# Patient Record
Sex: FEMALE | Race: WHITE | NOT HISPANIC OR LATINO
[De-identification: names, ages, dates, MRNs, and addresses within clinical notes are randomized per-mention and may not be internally consistent; named-entity substitution may affect disease eponyms.]

---

## 2017-11-10 ENCOUNTER — APPOINTMENT (OUTPATIENT)
Dept: DERMATOLOGY | Facility: CLINIC | Age: 82
End: 2017-11-10
Payer: MEDICARE

## 2017-11-10 PROCEDURE — 99214 OFFICE O/P EST MOD 30 MIN: CPT | Mod: 25

## 2017-11-10 PROCEDURE — 17000 DESTRUCT PREMALG LESION: CPT

## 2017-12-06 ENCOUNTER — APPOINTMENT (OUTPATIENT)
Dept: DERMATOLOGY | Facility: CLINIC | Age: 82
End: 2017-12-06
Payer: MEDICARE

## 2017-12-06 PROCEDURE — 99213 OFFICE O/P EST LOW 20 MIN: CPT

## 2018-01-24 ENCOUNTER — APPOINTMENT (OUTPATIENT)
Dept: DERMATOLOGY | Facility: CLINIC | Age: 83
End: 2018-01-24
Payer: MEDICARE

## 2018-01-24 PROCEDURE — 99213 OFFICE O/P EST LOW 20 MIN: CPT | Mod: 25

## 2018-01-24 PROCEDURE — 17000 DESTRUCT PREMALG LESION: CPT

## 2018-04-05 ENCOUNTER — APPOINTMENT (OUTPATIENT)
Dept: DERMATOLOGY | Facility: CLINIC | Age: 83
End: 2018-04-05

## 2019-02-12 ENCOUNTER — APPOINTMENT (OUTPATIENT)
Dept: PULMONOLOGY | Facility: CLINIC | Age: 84
End: 2019-02-12
Payer: MEDICARE

## 2019-02-12 VITALS
HEART RATE: 67 BPM | BODY MASS INDEX: 23.78 KG/M2 | DIASTOLIC BLOOD PRESSURE: 70 MMHG | SYSTOLIC BLOOD PRESSURE: 100 MMHG | OXYGEN SATURATION: 98 % | HEIGHT: 66 IN | WEIGHT: 148 LBS

## 2019-02-12 DIAGNOSIS — Z87.39 PERSONAL HISTORY OF OTHER DISEASES OF THE MUSCULOSKELETAL SYSTEM AND CONNECTIVE TISSUE: ICD-10-CM

## 2019-02-12 DIAGNOSIS — Z00.00 ENCOUNTER FOR GENERAL ADULT MEDICAL EXAMINATION W/OUT ABNORMAL FINDINGS: ICD-10-CM

## 2019-02-12 DIAGNOSIS — Z86.79 PERSONAL HISTORY OF OTHER DISEASES OF THE CIRCULATORY SYSTEM: ICD-10-CM

## 2019-02-12 PROCEDURE — 99204 OFFICE O/P NEW MOD 45 MIN: CPT

## 2019-02-12 RX ORDER — ATENOLOL 50 MG/1
50 TABLET ORAL
Refills: 0 | Status: ACTIVE | COMMUNITY
Start: 2019-02-12

## 2019-02-12 RX ORDER — SPIRONOLACTONE 25 MG/1
25 TABLET ORAL
Refills: 0 | Status: ACTIVE | COMMUNITY

## 2019-02-12 RX ORDER — CLONAZEPAM 0.5 MG/1
0.5 TABLET ORAL
Refills: 0 | Status: DISCONTINUED | COMMUNITY
End: 2019-02-12

## 2019-02-12 RX ORDER — FUROSEMIDE 40 MG/1
40 TABLET ORAL
Refills: 0 | Status: ACTIVE | COMMUNITY

## 2019-02-12 RX ORDER — FUROSEMIDE 20 MG/1
20 TABLET ORAL
Refills: 0 | Status: DISCONTINUED | COMMUNITY
End: 2019-02-12

## 2019-02-12 RX ORDER — ATENOLOL 25 MG/1
25 TABLET ORAL
Refills: 0 | Status: DISCONTINUED | COMMUNITY
End: 2019-02-12

## 2019-02-12 NOTE — PROCEDURE
[FreeTextEntry1] : Cardiology records reviewed\par CT scan 12/18 reviewed\par small R pl eff\par minimal atelectasis scarring bases\par no infiltrates, adenopathy or nodules

## 2019-02-12 NOTE — REASON FOR VISIT
[Initial Evaluation] : an initial evaluation [Abnormal CT Scan] : abnormal CT Scan [FreeTextEntry2] : pleural effusion, ILD, gill

## 2019-02-12 NOTE — PHYSICAL EXAM
[General Appearance - Well Developed] : well developed [Normal Appearance] : normal appearance [General Appearance - Well Nourished] : well nourished [Normal Oropharynx] : normal oropharynx [II] : II [Neck Appearance] : the appearance of the neck was normal [Heart Rate And Rhythm] : heart rate and rhythm were normal [Heart Sounds] : normal S1 and S2 [Murmurs] : no murmurs present [Edema] : no peripheral edema present [Respiration, Rhythm And Depth] : normal respiratory rhythm and effort [Exaggerated Use Of Accessory Muscles For Inspiration] : no accessory muscle use [Auscultation Breath Sounds / Voice Sounds] : lungs were clear to auscultation bilaterally [FreeTextEntry1] : walks with cane [Nail Clubbing] : no clubbing of the fingernails [Cyanosis, Localized] : no localized cyanosis [] : no rash [No Focal Deficits] : no focal deficits [Oriented To Time, Place, And Person] : oriented to person, place, and time [Impaired Insight] : insight and judgment were intact [Affect] : the affect was normal [Memory Recent] : recent memory was not impaired

## 2019-02-12 NOTE — HISTORY OF PRESENT ILLNESS
[FreeTextEntry1] : 83-year-old female with history of hypertension\par Psoriatic and rheumatoid arthritis previously on methotrexate\par Recent echocardiogram and CAT scan with evidence of pulmonary hypertension and small pleural effusion\par She has never smoked\par No prior history of COPD or asthma\par Has chronic exertional dyspnea with a significant component being her inability to walk with any stability\par No productive cough or sputum\par No fever chills or chest\par \par \par \par \par \par \par \par \par \par \par

## 2019-02-12 NOTE — DISCUSSION/SUMMARY
[FreeTextEntry1] : No significant ILD on CT scan, not classic LASHAE body habitus\par small effusion and mild pulmonary hypertension on last echocardiogram 2017\par repeat echocardiogram pending \par pt could not stay for PFTS secondary to inclement weather\par will repeat CXR for effusion and obtain duplex lower extremity, v/q scan\par would need R heart cath if any progression of pulmonary hypertension or worsening symptoms\par to distinguish from WHO diagnostic class II which would be most common\par will re evaluate in 6 weeks or sooner if needed with full PFTS

## 2019-02-12 NOTE — CONSULT LETTER
[Dear  ___] : Dear  [unfilled], [Consult Letter:] : I had the pleasure of evaluating your patient, [unfilled]. [Please see my note below.] : Please see my note below. [Sincerely,] : Sincerely, [FreeTextEntry3] : [facility] DO [facility]\par Pulmonary Critical Care\par Director Pulmonary Division\par Medical Director Respiratory Therapy\par [facility]\par \par

## 2019-03-13 ENCOUNTER — MESSAGE (OUTPATIENT)
Age: 84
End: 2019-03-13

## 2019-04-01 ENCOUNTER — APPOINTMENT (OUTPATIENT)
Dept: PULMONOLOGY | Facility: CLINIC | Age: 84
End: 2019-04-01
Payer: MEDICARE

## 2019-04-01 VITALS
BODY MASS INDEX: 23.73 KG/M2 | DIASTOLIC BLOOD PRESSURE: 82 MMHG | SYSTOLIC BLOOD PRESSURE: 132 MMHG | WEIGHT: 147 LBS | HEART RATE: 70 BPM | OXYGEN SATURATION: 98 %

## 2019-04-01 DIAGNOSIS — Z86.69 PERSONAL HISTORY OF OTHER DISEASES OF THE NERVOUS SYSTEM AND SENSE ORGANS: ICD-10-CM

## 2019-04-01 PROCEDURE — 94010 BREATHING CAPACITY TEST: CPT

## 2019-04-01 PROCEDURE — 99215 OFFICE O/P EST HI 40 MIN: CPT | Mod: 25

## 2019-04-01 RX ORDER — INSULIN DETEMIR 100 [IU]/ML
100 INJECTION, SOLUTION SUBCUTANEOUS
Refills: 0 | Status: ACTIVE | COMMUNITY

## 2019-04-01 NOTE — PHYSICAL EXAM
[General Appearance - Well Developed] : well developed [Normal Appearance] : normal appearance [General Appearance - Well Nourished] : well nourished [Normal Oropharynx] : normal oropharynx [II] : II [Neck Appearance] : the appearance of the neck was normal [Heart Rate And Rhythm] : heart rate and rhythm were normal [Heart Sounds] : normal S1 and S2 [Murmurs] : no murmurs present [Edema] : no peripheral edema present [Respiration, Rhythm And Depth] : normal respiratory rhythm and effort [Exaggerated Use Of Accessory Muscles For Inspiration] : no accessory muscle use [Nail Clubbing] : no clubbing of the fingernails [Cyanosis, Localized] : no localized cyanosis [No Focal Deficits] : no focal deficits [Oriented To Time, Place, And Person] : oriented to person, place, and time [Impaired Insight] : insight and judgment were intact [Affect] : the affect was normal [Memory Recent] : recent memory was not impaired [] : no rash [FreeTextEntry1] : walks with cane

## 2019-04-01 NOTE — HISTORY OF PRESENT ILLNESS
[FreeTextEntry1] : 83-year-old female with history of hypertension\par Psoriatic and rheumatoid arthritis previously on methotrexate\par Recent echocardiogram and CAT scan with evidence of pulmonary hypertension and small pleural effusion\par She has never smoked\par No prior history of COPD or asthma\par Has chronic exertional dyspnea with a significant component being her inability to walk with any stability\par No productive cough or sputum\par No fever chills or chest pain\par no sig change\par \par \par \par \par \par \par \par \par \par \par

## 2019-04-01 NOTE — DISCUSSION/SUMMARY
[FreeTextEntry1] : No significant ILD on CT scan, not classic LASHAE body habitus, CXR small R eff, duplex negative\par mild pulmonary hypertension on last echocardiogram 2017\par repeat echocardiogram 2/19 with biatrial enlargement, normal LV, no sig pulmonary Htn\par spirometry is normal\par will repeat CXR for effusion and obtain  v/q scan, on diuretics\par would need R heart cath if any progression of pulmonary hypertension or worsening symptoms\par discussed home physical therapy for gait abnormalities\par 3-4 months or sooner if needed

## 2019-04-01 NOTE — CONSULT LETTER
[Dear  ___] : Dear  [unfilled], [Consult Letter:] : I had the pleasure of evaluating your patient, [unfilled]. [Please see my note below.] : Please see my note below. [Sincerely,] : Sincerely, [FreeTextEntry3] : [facility] DO [facility]\par Pulmonary Critical Care\par Director Pulmonary Division\par Medical Director Respiratory Therapy\par [facility]\par \par

## 2019-04-01 NOTE — PROCEDURE
[FreeTextEntry1] : Cardiology records reviewed\par CT scan 12/18 reviewed\par small R pl eff\par minimal atelectasis scarring bases\par no infiltrates, adenopathy or nodules\par \par CXR 3/19: neg duplex, small r eff\par spirometry is normal

## 2019-06-06 ENCOUNTER — APPOINTMENT (OUTPATIENT)
Dept: DERMATOLOGY | Facility: CLINIC | Age: 84
End: 2019-06-06
Payer: MEDICARE

## 2019-06-06 PROCEDURE — 99213 OFFICE O/P EST LOW 20 MIN: CPT

## 2019-06-07 ENCOUNTER — APPOINTMENT (OUTPATIENT)
Dept: DERMATOLOGY | Facility: CLINIC | Age: 84
End: 2019-06-07

## 2019-07-01 ENCOUNTER — APPOINTMENT (OUTPATIENT)
Dept: PULMONOLOGY | Facility: CLINIC | Age: 84
End: 2019-07-01

## 2019-08-06 ENCOUNTER — APPOINTMENT (OUTPATIENT)
Dept: DERMATOLOGY | Facility: CLINIC | Age: 84
End: 2019-08-06

## 2019-10-02 ENCOUNTER — OTHER (OUTPATIENT)
Age: 84
End: 2019-10-02

## 2020-01-10 ENCOUNTER — TRANSCRIPTION ENCOUNTER (OUTPATIENT)
Age: 85
End: 2020-01-10

## 2020-03-27 ENCOUNTER — APPOINTMENT (OUTPATIENT)
Dept: DERMATOLOGY | Facility: CLINIC | Age: 85
End: 2020-03-27

## 2020-03-30 ENCOUNTER — APPOINTMENT (OUTPATIENT)
Dept: DERMATOLOGY | Facility: CLINIC | Age: 85
End: 2020-03-30

## 2020-05-06 ENCOUNTER — APPOINTMENT (OUTPATIENT)
Dept: THORACIC SURGERY | Facility: CLINIC | Age: 85
End: 2020-05-06
Payer: MEDICARE

## 2020-05-06 DIAGNOSIS — Z82.49 FAMILY HISTORY OF ISCHEMIC HEART DISEASE AND OTHER DISEASES OF THE CIRCULATORY SYSTEM: ICD-10-CM

## 2020-05-06 DIAGNOSIS — Z80.1 FAMILY HISTORY OF MALIGNANT NEOPLASM OF TRACHEA, BRONCHUS AND LUNG: ICD-10-CM

## 2020-05-06 PROCEDURE — 99204 OFFICE O/P NEW MOD 45 MIN: CPT | Mod: 95

## 2020-05-06 RX ORDER — ALBUTEROL SULFATE 2.5 MG/3ML
(2.5 MG/3ML) SOLUTION RESPIRATORY (INHALATION)
Refills: 0 | Status: ACTIVE | COMMUNITY

## 2020-05-06 RX ORDER — GLIPIZIDE 10 MG/1
10 TABLET ORAL
Refills: 0 | Status: COMPLETED | COMMUNITY
End: 2020-05-06

## 2020-05-06 RX ORDER — CLONAZEPAM 0.25 MG
0.25 TABLET,DISINTEGRATING ORAL
Refills: 0 | Status: COMPLETED | COMMUNITY
End: 2020-05-06

## 2020-05-06 RX ORDER — CLONIDINE HYDROCHLORIDE 0.2 MG/1
0.2 TABLET ORAL
Refills: 0 | Status: ACTIVE | COMMUNITY

## 2020-05-06 RX ORDER — METFORMIN HYDROCHLORIDE 500 MG/1
500 TABLET, COATED ORAL
Refills: 0 | Status: COMPLETED | COMMUNITY
End: 2020-05-06

## 2020-05-06 RX ORDER — AMLODIPINE BESYLATE 10 MG/1
10 TABLET ORAL
Refills: 0 | Status: ACTIVE | COMMUNITY

## 2020-05-06 NOTE — HISTORY OF PRESENT ILLNESS
[Home] : at home, [unfilled] , at the time of the visit. [Medical Office: ([facility])___] : at the medical office located in  [Family Member] : family member [Patient] : the patient [Self] : self [FreeTextEntry4] : Joseph Alford, NP [FreeTextEntry1] : \par I spoke with Lucille by phone today for a telehealth visit. She has a history of rheumatoid arthritis and cirrhosis with a recent finding of a right pleural effusion and ascites. She did have progressive shortness of breath but has now improved as her diuretics have been adjusted. A recent CT scan demonstrated a moderate to large right pleural effusion with ascites and a cirrhotic-appearing liver. She does function well on a daily basis at this point. She denies fever, chills, night sweats, weight loss or weight gain.

## 2020-05-06 NOTE — ASSESSMENT
[FreeTextEntry1] : Lucille is an 84-year-old female with a right pleural effusion and ascites which are likely related to cirrhosis. She does state she is feeling better at this point with increased diuretics and would like to hold on thoracentesis for now. I have asked her to contact my office if her breathing worsens so an x-ray and drainage can be arranged.\par \par Thank you for allowing me to participate in the care of your patient.\par \par Agus Best MD\[facility] Department of Cardiovascular and Thoracic Surgery\par \paz Menon and Tiffanie Benson\[facility] [employer] [facility]\par

## 2020-08-10 ENCOUNTER — APPOINTMENT (OUTPATIENT)
Dept: PULMONOLOGY | Facility: CLINIC | Age: 85
End: 2020-08-10
Payer: MEDICARE

## 2020-08-10 VITALS
HEIGHT: 65 IN | OXYGEN SATURATION: 95 % | HEART RATE: 65 BPM | WEIGHT: 140 LBS | DIASTOLIC BLOOD PRESSURE: 78 MMHG | BODY MASS INDEX: 23.32 KG/M2 | SYSTOLIC BLOOD PRESSURE: 126 MMHG

## 2020-08-10 DIAGNOSIS — J90 PLEURAL EFFUSION, NOT ELSEWHERE CLASSIFIED: ICD-10-CM

## 2020-08-10 DIAGNOSIS — I27.20 PULMONARY HYPERTENSION, UNSPECIFIED: ICD-10-CM

## 2020-08-10 DIAGNOSIS — R06.00 DYSPNEA, UNSPECIFIED: ICD-10-CM

## 2020-08-10 PROCEDURE — 99215 OFFICE O/P EST HI 40 MIN: CPT

## 2020-08-10 RX ORDER — ALBUTEROL SULFATE 0.63 MG/3ML
0.63 SOLUTION RESPIRATORY (INHALATION)
Qty: 75 | Refills: 0 | Status: DISCONTINUED | COMMUNITY
Start: 2020-05-20

## 2020-08-10 RX ORDER — BLOOD SUGAR DIAGNOSTIC
STRIP MISCELLANEOUS
Qty: 300 | Refills: 0 | Status: DISCONTINUED | COMMUNITY
Start: 2020-03-18

## 2020-08-10 RX ORDER — BENZONATATE 100 MG/1
100 CAPSULE ORAL
Qty: 90 | Refills: 0 | Status: ACTIVE | COMMUNITY
Start: 2020-07-07

## 2020-08-10 NOTE — REASON FOR VISIT
[Abnormal CT Scan] : abnormal CT Scan [Initial Evaluation] : an initial evaluation [FreeTextEntry2] : pleural effusion, ILD, gill

## 2020-08-10 NOTE — PROCEDURE
[FreeTextEntry1] : Cardiology records reviewed\par CT scan 12/18 reviewed\par small R pl eff\par minimal atelectasis scarring bases\par no infiltrates, adenopathy or nodules\par \par CXR 8/20: large R eff \par spirometry is normal

## 2020-08-10 NOTE — HISTORY OF PRESENT ILLNESS
[FreeTextEntry1] : 83-year-old female with history of hypertension\par Psoriatic and rheumatoid arthritis previously on methotrexate\par  never smoked\par No prior history of COPD or asthma\par Has chronic exertional dyspnea , feels better\par No productive cough or sputum\par No fever chills or chest pain\par no sig change\par \par \par \par \par \par \par \par \par \par \par

## 2020-08-10 NOTE — CONSULT LETTER
[Consult Letter:] : I had the pleasure of evaluating your patient, [unfilled]. [Please see my note below.] : Please see my note below. [Sincerely,] : Sincerely, [Dear  ___] : Dear  [unfilled], [FreeTextEntry3] : Peter Cotto DO [facility]\par Pulmonary Critical Care\par Director Pulmonary Division\par Medical Director Respiratory Therapy\par [facility]\par \par

## 2020-08-10 NOTE — PHYSICAL EXAM
[Normal Appearance] : normal appearance [General Appearance - Well Developed] : well developed [Normal Oropharynx] : normal oropharynx [General Appearance - Well Nourished] : well nourished [II] : II [Heart Rate And Rhythm] : heart rate and rhythm were normal [Neck Appearance] : the appearance of the neck was normal [Murmurs] : no murmurs present [Edema] : no peripheral edema present [Heart Sounds] : normal S1 and S2 [Exaggerated Use Of Accessory Muscles For Inspiration] : no accessory muscle use [Respiration, Rhythm And Depth] : normal respiratory rhythm and effort [Nail Clubbing] : no clubbing of the fingernails [Cyanosis, Localized] : no localized cyanosis [No Focal Deficits] : no focal deficits [Oriented To Time, Place, And Person] : oriented to person, place, and time [Impaired Insight] : insight and judgment were intact [Affect] : the affect was normal [Memory Recent] : recent memory was not impaired [] : no rash [FreeTextEntry1] : walks with cane

## 2020-08-10 NOTE — DISCUSSION/SUMMARY
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

## 2020-08-24 ENCOUNTER — APPOINTMENT (OUTPATIENT)
Dept: GASTROENTEROLOGY | Facility: CLINIC | Age: 85
End: 2020-08-24
Payer: MEDICARE

## 2020-08-24 VITALS
BODY MASS INDEX: 21.97 KG/M2 | SYSTOLIC BLOOD PRESSURE: 160 MMHG | WEIGHT: 140 LBS | OXYGEN SATURATION: 96 % | HEART RATE: 65 BPM | HEIGHT: 67 IN | DIASTOLIC BLOOD PRESSURE: 90 MMHG

## 2020-08-24 DIAGNOSIS — R19.00 INTRA-ABDOMINAL AND PELVIC SWELLING, MASS AND LUMP, UNSPECIFIED SITE: ICD-10-CM

## 2020-08-24 DIAGNOSIS — K74.69 OTHER CIRRHOSIS OF LIVER: ICD-10-CM

## 2020-08-24 PROCEDURE — 99203 OFFICE O/P NEW LOW 30 MIN: CPT

## 2020-08-24 RX ORDER — PREDNISONE 2.5 MG/1
2.5 TABLET ORAL
Qty: 90 | Refills: 0 | Status: ACTIVE | COMMUNITY
Start: 2020-04-17

## 2020-08-24 RX ORDER — DOXYCYCLINE HYCLATE 100 MG/1
100 TABLET ORAL
Qty: 14 | Refills: 0 | Status: ACTIVE | COMMUNITY
Start: 2020-04-25

## 2020-08-24 RX ORDER — AZITHROMYCIN 250 MG/1
250 TABLET, FILM COATED ORAL
Qty: 6 | Refills: 0 | Status: ACTIVE | COMMUNITY
Start: 2020-04-22

## 2020-08-24 RX ORDER — PEN NEEDLE, DIABETIC 29 G X1/2"
32G X 4 MM NEEDLE, DISPOSABLE MISCELLANEOUS
Qty: 400 | Refills: 0 | Status: ACTIVE | COMMUNITY
Start: 2020-04-15

## 2020-08-24 RX ORDER — INSULIN DETEMIR 100 [IU]/ML
100 INJECTION, SOLUTION SUBCUTANEOUS
Qty: 15 | Refills: 0 | Status: ACTIVE | COMMUNITY
Start: 2020-04-15

## 2020-08-24 NOTE — CONSULT LETTER
[Dear  ___] : Dear  [unfilled], [Please see my note below.] : Please see my note below. [Consult Letter:] : I had the pleasure of evaluating your patient, [unfilled]. [Consult Closing:] : Thank you very much for allowing me to participate in the care of this patient.  If you have any questions, please do not hesitate to contact me. [DrSantos  ___] : Dr. KERR [FreeTextEntry3] : Very truly yours,\par \par KIRILL Roa MD\par [facility]\par Gastroenterology at [place]\par [address], [address]\[place] [address]\par Tel [phone]\par Fax [phone]

## 2020-08-24 NOTE — PHYSICAL EXAM
[General Appearance - Alert] : alert [General Appearance - In No Acute Distress] : in no acute distress [PERRL With Normal Accommodation] : pupils were equal in size, round, and reactive to light [Sclera] : the sclera and conjunctiva were normal [Extraocular Movements] : extraocular movements were intact [Outer Ear] : the ears and nose were normal in appearance [Neck Appearance] : the appearance of the neck was normal [Hearing Threshold Finger Rub Not Villalba] : hearing was normal [Neck Cervical Mass (___cm)] : no neck mass was observed [Jugular Venous Distention Increased] : there was no jugular-venous distention [Thyroid Diffuse Enlargement] : the thyroid was not enlarged [] : no respiratory distress [Thyroid Nodule] : there were no palpable thyroid nodules [Heart Rate And Rhythm] : heart rate was normal and rhythm regular [Auscultation Breath Sounds / Voice Sounds] : lungs were clear to auscultation bilaterally [Murmurs] : no murmurs [Heart Sounds] : normal S1 and S2 [Heart Sounds Gallop] : no gallops [Heart Sounds Pericardial Friction Rub] : no pericardial rub [___+] : [unfilled]+ pretibial pitting edema on the left [Normal] : normal [Soft, Nontender] : the abdomen was soft and nontender [Tympanitic] : tympanitic to percussion [Epigastric] : in the epigastric area [No Mass] : no masses were palpated [Liver Enlarged] : enlarged [___cm] : with a span of [unfilled] cm [Cervical Lymph Nodes Enlarged Posterior Bilaterally] : posterior cervical [Cervical Lymph Nodes Enlarged Anterior Bilaterally] : anterior cervical [Nail Clubbing] : no clubbing  or cyanosis of the fingernails [Skin Color & Pigmentation] : normal skin color and pigmentation [Skin Turgor] : normal skin turgor [No Focal Deficits] : no focal deficits [Oriented To Time, Place, And Person] : oriented to person, place, and time [Impaired Insight] : insight and judgment were intact [Affect] : the affect was normal [Dilated Collat. Veins] : no collateral vein dilation [Liver Tender To Palpation] : not tender [Spleen Tender] : not tender [Spleen Enlarged] : not enlarged [FreeTextEntry1] : No cirrhosis stimaga

## 2020-08-24 NOTE — ASSESSMENT
[FreeTextEntry1] : Will send liver serologies and ultrasound with [facility].  Will also touch base with Dr. VALERI Krishnamurthy.  Patient is not OLT candidate due to age.  Liver nodular and enlarged on exam.  Suspect either cardiac cirrhosis or GAMING cirrhosis or a combination thereof.

## 2020-08-24 NOTE — HISTORY OF PRESENT ILLNESS
[de-identified] : This is a lesly 85-year-old woman with a past medical history of CHF, pulmonary hypertension, DM, ILD and ?cirrhosis who was referred for the latter.  Patient does nit drink alcohol and has no history of illicit drug use.  Per chart review, it does not appears she has had any abdominal imaging.  She reports some periodic abdominal swelling.  It was recommended to her that she undergo a paracentesis bur feared it would lead to a viscous cycle of needing repeated taps.  She is currently on furosemide and spironolactone.

## 2020-08-24 NOTE — REVIEW OF SYSTEMS
[As Noted in HPI] : as noted in HPI [Negative] : Heme/Lymph [Abdominal Pain] : no abdominal pain [Vomiting] : no vomiting [Constipation] : no constipation [Diarrhea] : no diarrhea

## 2020-09-02 ENCOUNTER — RX CHANGE (OUTPATIENT)
Age: 85
End: 2020-09-02

## 2020-09-12 ENCOUNTER — OUTPATIENT (OUTPATIENT)
Dept: OUTPATIENT SERVICES | Facility: HOSPITAL | Age: 85
LOS: 1 days | End: 2020-09-12
Payer: MEDICARE

## 2020-09-12 ENCOUNTER — APPOINTMENT (OUTPATIENT)
Dept: ULTRASOUND IMAGING | Facility: CLINIC | Age: 85
End: 2020-09-12
Payer: MEDICARE

## 2020-09-12 DIAGNOSIS — K74.69 OTHER CIRRHOSIS OF LIVER: ICD-10-CM

## 2020-09-12 DIAGNOSIS — Z00.00 ENCOUNTER FOR GENERAL ADULT MEDICAL EXAMINATION WITHOUT ABNORMAL FINDINGS: ICD-10-CM

## 2020-09-12 PROCEDURE — 93975 VASCULAR STUDY: CPT

## 2020-09-12 PROCEDURE — 93975 VASCULAR STUDY: CPT | Mod: 26

## 2020-09-13 ENCOUNTER — TRANSCRIPTION ENCOUNTER (OUTPATIENT)
Age: 85
End: 2020-09-13

## 2020-10-09 ENCOUNTER — RESULT REVIEW (OUTPATIENT)
Age: 85
End: 2020-10-09

## 2020-10-09 ENCOUNTER — INPATIENT (INPATIENT)
Facility: HOSPITAL | Age: 85
LOS: 10 days | Discharge: HOME | DRG: 432 | End: 2020-10-20
Attending: HOSPITALIST | Admitting: HOSPITALIST
Payer: MEDICARE

## 2020-10-09 VITALS
OXYGEN SATURATION: 95 % | SYSTOLIC BLOOD PRESSURE: 188 MMHG | DIASTOLIC BLOOD PRESSURE: 108 MMHG | HEART RATE: 74 BPM | TEMPERATURE: 98 F | RESPIRATION RATE: 22 BRPM

## 2020-10-09 DIAGNOSIS — J90 PLEURAL EFFUSION, NOT ELSEWHERE CLASSIFIED: ICD-10-CM

## 2020-10-09 DIAGNOSIS — E11.9 TYPE 2 DIABETES MELLITUS WITHOUT COMPLICATIONS: ICD-10-CM

## 2020-10-09 DIAGNOSIS — I50.9 HEART FAILURE, UNSPECIFIED: ICD-10-CM

## 2020-10-09 DIAGNOSIS — Z98.890 OTHER SPECIFIED POSTPROCEDURAL STATES: Chronic | ICD-10-CM

## 2020-10-09 DIAGNOSIS — I10 ESSENTIAL (PRIMARY) HYPERTENSION: ICD-10-CM

## 2020-10-09 DIAGNOSIS — I48.91 UNSPECIFIED ATRIAL FIBRILLATION: ICD-10-CM

## 2020-10-09 LAB
ALBUMIN FLD-MCNC: 0.6 G/DL — SIGNIFICANT CHANGE UP
ALBUMIN SERPL ELPH-MCNC: 2.8 G/DL — LOW (ref 3.3–5.2)
ALP SERPL-CCNC: 163 U/L — HIGH (ref 40–120)
ALT FLD-CCNC: 19 U/L — SIGNIFICANT CHANGE UP
ANION GAP SERPL CALC-SCNC: 11 MMOL/L — SIGNIFICANT CHANGE UP (ref 5–17)
ANION GAP SERPL CALC-SCNC: 13 MMOL/L — SIGNIFICANT CHANGE UP (ref 5–17)
APPEARANCE UR: CLEAR — SIGNIFICANT CHANGE UP
APTT BLD: 29.7 SEC — SIGNIFICANT CHANGE UP (ref 27.5–35.5)
AST SERPL-CCNC: 35 U/L — HIGH
B PERT IGG+IGM PNL SER: CLEAR — SIGNIFICANT CHANGE UP
BACTERIA # UR AUTO: ABNORMAL
BASE EXCESS BLDV CALC-SCNC: -0.2 MMOL/L — SIGNIFICANT CHANGE UP (ref -2–2)
BASOPHILS # BLD AUTO: 0.06 K/UL — SIGNIFICANT CHANGE UP (ref 0–0.2)
BASOPHILS NFR BLD AUTO: 0.7 % — SIGNIFICANT CHANGE UP (ref 0–2)
BILIRUB SERPL-MCNC: 0.6 MG/DL — SIGNIFICANT CHANGE UP (ref 0.4–2)
BILIRUB UR-MCNC: NEGATIVE — SIGNIFICANT CHANGE UP
BUN SERPL-MCNC: 57 MG/DL — HIGH (ref 8–20)
BUN SERPL-MCNC: 59 MG/DL — HIGH (ref 8–20)
CA-I SERPL-SCNC: 1.17 MMOL/L — SIGNIFICANT CHANGE UP (ref 1.15–1.33)
CALCIUM SERPL-MCNC: 8.7 MG/DL — SIGNIFICANT CHANGE UP (ref 8.6–10.2)
CALCIUM SERPL-MCNC: 8.8 MG/DL — SIGNIFICANT CHANGE UP (ref 8.6–10.2)
CHLORIDE BLDV-SCNC: 104 MMOL/L — SIGNIFICANT CHANGE UP (ref 98–107)
CHLORIDE SERPL-SCNC: 100 MMOL/L — SIGNIFICANT CHANGE UP (ref 98–107)
CHLORIDE SERPL-SCNC: 101 MMOL/L — SIGNIFICANT CHANGE UP (ref 98–107)
CO2 SERPL-SCNC: 23 MMOL/L — SIGNIFICANT CHANGE UP (ref 22–29)
CO2 SERPL-SCNC: 24 MMOL/L — SIGNIFICANT CHANGE UP (ref 22–29)
COLOR FLD: YELLOW
COLOR SPEC: YELLOW — SIGNIFICANT CHANGE UP
CREAT SERPL-MCNC: 1.81 MG/DL — HIGH (ref 0.5–1.3)
CREAT SERPL-MCNC: 1.83 MG/DL — HIGH (ref 0.5–1.3)
DIFF PNL FLD: ABNORMAL
EOSINOPHIL # BLD AUTO: 0.18 K/UL — SIGNIFICANT CHANGE UP (ref 0–0.5)
EOSINOPHIL NFR BLD AUTO: 2.2 % — SIGNIFICANT CHANGE UP (ref 0–6)
EPI CELLS # UR: SIGNIFICANT CHANGE UP
FLUID INTAKE SUBSTANCE CLASS: SIGNIFICANT CHANGE UP
FLUID SEGMENTED GRANULOCYTES: 2 % — SIGNIFICANT CHANGE UP
GAS PNL BLDV: 139 MMOL/L — SIGNIFICANT CHANGE UP (ref 135–145)
GAS PNL BLDV: SIGNIFICANT CHANGE UP
GAS PNL BLDV: SIGNIFICANT CHANGE UP
GLUCOSE BLDC GLUCOMTR-MCNC: 203 MG/DL — HIGH (ref 70–99)
GLUCOSE BLDC GLUCOMTR-MCNC: 221 MG/DL — HIGH (ref 70–99)
GLUCOSE BLDC GLUCOMTR-MCNC: 282 MG/DL — HIGH (ref 70–99)
GLUCOSE BLDV-MCNC: 311 MG/DL — HIGH (ref 70–99)
GLUCOSE FLD-MCNC: 347 MG/DL — SIGNIFICANT CHANGE UP
GLUCOSE SERPL-MCNC: 293 MG/DL — HIGH (ref 70–99)
GLUCOSE SERPL-MCNC: 332 MG/DL — HIGH (ref 70–99)
GLUCOSE UR QL: 100 MG/DL
GRAM STN FLD: SIGNIFICANT CHANGE UP
HCO3 BLDV-SCNC: 24 MMOL/L — SIGNIFICANT CHANGE UP (ref 20–26)
HCT VFR BLD CALC: 42 % — SIGNIFICANT CHANGE UP (ref 34.5–45)
HCT VFR BLDA CALC: 42 — SIGNIFICANT CHANGE UP (ref 39–50)
HGB BLD CALC-MCNC: 13.8 G/DL — SIGNIFICANT CHANGE UP (ref 11.5–15.5)
HGB BLD-MCNC: 13.6 G/DL — SIGNIFICANT CHANGE UP (ref 11.5–15.5)
IMM GRANULOCYTES NFR BLD AUTO: 0.2 % — SIGNIFICANT CHANGE UP (ref 0–1.5)
INR BLD: 1.03 RATIO — SIGNIFICANT CHANGE UP (ref 0.88–1.16)
KETONES UR-MCNC: NEGATIVE — SIGNIFICANT CHANGE UP
LACTATE BLDV-MCNC: 2.2 MMOL/L — HIGH (ref 0.5–2)
LDH SERPL L TO P-CCNC: 235 U/L — HIGH (ref 98–192)
LDH SERPL L TO P-CCNC: 54 U/L — SIGNIFICANT CHANGE UP
LEUKOCYTE ESTERASE UR-ACNC: NEGATIVE — SIGNIFICANT CHANGE UP
LIDOCAIN IGE QN: 44 U/L — SIGNIFICANT CHANGE UP (ref 22–51)
LYMPHOCYTES # BLD AUTO: 1.05 K/UL — SIGNIFICANT CHANGE UP (ref 1–3.3)
LYMPHOCYTES # BLD AUTO: 13.1 % — SIGNIFICANT CHANGE UP (ref 13–44)
LYMPHOCYTES # FLD: 31 % — SIGNIFICANT CHANGE UP
MAGNESIUM SERPL-MCNC: 2 MG/DL — SIGNIFICANT CHANGE UP (ref 1.6–2.6)
MCHC RBC-ENTMCNC: 31.2 PG — SIGNIFICANT CHANGE UP (ref 27–34)
MCHC RBC-ENTMCNC: 32.4 GM/DL — SIGNIFICANT CHANGE UP (ref 32–36)
MCV RBC AUTO: 96.3 FL — SIGNIFICANT CHANGE UP (ref 80–100)
MESOTHL CELL # FLD: 17 % — SIGNIFICANT CHANGE UP
MONOCYTES # BLD AUTO: 0.98 K/UL — HIGH (ref 0–0.9)
MONOCYTES NFR BLD AUTO: 12.2 % — SIGNIFICANT CHANGE UP (ref 2–14)
MONOS+MACROS # FLD: 50 % — SIGNIFICANT CHANGE UP
NEUTROPHILS # BLD AUTO: 5.75 K/UL — SIGNIFICANT CHANGE UP (ref 1.8–7.4)
NEUTROPHILS NFR BLD AUTO: 71.6 % — SIGNIFICANT CHANGE UP (ref 43–77)
NITRITE UR-MCNC: NEGATIVE — SIGNIFICANT CHANGE UP
NT-PROBNP SERPL-SCNC: 4301 PG/ML — HIGH (ref 0–300)
OTHER CELLS CSF MANUAL: 12 ML/DL — LOW (ref 18–22)
PCO2 BLDV: 50 MMHG — SIGNIFICANT CHANGE UP (ref 35–50)
PH BLDV: 7.33 — SIGNIFICANT CHANGE UP (ref 7.32–7.43)
PH FLD: 8 — SIGNIFICANT CHANGE UP
PH UR: 6 — SIGNIFICANT CHANGE UP (ref 5–8)
PLATELET # BLD AUTO: 190 K/UL — SIGNIFICANT CHANGE UP (ref 150–400)
PO2 BLDV: 36 MMHG — SIGNIFICANT CHANGE UP (ref 25–45)
POTASSIUM BLDV-SCNC: 4.4 MMOL/L — SIGNIFICANT CHANGE UP (ref 3.4–4.5)
POTASSIUM SERPL-MCNC: 4.5 MMOL/L — SIGNIFICANT CHANGE UP (ref 3.5–5.3)
POTASSIUM SERPL-MCNC: 4.7 MMOL/L — SIGNIFICANT CHANGE UP (ref 3.5–5.3)
POTASSIUM SERPL-SCNC: 4.5 MMOL/L — SIGNIFICANT CHANGE UP (ref 3.5–5.3)
POTASSIUM SERPL-SCNC: 4.7 MMOL/L — SIGNIFICANT CHANGE UP (ref 3.5–5.3)
PROT FLD-MCNC: 1.5 G/DL — SIGNIFICANT CHANGE UP
PROT SERPL-MCNC: 6.8 G/DL — SIGNIFICANT CHANGE UP (ref 6.6–8.7)
PROT UR-MCNC: 100 MG/DL
PROTHROM AB SERPL-ACNC: 11.9 SEC — SIGNIFICANT CHANGE UP (ref 10.6–13.6)
RBC # BLD: 4.36 M/UL — SIGNIFICANT CHANGE UP (ref 3.8–5.2)
RBC # FLD: 14 % — SIGNIFICANT CHANGE UP (ref 10.3–14.5)
RBC CASTS # UR COMP ASSIST: ABNORMAL /HPF (ref 0–4)
RCV VOL RI: 117 /UL — HIGH (ref 0–0)
SAO2 % BLDV: 66 % — SIGNIFICANT CHANGE UP
SARS-COV-2 IGG SERPL QL IA: NEGATIVE — SIGNIFICANT CHANGE UP
SARS-COV-2 IGM SERPL IA-ACNC: <3.8 AU/ML — SIGNIFICANT CHANGE UP
SARS-COV-2 RNA SPEC QL NAA+PROBE: SIGNIFICANT CHANGE UP
SODIUM SERPL-SCNC: 136 MMOL/L — SIGNIFICANT CHANGE UP (ref 135–145)
SODIUM SERPL-SCNC: 136 MMOL/L — SIGNIFICANT CHANGE UP (ref 135–145)
SP GR SPEC: 1.01 — SIGNIFICANT CHANGE UP (ref 1.01–1.02)
SPECIMEN SOURCE: SIGNIFICANT CHANGE UP
TOTAL NUCLEATED CELL COUNT, BODY FLUID: 80 /UL — SIGNIFICANT CHANGE UP
TROPONIN T SERPL-MCNC: 0.03 NG/ML — SIGNIFICANT CHANGE UP (ref 0–0.06)
TUBE TYPE: SIGNIFICANT CHANGE UP
UROBILINOGEN FLD QL: NEGATIVE MG/DL — SIGNIFICANT CHANGE UP
WBC # BLD: 8.04 K/UL — SIGNIFICANT CHANGE UP (ref 3.8–10.5)
WBC # FLD AUTO: 8.04 K/UL — SIGNIFICANT CHANGE UP (ref 3.8–10.5)
WBC UR QL: SIGNIFICANT CHANGE UP

## 2020-10-09 PROCEDURE — 32557 INSERT CATH PLEURA W/ IMAGE: CPT

## 2020-10-09 PROCEDURE — 88305 TISSUE EXAM BY PATHOLOGIST: CPT | Mod: 26

## 2020-10-09 PROCEDURE — 12345: CPT | Mod: NC

## 2020-10-09 PROCEDURE — 88112 CYTOPATH CELL ENHANCE TECH: CPT | Mod: 26

## 2020-10-09 PROCEDURE — 71045 X-RAY EXAM CHEST 1 VIEW: CPT | Mod: 26,77

## 2020-10-09 PROCEDURE — 71045 X-RAY EXAM CHEST 1 VIEW: CPT | Mod: 26

## 2020-10-09 PROCEDURE — 93010 ELECTROCARDIOGRAM REPORT: CPT

## 2020-10-09 PROCEDURE — 99223 1ST HOSP IP/OBS HIGH 75: CPT

## 2020-10-09 PROCEDURE — 99285 EMERGENCY DEPT VISIT HI MDM: CPT

## 2020-10-09 PROCEDURE — 71250 CT THORAX DX C-: CPT | Mod: 26

## 2020-10-09 RX ORDER — LIDOCAINE 4 G/100G
1 CREAM TOPICAL DAILY
Refills: 0 | Status: DISCONTINUED | OUTPATIENT
Start: 2020-10-09 | End: 2020-10-20

## 2020-10-09 RX ORDER — HYDRALAZINE HCL 50 MG
10 TABLET ORAL ONCE
Refills: 0 | Status: COMPLETED | OUTPATIENT
Start: 2020-10-09 | End: 2020-10-09

## 2020-10-09 RX ORDER — DEXTROSE 50 % IN WATER 50 %
25 SYRINGE (ML) INTRAVENOUS ONCE
Refills: 0 | Status: DISCONTINUED | OUTPATIENT
Start: 2020-10-09 | End: 2020-10-20

## 2020-10-09 RX ORDER — INSULIN LISPRO 100/ML
VIAL (ML) SUBCUTANEOUS
Refills: 0 | Status: DISCONTINUED | OUTPATIENT
Start: 2020-10-09 | End: 2020-10-20

## 2020-10-09 RX ORDER — OXYCODONE AND ACETAMINOPHEN 5; 325 MG/1; MG/1
1 TABLET ORAL EVERY 4 HOURS
Refills: 0 | Status: DISCONTINUED | OUTPATIENT
Start: 2020-10-09 | End: 2020-10-10

## 2020-10-09 RX ORDER — FUROSEMIDE 40 MG
80 TABLET ORAL ONCE
Refills: 0 | Status: COMPLETED | OUTPATIENT
Start: 2020-10-09 | End: 2020-10-09

## 2020-10-09 RX ORDER — ATENOLOL 25 MG/1
50 TABLET ORAL
Refills: 0 | Status: DISCONTINUED | OUTPATIENT
Start: 2020-10-09 | End: 2020-10-18

## 2020-10-09 RX ORDER — METOPROLOL TARTRATE 50 MG
5 TABLET ORAL ONCE
Refills: 0 | Status: COMPLETED | OUTPATIENT
Start: 2020-10-09 | End: 2020-10-09

## 2020-10-09 RX ORDER — DEXTROSE 50 % IN WATER 50 %
12.5 SYRINGE (ML) INTRAVENOUS ONCE
Refills: 0 | Status: DISCONTINUED | OUTPATIENT
Start: 2020-10-09 | End: 2020-10-20

## 2020-10-09 RX ORDER — SPIRONOLACTONE 25 MG/1
25 TABLET, FILM COATED ORAL DAILY
Refills: 0 | Status: DISCONTINUED | OUTPATIENT
Start: 2020-10-09 | End: 2020-10-20

## 2020-10-09 RX ORDER — DEXTROSE 50 % IN WATER 50 %
15 SYRINGE (ML) INTRAVENOUS ONCE
Refills: 0 | Status: DISCONTINUED | OUTPATIENT
Start: 2020-10-09 | End: 2020-10-20

## 2020-10-09 RX ORDER — FUROSEMIDE 40 MG
40 TABLET ORAL
Refills: 0 | Status: DISCONTINUED | OUTPATIENT
Start: 2020-10-09 | End: 2020-10-13

## 2020-10-09 RX ORDER — ACETAMINOPHEN 500 MG
650 TABLET ORAL EVERY 6 HOURS
Refills: 0 | Status: DISCONTINUED | OUTPATIENT
Start: 2020-10-09 | End: 2020-10-20

## 2020-10-09 RX ORDER — GLUCAGON INJECTION, SOLUTION 0.5 MG/.1ML
1 INJECTION, SOLUTION SUBCUTANEOUS ONCE
Refills: 0 | Status: DISCONTINUED | OUTPATIENT
Start: 2020-10-09 | End: 2020-10-20

## 2020-10-09 RX ORDER — SODIUM CHLORIDE 9 MG/ML
1000 INJECTION, SOLUTION INTRAVENOUS
Refills: 0 | Status: DISCONTINUED | OUTPATIENT
Start: 2020-10-09 | End: 2020-10-20

## 2020-10-09 RX ADMIN — ATENOLOL 50 MILLIGRAM(S): 25 TABLET ORAL at 17:34

## 2020-10-09 RX ADMIN — Medication 0.2 MILLIGRAM(S): at 17:34

## 2020-10-09 RX ADMIN — LIDOCAINE 1 PATCH: 4 CREAM TOPICAL at 11:19

## 2020-10-09 RX ADMIN — SPIRONOLACTONE 25 MILLIGRAM(S): 25 TABLET, FILM COATED ORAL at 05:43

## 2020-10-09 RX ADMIN — Medication 40 MILLIGRAM(S): at 05:44

## 2020-10-09 RX ADMIN — Medication 2: at 16:18

## 2020-10-09 RX ADMIN — Medication 80 MILLIGRAM(S): at 02:57

## 2020-10-09 RX ADMIN — Medication 2.5 MILLIGRAM(S): at 05:43

## 2020-10-09 RX ADMIN — Medication 3: at 07:53

## 2020-10-09 RX ADMIN — ATENOLOL 50 MILLIGRAM(S): 25 TABLET ORAL at 05:44

## 2020-10-09 RX ADMIN — OXYCODONE AND ACETAMINOPHEN 1 TABLET(S): 5; 325 TABLET ORAL at 10:44

## 2020-10-09 RX ADMIN — Medication 2: at 11:30

## 2020-10-09 RX ADMIN — Medication 10 MILLIGRAM(S): at 04:20

## 2020-10-09 RX ADMIN — Medication 40 MILLIGRAM(S): at 17:33

## 2020-10-09 RX ADMIN — Medication 5 MILLIGRAM(S): at 03:36

## 2020-10-09 RX ADMIN — Medication 0.2 MILLIGRAM(S): at 05:43

## 2020-10-09 NOTE — H&P ADULT - ASSESSMENT
85 year old F with PMH of HTN, CHF, cirrhosis with ascites, HLD, DM, AFib (no A/C) presented to ED s/p fall, reported as mechanical fall, tripped and fell, denies loss of consciousness or head injury. Patient stated she had worsening shortness of breath for the past 3 days.    Acute hypoxic respiratory failure due to right pleural effusion  -CXR showed large right side pleural effusion likely multifactorial in the setting of cirrhosis with ascites and chf   -pt with mild to moderate acute respiratory distress on presentation requiring 4L NC   -s/p Lasix 80mg IV push in the ED  -Evaluated by CT surg for possible pigtail cath awaiting CT chest w/o contrast   -cont with Lasix 40mg IV bid for now   -admit to telemeter, closely monitor respiratory status   -pulm consulted     Decompensated HF  -acute fluid overload on exam, elevated BMP, pleural effusion    -cont with Lasix 40mg IV bid   -cont spironolactone, oxygen supplement as needed    -Card [place] consulted     Cirrhosis with ascites  -US abdomin with moderated ascites with cirrhosis   -cont with Lasix 40mg iv bid   -GI consulted     DEVYN  -likely on ckd, unknown baseline cr   -cr 1.8, BUN 57  -monitor renal function closely while on diuretics   -avoid nephrotoxic meds     Hypertensive urgency   -/115 likely due to missed medications   -s/p hydrazine 10mg IV push BP improved to 151/77  -cont with Lasix 40mg IV push, spironolactone 25mg and clonidine        IDDM  -cont with lantus 12units at bedtime  -ISS, hypoglycemic protocol, FSG     Arthritis   -cont with prednisone     DVT ppx   -scd for now, hold Ac for planned pigtail

## 2020-10-09 NOTE — ED PROVIDER NOTE - PHYSICAL EXAMINATION
Const: Awake, alert and oriented. In no acute distress. Well appearing.  HEENT: NC/AT. Moist mucous membranes.  Eyes: No scleral icterus. EOMI.  Neck:. Soft and supple. Full ROM without pain.  Cardiac: Irregular rate and irregular rhythm. +S1/S2. Peripheral pulses 2+ and symmetric.    Resp: Speaking in full sentences. decreased air entry at the right +rales  Abd: Soft, non-tender, non-distended. Normal bowel sounds in all 4 quadrants. No guarding or rebound.  Back: Spine midline and non-tender. No CVAT.  Skin: No rashes, abrasions or lacerations.  Lymph: No cervical lymphadenopathy.  Neuro: Awake, alert & oriented x 3. Moves all extremities symmetrically. follows commands, motor simon upper and lower ext 5/5, sensory symm and intact CN 2-12 grossly intact, no ataxia, no nystagmus, no dysmetria, no ddk, symm simon, no pronator drift

## 2020-10-09 NOTE — H&P ADULT - HISTORY OF PRESENT ILLNESS
85 year old F with PMH of HTN, CHF, cirrhosis with ascites, HLD, DM, AFib (no A/C) presented to ED s/p fall, reported as mechanical fall, tripped and fell, denies loss of consciousness or head injury. Patient stated she had worsening shortness of breath for the past 3 days. Patient was evaluated by her cardiologist Dr. Camp and pulmonologist Dr. Cotto on 08/2020, CXR outpatient which showed right sided pleural effusion and was recommended to get drained and was scheduled for follow up visit today 10/09. In the Ed pt was hypoxic, tachypneic and mild to moderate respiratory distress requiring 4L NC, CXR showed large pleural effusion, given Lasix 80mg IV push, evaluated by ct surgery for possible chest tube placement, pending Ct chest w/o contrast. Patient denies fevers/chills, dizziness, headache, chest pain, palpitation, cough, abd pain/n/v/d, urinary symptoms, recent travel or sick contact.

## 2020-10-09 NOTE — ED ADULT TRIAGE NOTE - CHIEF COMPLAINT QUOTE
pt was found of floor by EMS pt states she slide out of chair. pt states she has been short of breath for days is needing drain put into lung.

## 2020-10-09 NOTE — ED ADULT NURSE NOTE - NSIMPLEMENTINTERV_GEN_ALL_ED
Implemented All Fall with Harm Risk Interventions:  [place] to call system. Call bell, personal items and telephone within reach. Instruct patient to call for assistance. Room bathroom lighting operational. Non-slip footwear when patient is off stretcher. Physically safe environment: no spills, clutter or unnecessary equipment. Stretcher in lowest position, wheels locked, appropriate side rails in place. Provide visual cue, wrist band, yellow gown, etc. Monitor gait and stability. Monitor for mental status changes and reorient to person, place, and time. Review medications for side effects contributing to fall risk. Reinforce activity limits and safety measures with patient and family. Provide visual clues: red socks.

## 2020-10-09 NOTE — CONSULT NOTE ADULT - ASSESSMENT
-Large R Pleural effusion with hx cirrhosis and ascites; likely from liver dz; contributed to by CHF and renal insufficiency,  -Hx cirrhosis  -Renal insufficiency  -SOB  -Hypoxia; see above    RECC:  Continue chest tube to suction. F/U labs inc chem, culture, cyto. O2 to keep sat >89%. DVT proph. Cardio f/u. GI f/u.     Will follow.

## 2020-10-09 NOTE — CONSULT NOTE ADULT - SUBJECTIVE AND OBJECTIVE BOX
Formerly [facility], [facility]                [address] [place] [address]                                     PHONE: [phone]                                       FAX: [phone]  [email or web address]      Reason for Consult: shortness of breath     HPI:  Ms Jean-Baptiste is an obese 86 y/o woman with hyperlipidemia, type 2 diabetes mellitus, psoriatic arthritis and rheumatoid arthritis, pulmonary hypertension, cirrhosis of liver, chronic large right pleural effusion presumed to be related to her cirrhosis who presents with fall and concerns related to progressively worsening shortness of breath 3 days duration.     PAST MEDICAL & SURGICAL HISTORY:  Ascites  Cirrhosis  DM (diabetes mellitus)  CHF (congestive heart failure)  HTN (hypertension)  S/P hernia repair    PREVIOUS DIAGNOSTIC TESTING:      EKG: sinus rhythm with frequent PACs, anteroseptal infarct age indeterminate      ECHO FINDINGS: 2020 LVEF normal 60% normal RV size and contractility mild MAC mild MR aortic sclerosis without stenosis moderate TR estimated RVSP 45 mmHg, minimal pericardial effusion anterior to the right atrium, large right pleural effusion    MEDICATIONS  (STANDING):  ATENolol  Tablet 50 milliGRAM(s) Oral two times a day  cloNIDine 0.2 milliGRAM(s) Oral two times a day  dextrose 5%. 1000 milliLiter(s) (50 mL/Hr) IV Continuous <Continuous>  dextrose 50% Injectable 12.5 Gram(s) IV Push once  dextrose 50% Injectable 25 Gram(s) IV Push once  dextrose 50% Injectable 25 Gram(s) IV Push once  furosemide   Injectable 40 milliGRAM(s) IV Push two times a day  insulin lispro (HumaLOG) corrective regimen sliding scale   SubCutaneous three times a day before meals  predniSONE   Tablet 2.5 milliGRAM(s) Oral daily  spironolactone 25 milliGRAM(s) Oral daily    MEDICATIONS  (PRN):  dextrose 40% Gel 15 Gram(s) Oral once PRN Blood Glucose LESS THAN 70 milliGRAM(s)/deciliter  glucagon  Injectable 1 milliGRAM(s) IntraMuscular once PRN Glucose LESS THAN 70 milligrams/deciliter      FAMILY HISTORY:  No family history of diabetes mellitus    SOCIAL HISTORY:  CIGARETTES: denies   ALCOHOL: denies     ROS: Negative other than as mentioned in HPI.    Vital Signs Last 24 Hrs  T(C): 36.6 (09 Oct 2020 07:35), Max: 36.8 (09 Oct 2020 00:58)  T(F): 97.9 (09 Oct 2020 07:35), Max: 98.3 (09 Oct 2020 00:58)  HR: 69 (09 Oct 2020 07:35) (69 - 84)  BP: 138/75 (09 Oct 2020 07:35) (138/75 - 215/115)  BP(mean): --  RR: 22 (09 Oct 2020 07:35) (22 - 22)  SpO2: 99% (09 Oct 2020 07:35) (86% - 99%)    PHYSICAL EXAM:  General: Appears well developed, well nourished alert and cooperative.  HEENT: Head; normocephalic, atraumatic. sclera anicteric, MMM, no JVD, neck is supple   CARDIOVASCULAR; 1/6 MISTI, no rub, gallop or lift. Normal S1 and S2.  LUNGS; No rales, decreased breath sounds R>L   ABDOMEN ; Soft, nontender without mass or organomegaly. bowel sounds normoactive.  EXTREMITIES; No clubbing, cyanosis or edema. Distal pulses wnl. ROM normal.  SKIN; warm and dry with normal turgor.  NEURO; Alert/oriented x 3/normal motor exam.     PSYCH; normal affect.        I&O's Detail      LABS:                        13.6   8.04  )-----------( 190      ( 09 Oct 2020 01:49 )             42.0     10-    136  |  101  |  59.0<H>  ----------------------------<  293<H>  4.7   |  24.0  |  1.81<H>    Ca    8.7      09 Oct 2020 07:34  Mg     2.0     10-09    TPro  6.8  /  Alb  2.8<L>  /  TBili  0.6  /  DBili  x   /  AST  35<H>  /  ALT  19  /  AlkPhos  163<H>  10-09    CARDIAC MARKERS ( 09 Oct 2020 01:49 )  x     / 0.03 ng/mL / x     / x     / x          PT/INR - ( 09 Oct 2020 01:49 )   PT: 11.9 sec;   INR: 1.03 ratio         PTT - ( 09 Oct 2020 01:49 )  PTT:29.7 sec  Urinalysis Basic - ( 09 Oct 2020 06:26 )    Color: Yellow / Appearance: Clear / S.010 / pH: x  Gluc: x / Ketone: Negative  / Bili: Negative / Urobili: Negative mg/dL   Blood: x / Protein: 100 mg/dL / Nitrite: Negative   Leuk Esterase: Negative / RBC: 11-25 /HPF / WBC 3-5   Sq Epi: x / Non Sq Epi: Occasional / Bacteria: Few      I&O's Summary      RADIOLOGY & ADDITIONAL STUDIES: [facility], [facility]                [address] [place] [address]                                     PHONE: [phone]                                       FAX: [phone]  [email or web address]      Reason for Consult: shortness of breath     HPI:  Ms Jean-Baptiste is an 84 y/o woman with hyperlipidemia, type 2 diabetes mellitus, psoriatic arthritis and rheumatoid arthritis, pulmonary hypertension, cirrhosis of liver, chronic large right pleural effusion presumed to be related to her cirrhosis who presents with fall and concerns related to progressively worsening shortness of breath 3 days duration.     PAST MEDICAL & SURGICAL HISTORY:  Ascites  Cirrhosis  DM (diabetes mellitus)  CHF (congestive heart failure)  HTN (hypertension)  S/P hernia repair    PREVIOUS DIAGNOSTIC TESTING:      EKG: sinus rhythm with frequent PACs, anteroseptal infarct age indeterminate      ECHO FINDINGS: 2020 LVEF normal 60% normal RV size and contractility mild MAC mild MR aortic sclerosis without stenosis moderate TR estimated RVSP 45 mmHg, minimal pericardial effusion anterior to the right atrium, large right pleural effusion    MEDICATIONS  (STANDING):  ATENolol  Tablet 50 milliGRAM(s) Oral two times a day  cloNIDine 0.2 milliGRAM(s) Oral two times a day  dextrose 5%. 1000 milliLiter(s) (50 mL/Hr) IV Continuous <Continuous>  dextrose 50% Injectable 12.5 Gram(s) IV Push once  dextrose 50% Injectable 25 Gram(s) IV Push once  dextrose 50% Injectable 25 Gram(s) IV Push once  furosemide   Injectable 40 milliGRAM(s) IV Push two times a day  insulin lispro (HumaLOG) corrective regimen sliding scale   SubCutaneous three times a day before meals  predniSONE   Tablet 2.5 milliGRAM(s) Oral daily  spironolactone 25 milliGRAM(s) Oral daily    MEDICATIONS  (PRN):  dextrose 40% Gel 15 Gram(s) Oral once PRN Blood Glucose LESS THAN 70 milliGRAM(s)/deciliter  glucagon  Injectable 1 milliGRAM(s) IntraMuscular once PRN Glucose LESS THAN 70 milligrams/deciliter      FAMILY HISTORY:  No family history of diabetes mellitus    SOCIAL HISTORY:  CIGARETTES: denies   ALCOHOL: denies     ROS: Negative other than as mentioned in HPI.    Vital Signs Last 24 Hrs  T(C): 36.6 (09 Oct 2020 07:35), Max: 36.8 (09 Oct 2020 00:58)  T(F): 97.9 (09 Oct 2020 07:35), Max: 98.3 (09 Oct 2020 00:58)  HR: 69 (09 Oct 2020 07:35) (69 - 84)  BP: 138/75 (09 Oct 2020 07:35) (138/75 - 215/115)  BP(mean): --  RR: 22 (09 Oct 2020 07:35) (22 - 22)  SpO2: 99% (09 Oct 2020 07:35) (86% - 99%)    PHYSICAL EXAM:  General: Appears well developed, well nourished alert and cooperative.  HEENT: Head; normocephalic, atraumatic. sclera anicteric, MMM, no JVD, neck is supple   CARDIOVASCULAR; 1/6 MISTI, no rub, gallop or lift. Normal S1 and S2.  LUNGS; No rales, decreased breath sounds R>L   ABDOMEN ; Soft, nontender without mass or organomegaly. bowel sounds normoactive.  EXTREMITIES; No clubbing, cyanosis, (+) edema   SKIN; warm and dry with normal turgor.  NEURO; Alert/oriented x 3/normal motor exam.     PSYCH; normal affect.        I&O's Detail      LABS:                        13.6   8.04  )-----------( 190      ( 09 Oct 2020 01:49 )             42.0     10    136  |  101  |  59.0<H>  ----------------------------<  293<H>  4.7   |  24.0  |  1.81<H>    Ca    8.7      09 Oct 2020 07:34  Mg     2.0     10-    TPro  6.8  /  Alb  2.8<L>  /  TBili  0.6  /  DBili  x   /  AST  35<H>  /  ALT  19  /  AlkPhos  163<H>  10-09    CARDIAC MARKERS ( 09 Oct 2020 01:49 )  x     / 0.03 ng/mL / x     / x     / x          PT/INR - ( 09 Oct 2020 01:49 )   PT: 11.9 sec;   INR: 1.03 ratio         PTT - ( 09 Oct 2020 01:49 )  PTT:29.7 sec  Urinalysis Basic - ( 09 Oct 2020 06:26 )    Color: Yellow / Appearance: Clear / S.010 / pH: x  Gluc: x / Ketone: Negative  / Bili: Negative / Urobili: Negative mg/dL   Blood: x / Protein: 100 mg/dL / Nitrite: Negative   Leuk Esterase: Negative / RBC: 11-25 /HPF / WBC 3-5   Sq Epi: x / Non Sq Epi: Occasional / Bacteria: Few      I&O's Summary      RADIOLOGY & ADDITIONAL STUDIES:  ches< from: CT Chest No Cont (10.09.20 @ 06:32) >  Very large right pleural effusion with shift of the mediastinum to the left. This is consistent with a tension pleural effusion. Complete collapse of the right lung.    < end of copied text >   [facility], [facility]                [address] [place] [address]                                     PHONE: [phone]                                       FAX: [phone]  [email or web address]      Reason for Consult: shortness of breath     HPI:  Ms Jean-Baptiste is an 86 y/o woman with hyperlipidemia, type 2 diabetes mellitus, psoriatic arthritis and rheumatoid arthritis, pulmonary hypertension, cirrhosis of liver, chronic large right pleural effusion presumed to be related to her cirrhosis who presents with fall and concerns related to progressively worsening shortness of breath 3 days duration.     PAST MEDICAL & SURGICAL HISTORY:  Ascites  Cirrhosis  DM (diabetes mellitus)  CHF (congestive heart failure)  HTN (hypertension)  S/P hernia repair    PREVIOUS DIAGNOSTIC TESTING:      EKG: sinus rhythm with frequent PACs, anteroseptal infarct age indeterminate      ECHO FINDINGS: 2020 LVEF normal 60% normal RV size and contractility mild MAC mild MR aortic sclerosis without stenosis moderate TR estimated RVSP 45 mmHg, minimal pericardial effusion anterior to the right atrium, large right pleural effusion    MEDICATIONS  (STANDING):  ATENolol  Tablet 50 milliGRAM(s) Oral two times a day  cloNIDine 0.2 milliGRAM(s) Oral two times a day  dextrose 5%. 1000 milliLiter(s) (50 mL/Hr) IV Continuous <Continuous>  dextrose 50% Injectable 12.5 Gram(s) IV Push once  dextrose 50% Injectable 25 Gram(s) IV Push once  dextrose 50% Injectable 25 Gram(s) IV Push once  furosemide   Injectable 40 milliGRAM(s) IV Push two times a day  insulin lispro (HumaLOG) corrective regimen sliding scale   SubCutaneous three times a day before meals  predniSONE   Tablet 2.5 milliGRAM(s) Oral daily  spironolactone 25 milliGRAM(s) Oral daily    MEDICATIONS  (PRN):  dextrose 40% Gel 15 Gram(s) Oral once PRN Blood Glucose LESS THAN 70 milliGRAM(s)/deciliter  glucagon  Injectable 1 milliGRAM(s) IntraMuscular once PRN Glucose LESS THAN 70 milligrams/deciliter      FAMILY HISTORY:  No family history of diabetes mellitus    SOCIAL HISTORY:  CIGARETTES: denies   ALCOHOL: denies     ROS: Negative other than as mentioned in HPI.    Vital Signs Last 24 Hrs  T(C): 36.6 (09 Oct 2020 07:35), Max: 36.8 (09 Oct 2020 00:58)  T(F): 97.9 (09 Oct 2020 07:35), Max: 98.3 (09 Oct 2020 00:58)  HR: 69 (09 Oct 2020 07:35) (69 - 84)  BP: 138/75 (09 Oct 2020 07:35) (138/75 - 215/115)  BP(mean): --  RR: 22 (09 Oct 2020 07:35) (22 - 22)  SpO2: 99% (09 Oct 2020 07:35) (86% - 99%)    PHYSICAL EXAM:  General: Appears well developed, well nourished alert and cooperative.  HEENT: Head; normocephalic, atraumatic. sclera anicteric, MMM, no JVD, neck is supple   CARDIOVASCULAR; 1/6 MISTI, no rub, gallop or lift. Normal S1 and S2.  LUNGS; No rales, absent breath sounds on the right, decreased breath sounds at the lung bases   ABDOMEN ; Soft, nontender without mass or organomegaly. bowel sounds normoactive.  EXTREMITIES; No clubbing, cyanosis, (+) edema   SKIN; warm and dry with normal turgor.  NEURO; Alert/oriented x 3/normal motor exam.     PSYCH; normal affect.        I&O's Detail      LABS:                        13.6   8.04  )-----------( 190      ( 09 Oct 2020 01:49 )             42.0     10-    136  |  101  |  59.0<H>  ----------------------------<  293<H>  4.7   |  24.0  |  1.81<H>    Ca    8.7      09 Oct 2020 07:34  Mg     2.0     10-09    TPro  6.8  /  Alb  2.8<L>  /  TBili  0.6  /  DBili  x   /  AST  35<H>  /  ALT  19  /  AlkPhos  163<H>  10-09    CARDIAC MARKERS ( 09 Oct 2020 01:49 )  x     / 0.03 ng/mL / x     / x     / x          PT/INR - ( 09 Oct 2020 01:49 )   PT: 11.9 sec;   INR: 1.03 ratio         PTT - ( 09 Oct 2020 01:49 )  PTT:29.7 sec  Urinalysis Basic - ( 09 Oct 2020 06:26 )    Color: Yellow / Appearance: Clear / S.010 / pH: x  Gluc: x / Ketone: Negative  / Bili: Negative / Urobili: Negative mg/dL   Blood: x / Protein: 100 mg/dL / Nitrite: Negative   Leuk Esterase: Negative / RBC: 11-25 /HPF / WBC 3-5   Sq Epi: x / Non Sq Epi: Occasional / Bacteria: Few      I&O's Summary      RADIOLOGY & ADDITIONAL STUDIES:  ches< from: CT Chest No Cont (10.09.20 @ 06:32) >  Very large right pleural effusion with shift of the mediastinum to the left. This is consistent with a tension pleural effusion. Complete collapse of the right lung.    < end of copied text >

## 2020-10-09 NOTE — ED PROVIDER NOTE - NS ED ROS FT
Review of Systems:  	•	CONSTITUTIONAL - no fever, no diaphoresis, no weight change  	•	SKIN - no rash  	•	HEMATOLOGIC - no bleeding, no bruising  	•	EYES - no eye pain, no blurred vision  	•	ENT - no change in hearing, no pain  	•	RESPIRATORY - +shortness of breath, no cough  	•	CARDIAC - no chest pain, no palpitations  	•	GI - no abd pain, no nausea, no vomiting, no diarrhea, no constipation, no bleeding  	•	GENITO-URINARY - no vaginal bleeding, no discharge, no dysuria; no hematuria  	•	ENDO - no polydypsia, no polyurea, no heat/no cold intolerance  	•	MUSCULOSKELETAL - no joint pain, no swelling, no redness  	•	NEUROLOGIC - +weakness, no headache, no anesthesia, no paresthesias  	•	PSYCH - no anxiety, non suicidal, non homicidal, no hallucination, no depression  	•	ALLERGY - no rhinitis

## 2020-10-09 NOTE — CONSULT NOTE ADULT - ASSESSMENT
Ms Jean-Bpatiste is an 86 y/o woman with hyperlipidemia, type 2 diabetes mellitus, psoriatic arthritis and rheumatoid arthritis, pulmonary hypertension, cirrhosis of liver, chronic large right pleural effusion presumed to be related to her cirrhosis who presents with fall and concerns related to progressively worsening shortness of breath 3 days duration.   CT chest with large right pleural effusion with left shift with complete collapse of the right lung     Dyspnea: likely related to her large pleural effusion/cirrhosis   - will benefit from thoracentesis given her tension pleural effusion    - recent echocardiogram with normal biventricular function without significant valvular dysfunction   - repeat EKG ( no prior history of AF from outpatient cardiology notes, EKG is with sinus with frequent PACs)  - continue spironolactone 25mg   - add lasix 40mg IV daily with strict I/O   - goal K>4 and Mg>2   - telemetry monitoring Ms Jean-Baptiste is an 84 y/o woman with hyperlipidemia, type 2 diabetes mellitus, psoriatic arthritis and rheumatoid arthritis, pulmonary hypertension, cirrhosis of liver, chronic large right pleural effusion presumed to be related to her cirrhosis who presents with fall and concerns related to progressively worsening shortness of breath 3 days duration.   CT chest with large right pleural effusion with left shift with complete collapse of the right lung     Dyspnea: likely related to her large pleural effusion/cirrhosis   - will benefit from thoracentesis given her tension pleural effusion    - recent echocardiogram with normal biventricular function without significant valvular dysfunction   - repeat EKG ( no prior history of AF from outpatient cardiology notes, EKG is with sinus with frequent PACs)  - continue spironolactone 25mg   - add lasix 40mg IV daily with strict I/O; hypervolemic secondary to cirrhosis and not heart failure   - goal K>4 and Mg>2   - telemetry monitoring

## 2020-10-09 NOTE — ED PROVIDER NOTE - CLINICAL SUMMARY MEDICAL DECISION MAKING FREE TEXT BOX
84yo female with pmh of HTN, HLD, DM, CHF, cirrhosis, afib (from review of chart don't see ac, just rate controlled) presents s/p fall and also with sob. Pt with large right pleural effusion with possible chf exacerbation, in no resp distress at this time, responding well to NC, ct surgery consulted, will admit for further management

## 2020-10-09 NOTE — CONSULT NOTE ADULT - SUBJECTIVE AND OBJECTIVE BOX
HPI: 85 year old F with PMH of HTN, CHF, cirrhosis, HLD, DM, AFib (no A/C) presented to ED s/p fall, reported as mechanical fall, tripped and fell, denies loss of consciousness or head injury. Patient stated she had worsening shortness of breath for the past 3 days. Patient was evaluated by her cardiologist Dr. Camp and pulmonologist Dr. Cotto on 08/2020,  CXR with right sided pleural effusion and was recommended to get drained and was scheduled for follow up visit today 10/09.  Patient denies fevers/chills, dizziness, headache, chest pain, palpitation, cough, abd pain/n/v/d, urinary symptoms, recent travel or sick contact.       PAST MEDICAL & SURGICAL HISTORY:  Afib    DM (diabetes mellitus)    CHF (congestive heart failure)    HTN (hypertension)      SOCIAL HISTORY:  Smoker: [ ] Yes  [ ] No        PACK YEARS:                         WHEN QUIT?  ETOH use: [ ] Yes  [ ] No              FREQUENCY / QUANTITY:  Ilicit Drug use:  [ ] Yes  [ ] No  Occupation:  Live with:  Assist device use:    Relevant Family History  FAMILY HISTORY:      Review of Systems  GENERAL:  Fevers[] chills[] sweats[] fatigue[] weight loss[] weight gain []                                      [ ] NEGATIVE  NEURO:  parathesias[] seizures []  syncope []  confusion []                                                                [ ] NEGATIVE                 EYES: glasses[]  blurry vision[]  discharge[] pain[] glaucoma []                                                           [ ] NEGATIVE                 ENMT:  difficulty hearing []  vertigo[]  dysphagia[] epistaxis[] recent dental work []                     [ ] NEGATIVE                 CV:  chest pain[] palpitations[] ANGUIANO [] diaphoresis [] edema[]                                                           [ ] NEGATIVE                                 RESPIRATORY:  wheezing[] SOB[] cough [] sputum[] hemoptysis[]                                                   [ ] NEGATIVE               GI:  nausea[]  vomiting []  diarrhea[] constipation [] melena []                                                          [ ] NEGATIVE            : hematuria[ ]  dysuria[ ] urgency[] incontinence[]                                                                          [ ] NEGATIVE                    MUSKULOSKELETAL:  arthritis[ ]  joint swelling [ ] muscle weakness [ ]                                            [ ] NEGATIVE                     SKIN/BREAST:  rash[ ] itching [ ]  hair loss[ ] masses[ ]                                                                          [ ] NEGATIVE                     PSYCH:  dementia [ ] depression [ ] anxiety[ ]                                                                                          [ ] NEGATIVE                        HEME/LYMPH:  bruises easily[ ] enlarged lymph nodes[ ] tender lymph nodes[ ]                           [ ] NEGATIVE                      ENDOCRINE:  cold intolerance[ ] heat intolerance[ ] polydipsia[ ]                                                      [ ] NEGATIVE                            PHYSICAL EXAM  General: well developed, no acute distress                                    Neuro: Awake, alert, oriented x4                 Lungs:  Diminished breath sounds on right, +rales on the right base.                                                                       CV: Irregular rate, S1, S2   Abdomen:  soft, nontender, nondistended, positive bowel sounds  Extremities:  warm, well perfused, +DP pulses bilaterally        Vital Signs Last 24 Hrs  T(C): 36.8 (09 Oct 2020 00:58), Max: 36.8 (09 Oct 2020 00:58)  T(F): 98.3 (09 Oct 2020 00:58), Max: 98.3 (09 Oct 2020 00:58)  HR: 84 (09 Oct 2020 02:53) (74 - 84)  BP: 215/115 (09 Oct 2020 02:53) (188/108 - 215/115)  BP(mean): --  RR: 22 (09 Oct 2020 00:58) (22 - 22)  SpO2: 86% (09 Oct 2020 02:53) (86% - 95%) on 2L O2                                                          LABS:                        13.6   8.04  )-----------( 190      ( 09 Oct 2020 01:49 )             42.0     10-09    136  |  100  |  57.0<H>  ----------------------------<  332<H>  4.5   |  23.0  |  1.83<H>    Ca    8.8      09 Oct 2020 01:49  Mg     2.0     10-09    TPro  6.8  /  Alb  2.8<L>  /  TBili  0.6  /  DBili  x   /  AST  35<H>  /  ALT  19  /  AlkPhos  163<H>  10-09    PT/INR - ( 09 Oct 2020 01:49 )   PT: 11.9 sec;   INR: 1.03 ratio         PTT - ( 09 Oct 2020 01:49 )  PTT:29.7 sec    CARDIAC MARKERS ( 09 Oct 2020 01:49 )  x     / 0.03 ng/mL / x     / x     / x                     HPI: 85 year old F with PMH of HTN, CHF, cirrhosis, HLD, DM, AFib (no A/C) presented to ED s/p fall, reported as mechanical fall, tripped and fell, denies loss of consciousness or head injury. Patient stated she had worsening shortness of breath for the past 3 days. Patient was evaluated by her cardiologist Dr. Camp and pulmonologist Dr. Cotto on 08/2020,  CXR with right sided pleural effusion and was recommended to get drained and was scheduled for follow up visit today 10/09.  Patient denies fevers/chills, dizziness, headache, chest pain, palpitation, cough, abd pain/n/v/d, urinary symptoms, recent travel or sick contact.       PAST MEDICAL & SURGICAL HISTORY:  Afib    DM (diabetes mellitus)    CHF (congestive heart failure)    HTN (hypertension)      SOCIAL HISTORY:  Smoker: [ ] Yes  [ ] No        PACK YEARS:                         WHEN QUIT?  ETOH use: [ ] Yes  [ ] No              FREQUENCY / QUANTITY:  Ilicit Drug use:  [ ] Yes  [ ] No  Occupation:  Live with:  Assist device use:    Relevant Family History  FAMILY HISTORY:      Review of Systems  GENERAL:  Fevers[] chills[] sweats[] fatigue[] weight loss[] weight gain []                                      [ x] NEGATIVE  NEURO:  parathesias[] seizures []  syncope []  confusion []                                                               [x ] NEGATIVE                 EYES: glasses[]  blurry vision[]  discharge[] pain[] glaucoma []                                                           [x ] NEGATIVE                 ENMT:  difficulty hearing []  vertigo[]  dysphagia[] epistaxis[] recent dental work []                     [ x] NEGATIVE                 CV:  chest pain[] palpitations[] ANGUIANO [] diaphoresis [] edema[]                                                           [ x] NEGATIVE                                 RESPIRATORY:  wheezing[] SOB[x] cough [] sputum[] hemoptysis[]                                                   [ ] NEGATIVE               GI:  nausea[]  vomiting []  diarrhea[] constipation [] melena []                                                          [ x] NEGATIVE            : hematuria[ ]  dysuria[ ] urgency[] incontinence[]                                                                          [x ] NEGATIVE                    MUSKULOSKELETAL:  arthritis[ ]  joint swelling [ ] muscle weakness [ ]                                            [x ] NEGATIVE                     SKIN/BREAST:  rash[ ] itching [ ]  hair loss[ ] masses[ ]                                                                          [ x] NEGATIVE                     PSYCH:  dementia [ ] depression [ ] anxiety[ ]                                                                                          [x ] NEGATIVE                        HEME/LYMPH:  bruises easily[ ] enlarged lymph nodes[ ] tender lymph nodes[ ]                           [ x] NEGATIVE                      ENDOCRINE:  cold intolerance[ ] heat intolerance[ ] polydipsia[ ]                                                      [ x] NEGATIVE                            PHYSICAL EXAM  General: well developed,                                 Neuro: Awake, alert, oriented x4                 Lungs:  Diminished breath sounds on right, +rales on the right base, +use of accessory muscle.                                                                     CV: Irregular rate, S1, S2   Abdomen:  soft, nontender, nondistended, positive bowel sounds  Extremities:  warm, well perfused, +DP pulses bilaterally  Psych: Appropriate mood and affect.       Vital Signs Last 24 Hrs  T(C): 36.8 (09 Oct 2020 00:58), Max: 36.8 (09 Oct 2020 00:58)  T(F): 98.3 (09 Oct 2020 00:58), Max: 98.3 (09 Oct 2020 00:58)  HR: 84 (09 Oct 2020 02:53) (74 - 84)  BP: 215/115 (09 Oct 2020 02:53) (188/108 - 215/115)  BP(mean): --  RR: 22 (09 Oct 2020 00:58) (22 - 22)  SpO2: 86% (09 Oct 2020 02:53) (86% - 95%) on 2L O2                                                          LABS:                        13.6   8.04  )-----------( 190      ( 09 Oct 2020 01:49 )             42.0     10-09    136  |  100  |  57.0<H>  ----------------------------<  332<H>  4.5   |  23.0  |  1.83<H>    Ca    8.8      09 Oct 2020 01:49  Mg     2.0     10-09    TPro  6.8  /  Alb  2.8<L>  /  TBili  0.6  /  DBili  x   /  AST  35<H>  /  ALT  19  /  AlkPhos  163<H>  10-09    PT/INR - ( 09 Oct 2020 01:49 )   PT: 11.9 sec;   INR: 1.03 ratio         PTT - ( 09 Oct 2020 01:49 )  PTT:29.7 sec    CARDIAC MARKERS ( 09 Oct 2020 01:49 )  x     / 0.03 ng/mL / x     / x     / x                     Cardiologist: Dr. Camp  Pulmonologist: Dr. Cotto     HPI: 85 year old F with PMH of HTN, CHF, cirrhosis, HLD, DM, AFib (no A/C) presented to ED s/p fall, reported as mechanical fall, tripped and fell, denies loss of consciousness or head injury. Patient stated she had worsening shortness of breath for the past 3 days. Patient was evaluated by her cardiologist Dr. Camp and pulmonologist Dr. Cotto on 08/2020,  CXR with right sided pleural effusion and was recommended to get drained and was scheduled for follow up visit today 10/09.  Patient denies fevers/chills, dizziness, headache, chest pain, palpitation, cough, abd pain/n/v/d, urinary symptoms, recent travel or sick contact.       PAST MEDICAL & SURGICAL HISTORY:  Afib    DM (diabetes mellitus)    CHF (congestive heart failure)    HTN (hypertension)      SOCIAL HISTORY:  Smoker: [ ] Yes  [ ] No        PACK YEARS:                         WHEN QUIT?  ETOH use: [ ] Yes  [ ] No              FREQUENCY / QUANTITY:  Ilicit Drug use:  [ ] Yes  [ ] No  Occupation:  Live with:  Assist device use:    Relevant Family History  FAMILY HISTORY:      Review of Systems  GENERAL:  Fevers[] chills[] sweats[] fatigue[] weight loss[] weight gain []                                      [ x] NEGATIVE  NEURO:  parathesias[] seizures []  syncope []  confusion []                                                               [x ] NEGATIVE                 EYES: glasses[]  blurry vision[]  discharge[] pain[] glaucoma []                                                           [x ] NEGATIVE                 ENMT:  difficulty hearing []  vertigo[]  dysphagia[] epistaxis[] recent dental work []                     [ x] NEGATIVE                 CV:  chest pain[] palpitations[] ANGUIANO [] diaphoresis [] edema[]                                                           [ x] NEGATIVE                                 RESPIRATORY:  wheezing[] SOB[x] cough [] sputum[] hemoptysis[]                                                   [ ] NEGATIVE               GI:  nausea[]  vomiting []  diarrhea[] constipation [] melena []                                                          [ x] NEGATIVE            : hematuria[ ]  dysuria[ ] urgency[] incontinence[]                                                                          [x ] NEGATIVE                    MUSKULOSKELETAL:  arthritis[ ]  joint swelling [ ] muscle weakness [ ]                                            [x ] NEGATIVE                     SKIN/BREAST:  rash[ ] itching [ ]  hair loss[ ] masses[ ]                                                                          [ x] NEGATIVE                     PSYCH:  dementia [ ] depression [ ] anxiety[ ]                                                                                          [x ] NEGATIVE                        HEME/LYMPH:  bruises easily[ ] enlarged lymph nodes[ ] tender lymph nodes[ ]                           [ x] NEGATIVE                      ENDOCRINE:  cold intolerance[ ] heat intolerance[ ] polydipsia[ ]                                                      [ x] NEGATIVE                            PHYSICAL EXAM  General: well developed,                                 Neuro: Awake, alert, oriented x4                 Lungs:  Diminished breath sounds on right, +rales on the right base, +use of accessory muscle.                                                                     CV: Irregular rate, S1, S2   Abdomen:  soft, nontender, nondistended, positive bowel sounds  Extremities:  warm, well perfused, +DP pulses bilaterally  Psych: Appropriate mood and affect.       Vital Signs Last 24 Hrs  T(C): 36.8 (09 Oct 2020 00:58), Max: 36.8 (09 Oct 2020 00:58)  T(F): 98.3 (09 Oct 2020 00:58), Max: 98.3 (09 Oct 2020 00:58)  HR: 84 (09 Oct 2020 02:53) (74 - 84)  BP: 215/115 (09 Oct 2020 02:53) (188/108 - 215/115)  BP(mean): --  RR: 22 (09 Oct 2020 00:58) (22 - 22)  SpO2: 86% (09 Oct 2020 02:53) (86% - 95%) on 2L O2                                                          LABS:                        13.6   8.04  )-----------( 190      ( 09 Oct 2020 01:49 )             42.0     10-09    136  |  100  |  57.0<H>  ----------------------------<  332<H>  4.5   |  23.0  |  1.83<H>    Ca    8.8      09 Oct 2020 01:49  Mg     2.0     10-09    TPro  6.8  /  Alb  2.8<L>  /  TBili  0.6  /  DBili  x   /  AST  35<H>  /  ALT  19  /  AlkPhos  163<H>  10-09    PT/INR - ( 09 Oct 2020 01:49 )   PT: 11.9 sec;   INR: 1.03 ratio         PTT - ( 09 Oct 2020 01:49 )  PTT:29.7 sec    CARDIAC MARKERS ( 09 Oct 2020 01:49 )  x     / 0.03 ng/mL / x     / x     / x                     Cardiologist: Dr. Camp  Pulmonologist: Dr. Cotto     HPI: 85 year old F with PMH of HTN, CHF, cirrhosis, HLD, DM, AFib (no A/C) presented to ED s/p fall, reported as mechanical fall, tripped and fell, denies loss of consciousness or head injury. Patient stated she had worsening shortness of breath for the past 3 days. Patient was evaluated by her cardiologist Dr. Camp and pulmonologist Dr. Cotto on 08/2020,  CXR with right sided pleural effusion and was recommended to get drained and was scheduled for follow up visit today 10/09.  Patient denies fevers/chills, dizziness, headache, chest pain, palpitation, cough, abd pain/n/v/d, urinary symptoms, recent travel or sick contact.       PAST MEDICAL & SURGICAL HISTORY:  Afib    DM (diabetes mellitus)    CHF (congestive heart failure)    HTN (hypertension)      SOCIAL HISTORY:  Smoker: [ ] Yes  [ ] No        PACK YEARS:                         WHEN QUIT?  ETOH use: [ ] Yes  [ ] No              FREQUENCY / QUANTITY:  Ilicit Drug use:  [ ] Yes  [ ] No  Occupation:  Live with:  Assist device use:    Relevant Family History  FAMILY HISTORY:      Review of Systems  GENERAL:  Fevers[] chills[] sweats[] fatigue[] weight loss[] weight gain []                                      [ x] NEGATIVE  NEURO:  parathesias[] seizures []  syncope []  confusion []                                                               [x ] NEGATIVE                 EYES: glasses[]  blurry vision[]  discharge[] pain[] glaucoma []                                                           [x ] NEGATIVE                 ENMT:  difficulty hearing []  vertigo[]  dysphagia[] epistaxis[] recent dental work []                     [ x] NEGATIVE                 CV:  chest pain[] palpitations[] ANGUIANO [] diaphoresis [] edema[]                                                           [ x] NEGATIVE                                 RESPIRATORY:  wheezing[] SOB[x] cough [] sputum[] hemoptysis[]                                                   [ ] NEGATIVE               GI:  nausea[]  vomiting []  diarrhea[] constipation [] melena []                                                          [ x] NEGATIVE            : hematuria[ ]  dysuria[ ] urgency[] incontinence[]                                                                          [x ] NEGATIVE                    MUSKULOSKELETAL:  arthritis[ ]  joint swelling [ ] muscle weakness [ ]                                            [x ] NEGATIVE                     SKIN/BREAST:  rash[ ] itching [ ]  hair loss[ ] masses[ ]                                                                          [ x] NEGATIVE                     PSYCH:  dementia [ ] depression [ ] anxiety[ ]                                                                                          [x ] NEGATIVE                        HEME/LYMPH:  bruises easily[ ] enlarged lymph nodes[ ] tender lymph nodes[ ]                           [ x] NEGATIVE                      ENDOCRINE:  cold intolerance[ ] heat intolerance[ ] polydipsia[ ]                                                      [ x] NEGATIVE                            PHYSICAL EXAM  General: well developed,                                 Neuro: Awake, alert, oriented x4                 Lungs:  Diminished breath sounds on right, +rales on the right base, +use of accessory muscle.                                                                     CV: Irregular rate, S1, S2   Abdomen:  soft, nontender, nondistended, positive bowel sounds  Extremities:  warm, well perfused, +pitting edema, +DP pulses bilaterally  Psych: Appropriate mood and affect.       Vital Signs Last 24 Hrs  T(C): 36.8 (09 Oct 2020 00:58), Max: 36.8 (09 Oct 2020 00:58)  T(F): 98.3 (09 Oct 2020 00:58), Max: 98.3 (09 Oct 2020 00:58)  HR: 84 (09 Oct 2020 02:53) (74 - 84)  BP: 215/115 (09 Oct 2020 02:53) (188/108 - 215/115)  RR: 22 (09 Oct 2020 00:58) (22 - 22)  SpO2: 86% (09 Oct 2020 02:53) (86% - 95%) on 4L O2                                                          LABS:                        13.6   8.04  )-----------( 190      ( 09 Oct 2020 01:49 )             42.0     10-09    136  |  100  |  57.0<H>  ----------------------------<  332<H>  4.5   |  23.0  |  1.83<H>    Ca    8.8      09 Oct 2020 01:49  Mg     2.0     10-09    TPro  6.8  /  Alb  2.8<L>  /  TBili  0.6  /  DBili  x   /  AST  35<H>  /  ALT  19  /  AlkPhos  163<H>  10-09    PT/INR - ( 09 Oct 2020 01:49 )   PT: 11.9 sec;   INR: 1.03 ratio         PTT - ( 09 Oct 2020 01:49 )  PTT:29.7 sec    CARDIAC MARKERS ( 09 Oct 2020 01:49 )  x     / 0.03 ng/mL / x     / x     / x                     Cardiologist: Dr. Camp  Pulmonologist: Dr. Cotto     HPI: 85 year old F with PMH of HTN, CHF, cirrhosis, HLD, DM, AFib (no A/C) presented to ED s/p fall, reported as mechanical fall, tripped and fell, denies loss of consciousness or head injury. Patient stated she had worsening shortness of breath for the past 3 days. Patient was evaluated by her cardiologist Dr. Camp and pulmonologist Dr. Cotto on 08/2020,  CXR with right sided pleural effusion and was recommended to get drained and was scheduled for follow up visit today 10/09.  Patient denies fevers/chills, dizziness, headache, chest pain, palpitation, cough, abd pain/n/v/d, urinary symptoms, recent travel or sick contact.       PAST MEDICAL & SURGICAL HISTORY:  Afib    DM (diabetes mellitus)    CHF (congestive heart failure)    HTN (hypertension)      SOCIAL HISTORY:  Smoker: Denies  ANNA use: Denies  Ilicit Drug use:  Denies  Live with: Daughter    Relevant Family History  FAMILY HISTORY:      Review of Systems  GENERAL:  Fevers[] chills[] sweats[] fatigue[] weight loss[] weight gain []                                      [ x] NEGATIVE  NEURO:  parathesias[] seizures []  syncope []  confusion []                                                               [x ] NEGATIVE                 EYES: glasses[]  blurry vision[]  discharge[] pain[] glaucoma []                                                           [x ] NEGATIVE                 ENMT:  difficulty hearing []  vertigo[]  dysphagia[] epistaxis[] recent dental work []                     [ x] NEGATIVE                 CV:  chest pain[] palpitations[] ANGUIANO [] diaphoresis [] edema[]                                                           [ x] NEGATIVE                                 RESPIRATORY:  wheezing[] SOB[x] cough [] sputum[] hemoptysis[]                                                   [ ] NEGATIVE               GI:  nausea[]  vomiting []  diarrhea[] constipation [] melena []                                                          [ x] NEGATIVE            : hematuria[ ]  dysuria[ ] urgency[] incontinence[]                                                                          [x ] NEGATIVE                    MUSKULOSKELETAL:  arthritis[ ]  joint swelling [ ] muscle weakness [ ]                                            [x ] NEGATIVE                     SKIN/BREAST:  rash[ ] itching [ ]  hair loss[ ] masses[ ]                                                                          [ x] NEGATIVE                     PSYCH:  dementia [ ] depression [ ] anxiety[ ]                                                                                          [x ] NEGATIVE                        HEME/LYMPH:  bruises easily[ ] enlarged lymph nodes[ ] tender lymph nodes[ ]                           [ x] NEGATIVE                      ENDOCRINE:  cold intolerance[ ] heat intolerance[ ] polydipsia[ ]                                                      [ x] NEGATIVE                            PHYSICAL EXAM  General: well developed,                                 Neuro: Awake, alert, oriented x4                 Lungs:  Diminished breath sounds on right, +rales on the right base, +use of accessory muscle.                                                                     CV: Irregular rate, S1, S2   Abdomen:  soft, nontender, nondistended, positive bowel sounds  Extremities:  warm, well perfused, +pitting edema, +DP pulses bilaterally  Psych: Appropriate mood and affect.       Vital Signs Last 24 Hrs  T(C): 36.8 (09 Oct 2020 00:58), Max: 36.8 (09 Oct 2020 00:58)  T(F): 98.3 (09 Oct 2020 00:58), Max: 98.3 (09 Oct 2020 00:58)  HR: 84 (09 Oct 2020 02:53) (74 - 84)  BP: 215/115 (09 Oct 2020 02:53) (188/108 - 215/115)  RR: 22 (09 Oct 2020 00:58) (22 - 22)  SpO2: 86% (09 Oct 2020 02:53) (86% - 95%) on 4L O2                                                          LABS:                        13.6   8.04  )-----------( 190      ( 09 Oct 2020 01:49 )             42.0     10-09    136  |  100  |  57.0<H>  ----------------------------<  332<H>  4.5   |  23.0  |  1.83<H>    Ca    8.8      09 Oct 2020 01:49  Mg     2.0     10-09    TPro  6.8  /  Alb  2.8<L>  /  TBili  0.6  /  DBili  x   /  AST  35<H>  /  ALT  19  /  AlkPhos  163<H>  10-09    PT/INR - ( 09 Oct 2020 01:49 )   PT: 11.9 sec;   INR: 1.03 ratio         PTT - ( 09 Oct 2020 01:49 )  PTT:29.7 sec    CARDIAC MARKERS ( 09 Oct 2020 01:49 )  x     / 0.03 ng/mL / x     / x     / x

## 2020-10-09 NOTE — CONSULT NOTE ADULT - SUBJECTIVE AND OBJECTIVE BOX
PULMONARY CONSULT NOTE      NEO BLANDON-[id number]    Patient is a 85y old  Female who presents with a chief complaint of right Pleural effusion (09 Oct 2020 09:06)      HISTORY OF PRESENT ILLNESS: 85 year old F with PMH of HTN, CHF, cirrhosis with ascites, HLD, DM, AFib (no A/C) presented to ED s/p fall, reported as mechanical fall, tripped and fell, denies loss of consciousness or head injury. Patient stated she had worsening shortness of breath for the past 3 days. Patient was evaluated by her cardiologist Dr. Camp and pulmonologist Dr. Cotto on 08/2020, CXR outpatient which showed right sided pleural effusion and was recommended to get drained and was scheduled for follow up visit today 10/09. In the Ed pt was hypoxic, tachypneic and mild to moderate respiratory distress requiring 4L NC, CXR showed large pleural effusion, given Lasix 80mg IV push, evaluated by ct surgery - R chest tube placed; clamped at 1300 cc; fluid being sent to lab. She has less sob with tube but c/o pain at tube site. Patient denies fevers/chills, dizziness, headache, chest pain, palpitation, cough, abd pain/n/v/d, urinary symptoms, recent travel or sick contact.     Lifelong nonsmoker. Gives no hx of lung dz prior.      MEDICATIONS  (STANDING):  ATENolol  Tablet 50 milliGRAM(s) Oral two times a day  cloNIDine 0.2 milliGRAM(s) Oral two times a day  dextrose 5%. 1000 milliLiter(s) (50 mL/Hr) IV Continuous <Continuous>  dextrose 50% Injectable 12.5 Gram(s) IV Push once  dextrose 50% Injectable 25 Gram(s) IV Push once  dextrose 50% Injectable 25 Gram(s) IV Push once  furosemide   Injectable 40 milliGRAM(s) IV Push two times a day  insulin lispro (HumaLOG) corrective regimen sliding scale   SubCutaneous three times a day before meals  predniSONE   Tablet 2.5 milliGRAM(s) Oral daily  spironolactone 25 milliGRAM(s) Oral daily      MEDICATIONS  (PRN):  dextrose 40% Gel 15 Gram(s) Oral once PRN Blood Glucose LESS THAN 70 milliGRAM(s)/deciliter  glucagon  Injectable 1 milliGRAM(s) IntraMuscular once PRN Glucose LESS THAN 70 milligrams/deciliter      Allergies    penicillin (Other)    Intolerances        PAST MEDICAL & SURGICAL HISTORY:  Ascites    Cirrhosis    DM (diabetes mellitus)    CHF (congestive heart failure)    HTN (hypertension)    S/P hernia repair        FAMILY HISTORY:  No family history of diabetes mellitus        SOCIAL HISTORY  Smoking History: denies    REVIEW OF SYSTEMS:    CONSTITUTIONAL:  per HPI    HEENT:  Eyes:  No diplopia or blurred vision. ENT:  No earache, sore throat or runny nose.    CARDIOVASCULAR:  No pressure, squeezing, tightness, or heaviness about the chest; no palpitations.    RESPIRATORY: per HPI      GASTROINTESTINAL:  No abdominal pain, nausea, vomiting or diarrhea.    GENITOURINARY:  No dysuria, frequency or urgency.    NEUROLOGIC:  No paresthesias, fasciculations, seizures or weakness.    PSYCHIATRIC:  No disorder of thought or mood.    Vital Signs Last 24 Hrs  T(C): 36.4 (09 Oct 2020 09:50), Max: 36.8 (09 Oct 2020 00:58)  T(F): 97.6 (09 Oct 2020 09:50), Max: 98.3 (09 Oct 2020 00:58)  HR: 68 (09 Oct 2020 09:50) (68 - 84)  BP: 169/84 (09 Oct 2020 09:50) (138/75 - 215/115)  BP(mean): --  RR: 22 (09 Oct 2020 07:35) (22 - 22)  SpO2: 100% (09 Oct 2020 09:50) (86% - 100%)    PHYSICAL EXAMINATION:    GENERAL: The patient is  in no apparent distress, frail     HEENT: Head is normocephalic and atraumatic.  Mucous membranes are moist.     NECK: Supple.     LUNGS: decreased on R, chest tube on R, L CTA, respirations unlabored    HEART: Regular rate and rhythm      ABDOMEN: Soft, nontender, and nondistended.  No hepatosplenomegaly is noted.    EXTREMITIES: Without any cyanosis, clubbing, rash, lesions or edema.    NEUROLOGIC: Grossly intact.      LABS:                        13.6   8.04  )-----------( 190      ( 09 Oct 2020 01:49 )             42.0     10-09    136  |  101  |  59.0<H>  ----------------------------<  293<H>  4.7   |  24.0  |  1.81<H>    Ca    8.7      09 Oct 2020 07:34  Mg     2.0     10-09    TPro  6.8  /  Alb  2.8<L>  /  TBili  0.6  /  DBili  x   /  AST  35<H>  /  ALT  19  /  AlkPhos  163<H>  10-09    PT/INR - ( 09 Oct 2020 01:49 )   PT: 11.9 sec;   INR: 1.03 ratio         PTT - ( 09 Oct 2020 01:49 )  PTT:29.7 sec         CARDIAC MARKERS ( 09 Oct 2020 01:49 )  x     / 0.03 ng/mL / x     / x     / x            Serum Pro-Brain Natriuretic Peptide: 4301 pg/mL (10-09-20 @ 01:49)         RADIOLOGY & ADDITIONAL STUDIES:  < from: CT Chest No Cont (10.09.20 @ 06:32) >   EXAM:  CT CHEST                          PROCEDURE DATE:  10/09/2020          INTERPRETATION:  CLINICAL INFORMATION: Shortness of breath and right pleural effusion.    COMPARISON: 3/31/2014    PROCEDURE:  CT of the Chest was performed without intravenous contrast.  Sagittal and coronal reformats were performed.    FINDINGS:    No evidence of mediastinal or left hilar lymphadenopathy. No evidence of a left pleural effusion or pericardial effusion. No evidence of cardiomegaly.    There is a verylarge right pleural effusion occupying almost the entire right hemithorax with almost complete compression atelectasis of the right lung. The mediastinum is shifted to the left. The right hemidiaphragm is inverted.    The left lung is clear. There isminimal aeration of a small portion of the right upper lobe.    Several images obtained below diaphragm demonstrate evidence of cirrhosis and ascites. Cholelithiasis.    Mild diffuse subcutaneous edema involving the chest wall.    Degenerative changes in the spine.    IMPRESSION:  Very large right pleural effusion with shift of the mediastinum to the left. This is consistent with a tension pleural effusion. Complete collapse of the right lung.    Cirrhosis and ascites.    These critical values were discussed by Dr. Sinan Roca with Dr. Samuel on 10/9/2020 8:35 AM with read back.              SINAN ROCA M.D., ATTENDING RADIOLOGIST    < end of copied text >  < from: Xray Chest 1 View-PORTABLE IMMEDIATE (10.09.20 @ 03:21) >     EXAM:  XR CHEST PORTABLE IMMED 1V                          PROCEDURE DATE:  10/09/2020          INTERPRETATION:  EXAM:XR CHEST IMMEDIATE.     CLINICAL INDICATION: sob .     TECHNIQUE: Portable upright AP view.     PRIOR EXAM: 04/02/2014.     FINDINGS:     Evidence of a large right pleural effusion. Only a small portion of the aerated right upper lung is visualized. Visualized left lung is clear. Cardiac silhouette is only partially delineated and magnified. No significant bony abnormality is seen.      IMPRESSION:    Large right pleural effusion. Any additional underlying abnormality cannot be excluded radiographically.                ELADIO BUTLER M.D., ATTENDING RADIOLOGIST  This document has been electronically signed. Oct  9 2020  9:20AM    < end of copied text >

## 2020-10-09 NOTE — ED PROVIDER NOTE - OBJECTIVE STATEMENT
84yo female with pmh of HTN, HLD, DM, CHF, cirrhosis, afib (from review of chart don't see ac, just rate controlled) presents s/p fall and also with sob. PT states today she was going to the bathroom tripped on a chair her daughter left out and fell onto her buttocks, denies head trauma and loc. Pt denies any pain from the fall and denies any preceding symptoms. Pt also states for the past 3 days with sob was seen by Dr. Camp ([facility]) and Dr. Cotto (pul) told she would likely need a drain soon. PT today states sob worsening. Pt denies fevers/chills, ha, loc, focal neuro deficits, cp/palp, cough, abd pain/n/v/d, urinary symptoms, recent travel.

## 2020-10-09 NOTE — CONSULT NOTE ADULT - SUBJECTIVE AND OBJECTIVE BOX
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  She reported worsening dyspnea over the last view days.  She has a known right-sided pleural effusion.  Upon arrival in the ED, she was hypoxic, tachypneic, and in moderate respiratory distress, requiring 4L supplemental oxygen.  She was given furosemide 80 mg IVP and was evaluated by CT surgery who plan to place a pigtail chest tube.  She denies any rectal bleeding, melena, abdominal pain, nausea, or vomiting.    ROS: A 14-point review of systems was completed and was otherwise negative save what was reported in the HPI.    PAST MEDICAL/SURGICAL HISTORY:  Ascites  Cirrhosis  Afib  DM (diabetes mellitus)  CHF (congestive heart failure)  HTN (hypertension)  S/P hernia repair    SOCIAL HISTORY:  - TOBACCO: Denies  - ALCOHOL: Denies  - ILLICIT DRUG USE: Denies    FAMILY HISTORY:  No known history of gastrointestinal or liver disease;  No family history of diabetes mellitus    HOME MEDICATIONS:  atenolol 50 mg oral tablet: 1 tab(s) orally 2 times a day (09 Oct 2020 04:48)  cloNIDine 0.2 mg oral tablet: 1 tab(s) orally 2 times a day (09 Oct 2020 04:48)  furosemide 40 mg oral tablet: 1 tab(s) orally once a day (09 Oct 2020 04:48)  Levemir 100 units/mL subcutaneous solution: 12 unit(s) subcutaneous once a day (at bedtime) (09 Oct 2020 04:48)  predniSONE 2.5 mg oral tablet: 1 tab(s) orally once a day (09 Oct 2020 04:48)  spironolactone 25 mg oral tablet: 1 tab(s) orally once a day (09 Oct 2020 04:48)    INPATIENT MEDICATIONS:  MEDICATIONS  (STANDING):  ATENolol  Tablet 50 milliGRAM(s) Oral two times a day  cloNIDine 0.2 milliGRAM(s) Oral two times a day  dextrose 5%. 1000 milliLiter(s) (50 mL/Hr) IV Continuous <Continuous>  dextrose 50% Injectable 12.5 Gram(s) IV Push once  dextrose 50% Injectable 25 Gram(s) IV Push once  dextrose 50% Injectable 25 Gram(s) IV Push once  furosemide   Injectable 40 milliGRAM(s) IV Push two times a day  insulin lispro (HumaLOG) corrective regimen sliding scale   SubCutaneous three times a day before meals  predniSONE   Tablet 2.5 milliGRAM(s) Oral daily  spironolactone 25 milliGRAM(s) Oral daily    MEDICATIONS  (PRN):  dextrose 40% Gel 15 Gram(s) Oral once PRN Blood Glucose LESS THAN 70 milliGRAM(s)/deciliter  glucagon  Injectable 1 milliGRAM(s) IntraMuscular once PRN Glucose LESS THAN 70 milligrams/deciliter    ALLERGIES:  penicillin (Other)    T(C): 36.6 (10-09-20 @ 07:35), Max: 36.8 (10-09-20 @ 00:58)  HR: 69 (10-09-20 @ 07:35) (69 - 84)  BP: 138/75 (10-09-20 @ 07:35) (138/75 - 215/115)  RR: 22 (10-09-20 @ 07:35) (22 - 22)  SpO2: 99% (10-09-20 @ 07:35) (86% - 99%)      PHYSICAL EXAM:  Constitutional: Chronically ill, elderly  woman in no apparent distress  Eyes: Sclerae anicteric, conjunctivae normal  ENMT: Mucus membranes moist, no oropharyngeal thrush noted  Neck: No thyroid nodules appreciated, no significant cervical or supraclavicular lymphadenopathy  Respiratory: Breathing nonlabored; absent breath sounds on right  Cardiovascular: Regular rate and rhythm  Gastrointestinal: Soft, nontender, mildly distended, normoactive bowel sounds; liver large and nodular; no rebound tenderness or involuntary guarding  Extremities: No clubbing, cyanosis or edema  Neurological: Alert and oriented to person, place and time; no asterixis  Skin: No jaundice  Lymph Nodes: No significant lymphadenopathy  Musculoskeletal: No significant peripheral atrophy  Psychiatric: Affect and mood appropriate      LABS:             13.6   8.04  )-----------( 190      ( 10-09 @ 01:49 )             42.0       PT/INR - ( 09 Oct 2020 01:49 )   PT: 11.9 sec;   INR: 1.03 ratio         PTT - ( 09 Oct 2020 01:49 )  PTT:29.7 sec  10-09    136  |  101  |  59.0<H>  ----------------------------<  293<H>  4.7   |  24.0  |  1.81<H>    Ca    8.7      09 Oct 2020 07:34  Mg     2.0     10-09    TPro  6.8  /  Alb  2.8<L>  /  TBili  0.6  /  DBili  x   /  AST  35<H>  /  ALT  19  /  AlkPhos  163<H>  10-09    LIVER FUNCTIONS - ( 09 Oct 2020 01:49 )  Alb: 2.8 g/dL / Pro: 6.8 g/dL / ALK PHOS: 163 U/L / ALT: 19 U/L / AST: 35 U/L / GGT: x             Lipase, Serum: 44 U/L (10-09-20 @ 01:49)    Urinalysis Basic - ( 09 Oct 2020 06:26 )    Color: Yellow / Appearance: Clear / S.010 / pH: x  Gluc: x / Ketone: Negative  / Bili: Negative / Urobili: Negative mg/dL   Blood: x / Protein: 100 mg/dL / Nitrite: Negative   Leuk Esterase: Negative / RBC: 11-25 /HPF / WBC 3-5   Sq Epi: x / Non Sq Epi: Occasional / Bacteria: Few      IMAGING: I personally reviewed the CT chest, and I agree with the radiologist's interpretation as described below:    < from: CT Chest No Cont (10.09.20 @ 06:32) >  PROCEDURE:  CT of the Chest was performed without intravenous contrast.  Sagittal and coronal reformats were performed.    FINDINGS:    No evidence of mediastinal or left hilar lymphadenopathy. No evidence of a left pleural effusion or pericardial effusion. No evidence of cardiomegaly.    There is a verylarge right pleural effusion occupying almost the entire right hemithorax with almost complete compression atelectasis of the right lung. The mediastinum is shifted to the left. The right hemidiaphragm is inverted.    The left lung is clear. There isminimal aeration of a small portion of the right upper lobe.    Several images obtained below diaphragm demonstrate evidence of cirrhosis and ascites. Cholelithiasis.    Mild diffuse subcutaneous edema involving the chest wall.    Degenerative changes in the spine.    IMPRESSION:  Very large right pleural effusion with shift of the mediastinum to the left. This is consistent with a tension pleural effusion. Complete collapse of the right lung.    Cirrhosis and ascites.    These critical values were discussed by Dr. Sinan Way with Dr. Samuel on 10/9/2020 8:35 AM with read back.    < end of copied text >

## 2020-10-09 NOTE — H&P ADULT - NSHPPHYSICALEXAM_GEN_ALL_CORE
T(C): 36.4 (10-09-20 @ 05:01), Max: 36.8 (10-09-20 @ 00:58)  HR: 76 (10-09-20 @ 05:01) (73 - 84)  BP: 150/77 (10-09-20 @ 05:01) (150/77 - 215/115)  RR: 22 (10-09-20 @ 00:58) (22 - 22)  SpO2: 98% (10-09-20 @ 05:01) (86% - 98%)    GENERAL: patient appears well, no acute distress, appropriate, pleasant  EYES: sclera clear, no exudates  ENMT: oropharynx clear without erythema, no exudates, moist mucous membranes  NECK: supple, soft, no thyromegaly noted  LUNGS: rigth side of lung with decreased breath sounds, no wheezing   HEART: soft S1/S2, regular rate and rhythm, no murmurs noted, + lower extremity edema pitting edema   GASTROINTESTINAL: abdomen is soft, nontender, +distended, normoactive bowel sounds, no palpable masses  INTEGUMENT: good skin turgor, warm skin, appears well perfused  MUSCULOSKELETAL: no clubbing or cyanosis, no obvious deformity  NEUROLOGIC: awake, alert, oriented x3, good muscle tone in 4 extremities, no obvious sensory deficits  PSYCHIATRIC: mood is good, affect is congruent, linear and logical thought process  HEME/LYMPH: no palpable supraclavicular nodules, no obvious ecchymosis or petechiae

## 2020-10-09 NOTE — CONSULT NOTE ADULT - PROBLEM SELECTOR RECOMMENDATION 3
/119, patient denies missing any meds prior to ED visit   Discussed with Dr. Suh in ED  Care as per primary team

## 2020-10-09 NOTE — CONSULT NOTE ADULT - ASSESSMENT
85 year old F with PMH of HTN, CHF, cirrhosis, HLD, DM, AFib (no A/C) presented after a mechanical fall at home, also c/o worsening shortness of breath x 3 days, CXR reveals right sided pleural effusion.

## 2020-10-09 NOTE — ED ADULT NURSE NOTE - OBJECTIVE STATEMENT
pt presents from home. A&ox3 however poor historian pt brought in by EMS from home s/p fall. pt A&Ox3 however poor historian. pt reports going to bathroom and tripping over bathroom chair. pt reports increased SOB x 3 days and is supposed to get "lung drained". spoke with pts daughter who states she called EMS in order to help pt off floor and EMS was not comfortable leaving pt due to work of breathing. daughter Saba states pt was awake, alert s/p fall but shaky. pt with stage 1 pressure sore to sacrum which pt has been seeing MD for. IV placed. awaiting labs at this time.

## 2020-10-09 NOTE — CONSULT NOTE ADULT - PROBLEM SELECTOR RECOMMENDATION 9
CXR reveals large right pleural effusion, patient with mild to moderate resp. distress, tachypneic with accessory muscle use noted  Patient with PMH od CHF, cirrhosis, BNP on examination 4301  Lasix  80 mg was ordered  CT chest ordered (non contrast) BUN/Ctreat on admission (53/1.83)  Will consider pigtail placement post CT chest  Discussed with DR. Best

## 2020-10-09 NOTE — H&P ADULT - NSICDXPASTMEDICALHX_GEN_ALL_CORE_FT
PAST MEDICAL HISTORY:  Afib     Ascites     CHF (congestive heart failure)     Cirrhosis     DM (diabetes mellitus)     HTN (hypertension)

## 2020-10-10 LAB
A1C WITH ESTIMATED AVERAGE GLUCOSE RESULT: 8.5 % — HIGH (ref 4–5.6)
ANION GAP SERPL CALC-SCNC: 11 MMOL/L — SIGNIFICANT CHANGE UP (ref 5–17)
BASOPHILS # BLD AUTO: 0.04 K/UL — SIGNIFICANT CHANGE UP (ref 0–0.2)
BASOPHILS NFR BLD AUTO: 0.4 % — SIGNIFICANT CHANGE UP (ref 0–2)
BUN SERPL-MCNC: 61 MG/DL — HIGH (ref 8–20)
CALCIUM SERPL-MCNC: 8.6 MG/DL — SIGNIFICANT CHANGE UP (ref 8.6–10.2)
CHLORIDE SERPL-SCNC: 101 MMOL/L — SIGNIFICANT CHANGE UP (ref 98–107)
CO2 SERPL-SCNC: 25 MMOL/L — SIGNIFICANT CHANGE UP (ref 22–29)
CREAT SERPL-MCNC: 1.99 MG/DL — HIGH (ref 0.5–1.3)
EOSINOPHIL # BLD AUTO: 0.08 K/UL — SIGNIFICANT CHANGE UP (ref 0–0.5)
EOSINOPHIL NFR BLD AUTO: 0.9 % — SIGNIFICANT CHANGE UP (ref 0–6)
ESTIMATED AVERAGE GLUCOSE: 197 MG/DL — HIGH (ref 68–114)
GLUCOSE BLDC GLUCOMTR-MCNC: 238 MG/DL — HIGH (ref 70–99)
GLUCOSE BLDC GLUCOMTR-MCNC: 259 MG/DL — HIGH (ref 70–99)
GLUCOSE BLDC GLUCOMTR-MCNC: 273 MG/DL — HIGH (ref 70–99)
GLUCOSE BLDC GLUCOMTR-MCNC: 278 MG/DL — HIGH (ref 70–99)
GLUCOSE SERPL-MCNC: 260 MG/DL — HIGH (ref 70–99)
HCT VFR BLD CALC: 37.1 % — SIGNIFICANT CHANGE UP (ref 34.5–45)
HGB BLD-MCNC: 12 G/DL — SIGNIFICANT CHANGE UP (ref 11.5–15.5)
IMM GRANULOCYTES NFR BLD AUTO: 0.2 % — SIGNIFICANT CHANGE UP (ref 0–1.5)
LYMPHOCYTES # BLD AUTO: 1.02 K/UL — SIGNIFICANT CHANGE UP (ref 1–3.3)
LYMPHOCYTES # BLD AUTO: 11.4 % — LOW (ref 13–44)
MAGNESIUM SERPL-MCNC: 1.9 MG/DL — SIGNIFICANT CHANGE UP (ref 1.6–2.6)
MCHC RBC-ENTMCNC: 31.1 PG — SIGNIFICANT CHANGE UP (ref 27–34)
MCHC RBC-ENTMCNC: 32.3 GM/DL — SIGNIFICANT CHANGE UP (ref 32–36)
MCV RBC AUTO: 96.1 FL — SIGNIFICANT CHANGE UP (ref 80–100)
MONOCYTES # BLD AUTO: 0.83 K/UL — SIGNIFICANT CHANGE UP (ref 0–0.9)
MONOCYTES NFR BLD AUTO: 9.3 % — SIGNIFICANT CHANGE UP (ref 2–14)
NEUTROPHILS # BLD AUTO: 6.92 K/UL — SIGNIFICANT CHANGE UP (ref 1.8–7.4)
NEUTROPHILS NFR BLD AUTO: 77.8 % — HIGH (ref 43–77)
PHOSPHATE SERPL-MCNC: 4 MG/DL — SIGNIFICANT CHANGE UP (ref 2.4–4.7)
PLATELET # BLD AUTO: 135 K/UL — LOW (ref 150–400)
POTASSIUM SERPL-MCNC: 5.1 MMOL/L — SIGNIFICANT CHANGE UP (ref 3.5–5.3)
POTASSIUM SERPL-SCNC: 5.1 MMOL/L — SIGNIFICANT CHANGE UP (ref 3.5–5.3)
RBC # BLD: 3.86 M/UL — SIGNIFICANT CHANGE UP (ref 3.8–5.2)
RBC # FLD: 14.1 % — SIGNIFICANT CHANGE UP (ref 10.3–14.5)
SODIUM SERPL-SCNC: 137 MMOL/L — SIGNIFICANT CHANGE UP (ref 135–145)
WBC # BLD: 8.91 K/UL — SIGNIFICANT CHANGE UP (ref 3.8–10.5)
WBC # FLD AUTO: 8.91 K/UL — SIGNIFICANT CHANGE UP (ref 3.8–10.5)

## 2020-10-10 PROCEDURE — 71045 X-RAY EXAM CHEST 1 VIEW: CPT | Mod: 26

## 2020-10-10 PROCEDURE — 99233 SBSQ HOSP IP/OBS HIGH 50: CPT

## 2020-10-10 PROCEDURE — 99222 1ST HOSP IP/OBS MODERATE 55: CPT

## 2020-10-10 PROCEDURE — 99232 SBSQ HOSP IP/OBS MODERATE 35: CPT

## 2020-10-10 PROCEDURE — 99231 SBSQ HOSP IP/OBS SF/LOW 25: CPT

## 2020-10-10 RX ORDER — INSULIN GLARGINE 100 [IU]/ML
10 INJECTION, SOLUTION SUBCUTANEOUS AT BEDTIME
Refills: 0 | Status: DISCONTINUED | OUTPATIENT
Start: 2020-10-10 | End: 2020-10-11

## 2020-10-10 RX ORDER — ALBUMIN HUMAN 25 %
50 VIAL (ML) INTRAVENOUS EVERY 8 HOURS
Refills: 0 | Status: DISCONTINUED | OUTPATIENT
Start: 2020-10-10 | End: 2020-10-11

## 2020-10-10 RX ORDER — HEPARIN SODIUM 5000 [USP'U]/ML
5000 INJECTION INTRAVENOUS; SUBCUTANEOUS EVERY 12 HOURS
Refills: 0 | Status: DISCONTINUED | OUTPATIENT
Start: 2020-10-10 | End: 2020-10-20

## 2020-10-10 RX ADMIN — Medication 2.5 MILLIGRAM(S): at 06:13

## 2020-10-10 RX ADMIN — Medication 3: at 16:52

## 2020-10-10 RX ADMIN — Medication 650 MILLIGRAM(S): at 21:17

## 2020-10-10 RX ADMIN — INSULIN GLARGINE 10 UNIT(S): 100 INJECTION, SOLUTION SUBCUTANEOUS at 22:32

## 2020-10-10 RX ADMIN — Medication 0.2 MILLIGRAM(S): at 06:13

## 2020-10-10 RX ADMIN — Medication 40 MILLIGRAM(S): at 17:21

## 2020-10-10 RX ADMIN — Medication 3: at 08:22

## 2020-10-10 RX ADMIN — Medication 3: at 11:30

## 2020-10-10 RX ADMIN — OXYCODONE AND ACETAMINOPHEN 1 TABLET(S): 5; 325 TABLET ORAL at 01:29

## 2020-10-10 RX ADMIN — ATENOLOL 50 MILLIGRAM(S): 25 TABLET ORAL at 17:21

## 2020-10-10 RX ADMIN — ATENOLOL 50 MILLIGRAM(S): 25 TABLET ORAL at 06:13

## 2020-10-10 RX ADMIN — SPIRONOLACTONE 25 MILLIGRAM(S): 25 TABLET, FILM COATED ORAL at 06:13

## 2020-10-10 RX ADMIN — LIDOCAINE 1 PATCH: 4 CREAM TOPICAL at 11:29

## 2020-10-10 RX ADMIN — Medication 0.2 MILLIGRAM(S): at 17:20

## 2020-10-10 RX ADMIN — Medication 50 MILLILITER(S): at 21:18

## 2020-10-10 RX ADMIN — HEPARIN SODIUM 5000 UNIT(S): 5000 INJECTION INTRAVENOUS; SUBCUTANEOUS at 17:21

## 2020-10-10 RX ADMIN — LIDOCAINE 1 PATCH: 4 CREAM TOPICAL at 20:03

## 2020-10-10 RX ADMIN — Medication 40 MILLIGRAM(S): at 06:13

## 2020-10-10 NOTE — PROGRESS NOTE ADULT - ASSESSMENT
85 year old F with PMH of HTN, CHF, cirrhosis with ascites, HLD, DM, AFib (no A/C) presented to ED s/p fall, reported as mechanical fall, tripped and fell, denies loss of consciousness or head injury. Patient stated she had worsening shortness of breath for the past 3 days.  Acute hypoxic respiratory failure due to right pleural effusion  -CXR showed large right side pleural effusion likely multifactorial in the setting of cirrhosis with ascites and chf   - S/p CT, follow up cytology results  - CTS, Pulm following  - Supplemental oxygen via NC prn  - Daily CXR    Volume overload 2/2 to cirrhosis  - Cardiology consulted, recs appreciated.  - GI consulted, recs apprecitated  - Strict I&Os, daily weights, fluid/salt restriction.  - Lasix 40mg IV BID  - C/w Spirinolactone 25mg daily  - Telemetry monitoring  - Patient may require paracentesis, will await GI recs    DEVYN  -likely on ckd, unknown baseline cr   -monitor renal function closely while on diuretics   -avoid nephrotoxic meds     Hypertensive urgency, resolved  -/115 likely due to missed medications   -s/p hydrazine 10mg IV push BP improved to 151/77  -cont with Lasix 40mg IV push, spironolactone 25mg and clonidine        IDDM  -cont with lantus 12units at bedtime  -ISS, hypoglycemic protocol, FSG     Arthritis   -cont with prednisone     DVT ppx   - Heparin sq    Dispo: Pending clinical course.    Family called, no answer.   85 year old F with PMH of HTN, CHF, cirrhosis with ascites, HLD, DM, AFib (no A/C) presented to ED s/p fall, reported as mechanical fall, tripped and fell, denies loss of consciousness or head injury. Patient stated she had worsening shortness of breath for the past 3 days.  Acute hypoxic respiratory failure due to right pleural effusion  -CXR showed large right side pleural effusion  - S/p chest tube, follow up cytology results  - CTS, Pulm following  - Supplemental oxygen via NC prn  - Daily CXR  - CXR today read as moderate PTX of R lung, CTS aware, trapped lung per CTS.     Volume overload 2/2 to cirrhosis  - Cardiology consulted, recs appreciated.  - GI consulted, recs apprecitated  - Strict I&Os, daily weights, fluid/salt restriction.  - Lasix 40mg IV BID  - C/w Spirinolactone 25mg daily  - Telemetry monitoring  - Patient may require paracentesis, will await GI recs    DEVYN vs CKD  -likely on ckd, unknown baseline cr   -monitor renal function closely while on diuretics   -avoid nephrotoxic meds     Hypertensive urgency, resolved  -/115 likely due to missed medications   -s/p hydrazine 10mg IV push BP improved to 151/77  -cont with Lasix 40mg IV push, spironolactone 25mg and clonidine        IDDM  -cont with lantus 12units at bedtime  -ISS, hypoglycemic protocol, FSG     Arthritis   -cont with prednisone     Severe protein calorie malnutrition  - Supplementation  DVT ppx   - Heparin sq  Dispo: Pending clinical course (GI/CTS)    Family called, no answer.

## 2020-10-10 NOTE — PROGRESS NOTE ADULT - ASSESSMENT
Overall, I am concerned that a therapeutic paracentesis may worsen her renal function and propel her into irreversible hepatorenal syndrome.  However, if we do not address her ascites, the pleural effusion, which is likely a hepatic hydrothorax, will reaccumulate.  Her renal function is tenuous and prior to subjecting her to another significant fluid shift, I will give her 48-72 hours of salt poor albumin to improve her intravascular volume and give her a better chance of a good outcome.

## 2020-10-10 NOTE — PROGRESS NOTE ADULT - SUBJECTIVE AND OBJECTIVE BOX
Chief Complaint: This is an 85y old woman patient being seen in follow-up consultation for cirrhosis.    HPI / 24H events:  Patient had chest tube placed yesterday.  Reports feeling the best she has felt in months.  Appetite improved.      ROS: A 14-point review of systems was reviewed and was otherwise negative save what was reported in the HPI.    PAST MEDICAL/SURGICAL HISTORY:  Ascites  Cirrhosis  DM (diabetes mellitus)  CHF (congestive heart failure)  HTN (hypertension)  S/P hernia repair    MEDICATIONS  (STANDING):  albumin human 25% IVPB 50 milliLiter(s) IV Intermittent every 8 hours  ATENolol  Tablet 50 milliGRAM(s) Oral two times a day  cloNIDine 0.2 milliGRAM(s) Oral two times a day  dextrose 5%. 1000 milliLiter(s) (50 mL/Hr) IV Continuous <Continuous>  dextrose 50% Injectable 12.5 Gram(s) IV Push once  dextrose 50% Injectable 25 Gram(s) IV Push once  dextrose 50% Injectable 25 Gram(s) IV Push once  furosemide   Injectable 40 milliGRAM(s) IV Push two times a day  heparin   Injectable 5000 Unit(s) SubCutaneous every 12 hours  insulin glargine Injectable (LANTUS) 10 Unit(s) SubCutaneous at bedtime  insulin lispro (HumaLOG) corrective regimen sliding scale   SubCutaneous three times a day before meals  lidocaine   Patch 1 Patch Transdermal daily  predniSONE   Tablet 2.5 milliGRAM(s) Oral daily  spironolactone 25 milliGRAM(s) Oral daily    MEDICATIONS  (PRN):  acetaminophen   Tablet .. 650 milliGRAM(s) Oral every 6 hours PRN Temp greater or equal to 38C (100.4F), Mild Pain (1 - 3)  dextrose 40% Gel 15 Gram(s) Oral once PRN Blood Glucose LESS THAN 70 milliGRAM(s)/deciliter  glucagon  Injectable 1 milliGRAM(s) IntraMuscular once PRN Glucose LESS THAN 70 milligrams/deciliter  oxycodone    5 mG/acetaminophen 325 mG 1 Tablet(s) Oral every 4 hours PRN Moderate Pain (4 - 6)    penicillin (Other)    T(C): 36.6 (10-10-20 @ 11:30), Max: 36.6 (10-10-20 @ 11:30)  HR: 60 (10-10-20 @ 11:30) (56 - 76)  BP: 102/61 (10-10-20 @ 11:30) (102/61 - 184/68)  RR: 18 (10-10-20 @ 11:30) (18 - 19)  SpO2: 97% (10-10-20 @ 11:30) (94% - 99%)    I&O's Summary    09 Oct 2020 07:01  -  10 Oct 2020 07:00  --------------------------------------------------------  IN: 0 mL / OUT: 5440 mL / NET: -5440 mL    10 Oct 2020 07:01  -  10 Oct 2020 15:36  --------------------------------------------------------  IN: 0 mL / OUT: 500 mL / NET: -500 mL    PHYSICAL EXAM:  Constitutional: Chronically ill, elderly  woman in no apparent distress  Eyes: Sclerae anicteric, conjunctivae normal  ENMT: Mucus membranes moist, no oropharyngeal thrush noted  Neck: No thyroid nodules appreciated, no significant cervical or supraclavicular lymphadenopathy  Respiratory: Breathing nonlabored  Cardiovascular: Regular rate and rhythm  Gastrointestinal: Soft, nontender, distended, normoactive bowel sounds; no hepatosplenomegaly appreciated; no rebound tenderness or involuntary guarding  Extremities: No clubbing, cyanosis or edema  Neurological: Alert and oriented to person, place and time; no asterixis  Skin: No jaundice  Lymph Nodes: No significant lymphadenopathy  Musculoskeletal: No significant peripheral atrophy  Psychiatric: Affect and mood appropriate                   12.0   8.91  )-----------( 135      ( 10-10 @ 03:17 )             37.1                13.6   8.04  )-----------( 190      ( 10-09 @ 01:49 )             42.0       10-10    137  |  101  |  61.0<H>  ----------------------------<  260<H>  5.1   |  25.0  |  1.99<H>    Ca    8.6      10 Oct 2020 03:17  Phos  4.0     10-10  Mg     1.9     10-10    TPro  6.8  /  Alb  2.8<L>  /  TBili  0.6  /  DBili  x   /  AST  35<H>  /  ALT  19  /  AlkPhos  163<H>  10-09    LIVER FUNCTIONS - ( 09 Oct 2020 01:49 )  Alb: 2.8 g/dL / Pro: 6.8 g/dL / ALK PHOS: 163 U/L / ALT: 19 U/L / AST: 35 U/L / GGT: x             Lipase, Serum: 44 U/L (10-09-20 @ 01:49)    PT/INR - ( 09 Oct 2020 01:49 )   PT: 11.9 sec;   INR: 1.03 ratio         PTT - ( 09 Oct 2020 01:49 )  PTT:29.7 sec    Culture - Body Fluid with Gram Stain (collected 09 Oct 2020 16:33)  Source: .Body Fluid Pleural Fluid  Gram Stain (09 Oct 2020 19:22):    No polymorphonuclear cells seen    No organisms seen    by cytocentrifuge  Preliminary Report (10 Oct 2020 12:47):    No growth

## 2020-10-10 NOTE — PROGRESS NOTE ADULT - SUBJECTIVE AND OBJECTIVE BOX
[facility]                                   [address] [place] [address]                                                      PHONE: [phone]                                                         FAX: [phone]  [email or web address]  ---------------------------------------------------------------------------------------------------------------------------------    Overnight events/patient complaints: s/p chest tube placement     INTERPRETATION OF TELEMETRY (personally reviewed):    ECG: < from: 12 Lead ECG (10.09.20 @ 01:07) >  Normal sinus rhythm with multiple and consecutive APC's  Anteroseptal infarct , age undetermined  Abnormal ECG    ECHO FINDINGS: 8/20/2020 LVEF normal 60% normal RV size and contractility mild MAC mild MR aortic sclerosis without stenosis moderate TR estimated RVSP 45 mmHg, minimal pericardial effusion anterior to the right atrium, large right pleural effusion    I&O's Summary    09 Oct 2020 07:01  -  10 Oct 2020 07:00  --------------------------------------------------------  IN: 0 mL / OUT: 5440 mL / NET: -5440 mL        PAST MEDICAL & SURGICAL HISTORY:  Ascites  Cirrhosis  DM (diabetes mellitus)  CHF (congestive heart failure)  HTN (hypertension)  S/P hernia repair    penicillin (Other)    MEDICATIONS  (STANDING):  ATENolol  Tablet 50 milliGRAM(s) Oral two times a day  cloNIDine 0.2 milliGRAM(s) Oral two times a day  dextrose 5%. 1000 milliLiter(s) (50 mL/Hr) IV Continuous <Continuous>  dextrose 50% Injectable 12.5 Gram(s) IV Push once  dextrose 50% Injectable 25 Gram(s) IV Push once  dextrose 50% Injectable 25 Gram(s) IV Push once  furosemide   Injectable 40 milliGRAM(s) IV Push two times a day  insulin lispro (HumaLOG) corrective regimen sliding scale   SubCutaneous three times a day before meals  lidocaine   Patch 1 Patch Transdermal daily  predniSONE   Tablet 2.5 milliGRAM(s) Oral daily  spironolactone 25 milliGRAM(s) Oral daily    MEDICATIONS  (PRN):  acetaminophen   Tablet .. 650 milliGRAM(s) Oral every 6 hours PRN Temp greater or equal to 38C (100.4F), Mild Pain (1 - 3)  dextrose 40% Gel 15 Gram(s) Oral once PRN Blood Glucose LESS THAN 70 milliGRAM(s)/deciliter  glucagon  Injectable 1 milliGRAM(s) IntraMuscular once PRN Glucose LESS THAN 70 milligrams/deciliter  oxycodone    5 mG/acetaminophen 325 mG 1 Tablet(s) Oral every 4 hours PRN Moderate Pain (4 - 6)      Vital Signs Last 24 Hrs  T(C): 36.4 (10 Oct 2020 07:16), Max: 36.5 (10 Oct 2020 01:27)  T(F): 97.5 (10 Oct 2020 07:16), Max: 97.7 (10 Oct 2020 01:27)  HR: 76 (10 Oct 2020 07:16) (56 - 76)  BP: 163/90 (10 Oct 2020 07:16) (85/60 - 184/68)  BP(mean): --  RR: 18 (10 Oct 2020 07:16) (18 - 19)  SpO2: 94% (10 Oct 2020 07:16) (94% - 100%)  ICU Vital Signs Last 24 Hrs    PHYSICAL EXAM:  General: Appears well developed, well nourished alert and cooperative.  HEENT: Head; normocephalic, atraumatic.Pupils reactive, cornea wnl. Neck supple, no nodes adenopathy, no JVD  CARDIOVASCULAR: Normal S1 and S2, 1/6 MISTI, no rub, gallop or lift.   LUNGS: decreased breath sounds R>L  ABDOMEN: Soft, nontender without mass or organomegaly. bowel sounds normoactive.  EXTREMITIES: No clubbing, cyanosis, (+) edema.   SKIN: warm and dry with normal turgor.  NEURO: Alert/oriented x 3/normal motor exam.   PSYCH: normal affect.        LABS:                        12.0   8.91  )-----------( 135      ( 10 Oct 2020 03:17 )             37.1     10-10    137  |  101  |  61.0<H>  ----------------------------<  260<H>  5.1   |  25.0  |  1.99<H>    Ca    8.6      10 Oct 2020 03:17  Phos  4.0     10-10  Mg     1.9     10-10    TPro  6.8  /  Alb  2.8<L>  /  TBili  0.6  /  DBili  x   /  AST  35<H>  /  ALT  19  /  AlkPhos  163<H>  10-09    BHAVIK BLANDON  CARDIAC MARKERS ( 09 Oct 2020 01:49 )  x     / 0.03 ng/mL / x     / x     / x          PT/INR - ( 09 Oct 2020 01:49 )   PT: 11.9 sec;   INR: 1.03 ratio         PTT - ( 09 Oct 2020 01:49 )  PTT:29.7 sec      RADIOLOGY & ADDITIONAL STUDIES:    ASSESSMENT AND PLAN:  Ms Blandon is an 84 y/o woman with hyperlipidemia, type 2 diabetes mellitus, psoriatic arthritis and rheumatoid arthritis, pulmonary hypertension, cirrhosis of liver, chronic large right pleural effusion presumed to be related to her cirrhosis who presents with fall and concerns related to progressively worsening shortness of breath 3 days duration.   CT chest with large right pleural effusion with left shift with complete collapse of the right lung     Dyspnea: likely related to her large pleural effusion/cirrhosis   - s/p thoracentesis/chest tube placement given her tension pleural effusion    - recent echocardiogram with normal biventricular function without significant valvular dysfunction   - continue spironolactone 25mg   - continue IV lasix with strict I/O; hypervolemic secondary to cirrhosis  - goal K>4 and Mg>2   - telemetry monitoring     Thank you for allowing [facility] to participate in the care of this patient.  Please feel free to call with any questions or concerns. [facility], [facility]                                   [address] [place] [address]                                                      PHONE: [phone]                                                         FAX: [phone]  [email or web address]  ---------------------------------------------------------------------------------------------------------------------------------    Overnight events/patient complaints: s/p chest tube placement, reports feeling markedly improved and presently only concern relates to discomfort at chest tube site     INTERPRETATION OF TELEMETRY (personally reviewed):    ECG: < from: 12 Lead ECG (10.09.20 @ 01:07) >  Normal sinus rhythm with multiple and consecutive APC's  Anteroseptal infarct , age undetermined  Abnormal ECG    ECHO FINDINGS: 8/20/2020 LVEF normal 60% normal RV size and contractility mild MAC mild MR aortic sclerosis without stenosis moderate TR estimated RVSP 45 mmHg, minimal pericardial effusion anterior to the right atrium, large right pleural effusion    I&O's Summary    09 Oct 2020 07:01  -  10 Oct 2020 07:00  --------------------------------------------------------  IN: 0 mL / OUT: 5440 mL / NET: -5440 mL        PAST MEDICAL & SURGICAL HISTORY:  Ascites  Cirrhosis  DM (diabetes mellitus)  CHF (congestive heart failure)  HTN (hypertension)  S/P hernia repair    penicillin (Other)    MEDICATIONS  (STANDING):  ATENolol  Tablet 50 milliGRAM(s) Oral two times a day  cloNIDine 0.2 milliGRAM(s) Oral two times a day  dextrose 5%. 1000 milliLiter(s) (50 mL/Hr) IV Continuous <Continuous>  dextrose 50% Injectable 12.5 Gram(s) IV Push once  dextrose 50% Injectable 25 Gram(s) IV Push once  dextrose 50% Injectable 25 Gram(s) IV Push once  furosemide   Injectable 40 milliGRAM(s) IV Push two times a day  insulin lispro (HumaLOG) corrective regimen sliding scale   SubCutaneous three times a day before meals  lidocaine   Patch 1 Patch Transdermal daily  predniSONE   Tablet 2.5 milliGRAM(s) Oral daily  spironolactone 25 milliGRAM(s) Oral daily    MEDICATIONS  (PRN):  acetaminophen   Tablet .. 650 milliGRAM(s) Oral every 6 hours PRN Temp greater or equal to 38C (100.4F), Mild Pain (1 - 3)  dextrose 40% Gel 15 Gram(s) Oral once PRN Blood Glucose LESS THAN 70 milliGRAM(s)/deciliter  glucagon  Injectable 1 milliGRAM(s) IntraMuscular once PRN Glucose LESS THAN 70 milligrams/deciliter  oxycodone    5 mG/acetaminophen 325 mG 1 Tablet(s) Oral every 4 hours PRN Moderate Pain (4 - 6)      Vital Signs Last 24 Hrs  T(C): 36.4 (10 Oct 2020 07:16), Max: 36.5 (10 Oct 2020 01:27)  T(F): 97.5 (10 Oct 2020 07:16), Max: 97.7 (10 Oct 2020 01:27)  HR: 76 (10 Oct 2020 07:16) (56 - 76)  BP: 163/90 (10 Oct 2020 07:16) (85/60 - 184/68)  BP(mean): --  RR: 18 (10 Oct 2020 07:16) (18 - 19)  SpO2: 94% (10 Oct 2020 07:16) (94% - 100%)  ICU Vital Signs Last 24 Hrs    PHYSICAL EXAM:  General: Appears well developed, well nourished alert and cooperative.  HEENT: Head; normocephalic, atraumatic.Pupils reactive, cornea wnl. Neck supple, no nodes adenopathy, no JVD  CARDIOVASCULAR: Normal S1 and S2, 1/6 MISTI, no rub, gallop or lift.   LUNGS: decreased breath sounds R>L, improved aeration on the right   ABDOMEN: Soft, nontender without mass or organomegaly. bowel sounds normoactive.  EXTREMITIES: No clubbing, cyanosis, (+) edema.   SKIN: warm and dry with normal turgor.  NEURO: Alert/oriented x 3/normal motor exam.   PSYCH: normal affect.        LABS:                        12.0   8.91  )-----------( 135      ( 10 Oct 2020 03:17 )             37.1     10-10    137  |  101  |  61.0<H>  ----------------------------<  260<H>  5.1   |  25.0  |  1.99<H>    Ca    8.6      10 Oct 2020 03:17  Phos  4.0     10-10  Mg     1.9     10-10    TPro  6.8  /  Alb  2.8<L>  /  TBili  0.6  /  DBili  x   /  AST  35<H>  /  ALT  19  /  AlkPhos  163<H>  10-09    BHAVIK BLANDON  CARDIAC MARKERS ( 09 Oct 2020 01:49 )  x     / 0.03 ng/mL / x     / x     / x          PT/INR - ( 09 Oct 2020 01:49 )   PT: 11.9 sec;   INR: 1.03 ratio         PTT - ( 09 Oct 2020 01:49 )  PTT:29.7 sec      RADIOLOGY & ADDITIONAL STUDIES:    ASSESSMENT AND PLAN:  Ms Blandon is an 84 y/o woman with hyperlipidemia, type 2 diabetes mellitus, psoriatic arthritis and rheumatoid arthritis, pulmonary hypertension, cirrhosis of liver, chronic large right pleural effusion presumed to be related to her cirrhosis who presents with fall and concerns related to progressively worsening shortness of breath 3 days duration.   CT chest with large right pleural effusion with left shift with complete collapse of the right lung     Dyspnea: likely related to her large pleural effusion/cirrhosis   - s/p thoracentesis/chest tube placement given her tension pleural effusion    - recent echocardiogram with normal biventricular function without significant valvular dysfunction   - continue spironolactone 25mg   - continue IV lasix with strict I/O; hypervolemic secondary to cirrhosis  - goal K>4 and Mg>2   - telemetry monitoring     Thank you for allowing [facility] to participate in the care of this patient.  Please feel free to call with any questions or concerns.

## 2020-10-10 NOTE — PROGRESS NOTE ADULT - SUBJECTIVE AND OBJECTIVE BOX
85y Female s/p     Subjective:  No complaints.  Feels best she has in a couple weeks.  Nausea has resolved.      T(C): 36.6 (10-10-20 @ 11:30), Max: 36.6 (10-10-20 @ 11:30)  HR: 60 (10-10-20 @ 11:30) (56 - 76)  BP: 102/61 (10-10-20 @ 11:30) (102/61 - 184/68)  ABP: --  ABP(mean): --  RR: 18 (10-10-20 @ 11:30) (18 - 19)  SpO2: 97% (10-10-20 @ 11:30) (94% - 99%)  Wt(kg): --  CVP(mm Hg): --  CO: --  CI: --  PA: --         10-10    137  |  101  |  61.0<H>  ----------------------------<  260<H>  5.1   |  25.0  |  1.99<H>    Ca    8.6      10 Oct 2020 03:17  Phos  4.0     10-10  Mg     1.9     10-10    TPro  6.8  /  Alb  2.8<L>  /  TBili  0.6  /  DBili  x   /  AST  35<H>  /  ALT  19  /  AlkPhos  163<H>  10-09                               12.0   8.91  )-----------( 135      ( 10 Oct 2020 03:17 )             37.1        PT/INR - ( 09 Oct 2020 01:49 )   PT: 11.9 sec;   INR: 1.03 ratio         PTT - ( 09 Oct 2020 01:49 )  PTT:29.7 sec             CAPILLARY BLOOD GLUCOSE      POCT Blood Glucose.: 273 mg/dL (10 Oct 2020 11:27)  POCT Blood Glucose.: 259 mg/dL (10 Oct 2020 08:12)  POCT Blood Glucose.: 221 mg/dL (09 Oct 2020 16:16)           CXR:    I&O's Detail    09 Oct 2020 07:01  -  10 Oct 2020 07:00  --------------------------------------------------------  IN:  Total IN: 0 mL    OUT:    Chest Tube (mL): 5440 mL  Total OUT: 5440 mL    Total NET: -5440 mL      10 Oct 2020 07:01  -  10 Oct 2020 15:12  --------------------------------------------------------  IN:  Total IN: 0 mL    OUT:    Chest Tube (mL): 500 mL  Total OUT: 500 mL    Total NET: -500 mL          MEDICATIONS  (STANDING):  albumin human 25% IVPB 50 milliLiter(s) IV Intermittent every 8 hours  ATENolol  Tablet 50 milliGRAM(s) Oral two times a day  cloNIDine 0.2 milliGRAM(s) Oral two times a day  dextrose 5%. 1000 milliLiter(s) (50 mL/Hr) IV Continuous <Continuous>  dextrose 50% Injectable 12.5 Gram(s) IV Push once  dextrose 50% Injectable 25 Gram(s) IV Push once  dextrose 50% Injectable 25 Gram(s) IV Push once  furosemide   Injectable 40 milliGRAM(s) IV Push two times a day  heparin   Injectable 5000 Unit(s) SubCutaneous every 12 hours  insulin glargine Injectable (LANTUS) 10 Unit(s) SubCutaneous at bedtime  insulin lispro (HumaLOG) corrective regimen sliding scale   SubCutaneous three times a day before meals  lidocaine   Patch 1 Patch Transdermal daily  predniSONE   Tablet 2.5 milliGRAM(s) Oral daily  spironolactone 25 milliGRAM(s) Oral daily    MEDICATIONS  (PRN):  acetaminophen   Tablet .. 650 milliGRAM(s) Oral every 6 hours PRN Temp greater or equal to 38C (100.4F), Mild Pain (1 - 3)  dextrose 40% Gel 15 Gram(s) Oral once PRN Blood Glucose LESS THAN 70 milliGRAM(s)/deciliter  glucagon  Injectable 1 milliGRAM(s) IntraMuscular once PRN Glucose LESS THAN 70 milligrams/deciliter  oxycodone    5 mG/acetaminophen 325 mG 1 Tablet(s) Oral every 4 hours PRN Moderate Pain (4 - 6)      Physical Exam    Pulm: CTA, slightly decreased on right. Chest tube in place and functioning properly, tidaling, minimal air leak.      -----------------------------------------------------------------------------------------------------------------------------------------------------------------------------  -----------------------------------------------------------------------------------------------------------------------------------------------------------------------------

## 2020-10-10 NOTE — PROGRESS NOTE ADULT - SUBJECTIVE AND OBJECTIVE BOX
[facility] Division of Hospital Medicine  Harlan Busch [phone]    Chief Complaint:  Patient is a 85y old  Female who presents with a chief complaint of right Pleural effusion (10 Oct 2020 13:17)      SUBJECTIVE / OVERNIGHT EVENTS:  Patient seen and examined at bedside. CT connected to water seal. On room air, saturating 97% in no acute distress. Reports feeling better.  No new complaints.    Patient denies chest pain, SOB, abd pain, N/V, fever, chills, dysuria or any other complaints. All remainder ROS negative.     MEDICATIONS  (STANDING):  ATENolol  Tablet 50 milliGRAM(s) Oral two times a day  cloNIDine 0.2 milliGRAM(s) Oral two times a day  dextrose 5%. 1000 milliLiter(s) (50 mL/Hr) IV Continuous <Continuous>  dextrose 50% Injectable 12.5 Gram(s) IV Push once  dextrose 50% Injectable 25 Gram(s) IV Push once  dextrose 50% Injectable 25 Gram(s) IV Push once  furosemide   Injectable 40 milliGRAM(s) IV Push two times a day  heparin   Injectable 5000 Unit(s) SubCutaneous every 12 hours  insulin glargine Injectable (LANTUS) 10 Unit(s) SubCutaneous at bedtime  insulin lispro (HumaLOG) corrective regimen sliding scale   SubCutaneous three times a day before meals  lidocaine   Patch 1 Patch Transdermal daily  predniSONE   Tablet 2.5 milliGRAM(s) Oral daily  spironolactone 25 milliGRAM(s) Oral daily    MEDICATIONS  (PRN):  acetaminophen   Tablet .. 650 milliGRAM(s) Oral every 6 hours PRN Temp greater or equal to 38C (100.4F), Mild Pain (1 - 3)  dextrose 40% Gel 15 Gram(s) Oral once PRN Blood Glucose LESS THAN 70 milliGRAM(s)/deciliter  glucagon  Injectable 1 milliGRAM(s) IntraMuscular once PRN Glucose LESS THAN 70 milligrams/deciliter  oxycodone    5 mG/acetaminophen 325 mG 1 Tablet(s) Oral every 4 hours PRN Moderate Pain (4 - 6)        I&O's Summary    09 Oct 2020 07:01  -  10 Oct 2020 07:00  --------------------------------------------------------  IN: 0 mL / OUT: 5440 mL / NET: -5440 mL    10 Oct 2020 07:01  -  10 Oct 2020 14:09  --------------------------------------------------------  IN: 0 mL / OUT: 500 mL / NET: -500 mL        PHYSICAL EXAM:  Vital Signs Last 24 Hrs  T(C): 36.6 (10 Oct 2020 11:30), Max: 36.6 (10 Oct 2020 11:30)  T(F): 97.9 (10 Oct 2020 11:30), Max: 97.9 (10 Oct 2020 11:30)  HR: 60 (10 Oct 2020 11:30) (56 - 76)  BP: 102/61 (10 Oct 2020 11:30) (102/61 - 184/68)  BP(mean): --  RR: 18 (10 Oct 2020 11:30) (18 - 19)  SpO2: 97% (10 Oct 2020 11:30) (94% - 99%)        CONSTITUTIONAL: NAD, well-developed, well-groomed  HEENT: NC/AT, PERRL, no JVD  RESPIRATORY: CTA bilaterally, normal effort  CARDIOVASCULAR: RRR, S1/S2+, no m/g/r  ABDOMEN: Nontender to palpation, normoactive bowel sounds, no rebound/guarding; No hepatosplenomegaly  MUSCLOSKELETAL: No edema, cyanosis or deformities.  PSYCH: A+O to person, place, and time; affect appropriate  NEUROLOGY: CN 2-12 are intact and symmetric; no gross neurological deficits.  SKIN: No rashes; no palpable lesions  VASC: Distal pulses palpable    LABS:                        12.0   8.91  )-----------( 135      ( 10 Oct 2020 03:17 )             37.1     10-10    137  |  101  |  61.0<H>  ----------------------------<  260<H>  5.1   |  25.0  |  1.99<H>    Ca    8.6      10 Oct 2020 03:17  Phos  4.0     10-10  Mg     1.9     10-10    TPro  6.8  /  Alb  2.8<L>  /  TBili  0.6  /  DBili  x   /  AST  35<H>  /  ALT  19  /  AlkPhos  163<H>  10-09    PT/INR - ( 09 Oct 2020 01:49 )   PT: 11.9 sec;   INR: 1.03 ratio         PTT - ( 09 Oct 2020 01:49 )  PTT:29.7 sec  CARDIAC MARKERS ( 09 Oct 2020 01:49 )  x     / 0.03 ng/mL / x     / x     / x          Urinalysis Basic - ( 09 Oct 2020 06:26 )    Color: Yellow / Appearance: Clear / S.010 / pH: x  Gluc: x / Ketone: Negative  / Bili: Negative / Urobili: Negative mg/dL   Blood: x / Protein: 100 mg/dL / Nitrite: Negative   Leuk Esterase: Negative / RBC: 11-25 /HPF / WBC 3-5   Sq Epi: x / Non Sq Epi: Occasional / Bacteria: Few        Culture - Body Fluid with Gram Stain (collected 09 Oct 2020 16:33)  Source: .Body Fluid Pleural Fluid  Gram Stain (09 Oct 2020 19:22):    No polymorphonuclear cells seen    No organisms seen    by cytocentrifuge  Preliminary Report (10 Oct 2020 12:47):    No growth      CAPILLARY BLOOD GLUCOSE      POCT Blood Glucose.: 273 mg/dL (10 Oct 2020 11:27)  POCT Blood Glucose.: 259 mg/dL (10 Oct 2020 08:12)  POCT Blood Glucose.: 221 mg/dL (09 Oct 2020 16:16)        RADIOLOGY & ADDITIONAL TESTS:  Results Reviewed:   Imaging Personally Reviewed:  Electrocardiogram Personally Reviewed:                                           [facility] Division of Hospital Medicine  Harlan Busch [phone]    Chief Complaint:  Patient is a 85y old  Female who presents with a chief complaint of right Pleural effusion (10 Oct 2020 13:17)      SUBJECTIVE / OVERNIGHT EVENTS:  Patient seen and examined at bedside. CT connected to water seal. On room air, saturating 97% in no acute distress. Reports feeling better.  No new complaints.    Patient denies chest pain, SOB, abd pain, N/V, fever, chills, dysuria or any other complaints. All remainder ROS negative.     MEDICATIONS  (STANDING):  ATENolol  Tablet 50 milliGRAM(s) Oral two times a day  cloNIDine 0.2 milliGRAM(s) Oral two times a day  dextrose 5%. 1000 milliLiter(s) (50 mL/Hr) IV Continuous <Continuous>  dextrose 50% Injectable 12.5 Gram(s) IV Push once  dextrose 50% Injectable 25 Gram(s) IV Push once  dextrose 50% Injectable 25 Gram(s) IV Push once  furosemide   Injectable 40 milliGRAM(s) IV Push two times a day  heparin   Injectable 5000 Unit(s) SubCutaneous every 12 hours  insulin glargine Injectable (LANTUS) 10 Unit(s) SubCutaneous at bedtime  insulin lispro (HumaLOG) corrective regimen sliding scale   SubCutaneous three times a day before meals  lidocaine   Patch 1 Patch Transdermal daily  predniSONE   Tablet 2.5 milliGRAM(s) Oral daily  spironolactone 25 milliGRAM(s) Oral daily    MEDICATIONS  (PRN):  acetaminophen   Tablet .. 650 milliGRAM(s) Oral every 6 hours PRN Temp greater or equal to 38C (100.4F), Mild Pain (1 - 3)  dextrose 40% Gel 15 Gram(s) Oral once PRN Blood Glucose LESS THAN 70 milliGRAM(s)/deciliter  glucagon  Injectable 1 milliGRAM(s) IntraMuscular once PRN Glucose LESS THAN 70 milligrams/deciliter  oxycodone    5 mG/acetaminophen 325 mG 1 Tablet(s) Oral every 4 hours PRN Moderate Pain (4 - 6)        I&O's Summary    09 Oct 2020 07:01  -  10 Oct 2020 07:00  --------------------------------------------------------  IN: 0 mL / OUT: 5440 mL / NET: -5440 mL    10 Oct 2020 07:01  -  10 Oct 2020 14:09  --------------------------------------------------------  IN: 0 mL / OUT: 500 mL / NET: -500 mL        PHYSICAL EXAM:  Vital Signs Last 24 Hrs  T(C): 36.6 (10 Oct 2020 11:30), Max: 36.6 (10 Oct 2020 11:30)  T(F): 97.9 (10 Oct 2020 11:30), Max: 97.9 (10 Oct 2020 11:30)  HR: 60 (10 Oct 2020 11:30) (56 - 76)  BP: 102/61 (10 Oct 2020 11:30) (102/61 - 184/68)  BP(mean): --  RR: 18 (10 Oct 2020 11:30) (18 - 19)  SpO2: 97% (10 Oct 2020 11:30) (94% - 99%)        CONSTITUTIONAL: Elderly female, frail, in NAD, pleasant.  HEENT: NC/AT, PERRL, no JVD  RESPIRATORY: Decreased breath sounds R lung  CARDIOVASCULAR: RRR, S1/S2+, no m/g/r  ABDOMEN: Mildly distended abdomen, non-tender, BS+  MUSCLOSKELETAL: No edema, cyanosis or deformities.  PSYCH: A+O to person, place, and time; affect appropriate  NEUROLOGY: CN 2-12 are intact and symmetric; no gross neurological deficits.  SKIN: No rashes; no palpable lesions  VASC: Distal pulses palpable    LABS:                        12.0   8.91  )-----------( 135      ( 10 Oct 2020 03:17 )             37.1     10-10    137  |  101  |  61.0<H>  ----------------------------<  260<H>  5.1   |  25.0  |  1.99<H>    Ca    8.6      10 Oct 2020 03:17  Phos  4.0     10-10  Mg     1.9     10-10    TPro  6.8  /  Alb  2.8<L>  /  TBili  0.6  /  DBili  x   /  AST  35<H>  /  ALT  19  /  AlkPhos  163<H>  10-09    PT/INR - ( 09 Oct 2020 01:49 )   PT: 11.9 sec;   INR: 1.03 ratio         PTT - ( 09 Oct 2020 01:49 )  PTT:29.7 sec  CARDIAC MARKERS ( 09 Oct 2020 01:49 )  x     / 0.03 ng/mL / x     / x     / x          Urinalysis Basic - ( 09 Oct 2020 06:26 )    Color: Yellow / Appearance: Clear / S.010 / pH: x  Gluc: x / Ketone: Negative  / Bili: Negative / Urobili: Negative mg/dL   Blood: x / Protein: 100 mg/dL / Nitrite: Negative   Leuk Esterase: Negative / RBC: 11-25 /HPF / WBC 3-5   Sq Epi: x / Non Sq Epi: Occasional / Bacteria: Few        Culture - Body Fluid with Gram Stain (collected 09 Oct 2020 16:33)  Source: .Body Fluid Pleural Fluid  Gram Stain (09 Oct 2020 19:22):    No polymorphonuclear cells seen    No organisms seen    by cytocentrifuge  Preliminary Report (10 Oct 2020 12:47):    No growth      CAPILLARY BLOOD GLUCOSE      POCT Blood Glucose.: 273 mg/dL (10 Oct 2020 11:27)  POCT Blood Glucose.: 259 mg/dL (10 Oct 2020 08:12)  POCT Blood Glucose.: 221 mg/dL (09 Oct 2020 16:16)        RADIOLOGY & ADDITIONAL TESTS:  Results Reviewed:   Imaging Personally Reviewed:  Electrocardiogram Personally Reviewed:

## 2020-10-10 NOTE — PROGRESS NOTE ADULT - SUBJECTIVE AND OBJECTIVE BOX
PULMONARY PROGRESS NOTE      NEO BLANDON-[id number]    Patient is a 85y old  Female who presents with a chief complaint of right Pleural effusion (10 Oct 2020 08:53)      INTERVAL HPI/OVERNIGHT EVENTS: Says she feels well. On RA. Still some pain at chest tube site.     MEDICATIONS  (STANDING):  ATENolol  Tablet 50 milliGRAM(s) Oral two times a day  cloNIDine 0.2 milliGRAM(s) Oral two times a day  dextrose 5%. 1000 milliLiter(s) (50 mL/Hr) IV Continuous <Continuous>  dextrose 50% Injectable 12.5 Gram(s) IV Push once  dextrose 50% Injectable 25 Gram(s) IV Push once  dextrose 50% Injectable 25 Gram(s) IV Push once  furosemide   Injectable 40 milliGRAM(s) IV Push two times a day  insulin lispro (HumaLOG) corrective regimen sliding scale   SubCutaneous three times a day before meals  lidocaine   Patch 1 Patch Transdermal daily  predniSONE   Tablet 2.5 milliGRAM(s) Oral daily  spironolactone 25 milliGRAM(s) Oral daily      MEDICATIONS  (PRN):  acetaminophen   Tablet .. 650 milliGRAM(s) Oral every 6 hours PRN Temp greater or equal to 38C (100.4F), Mild Pain (1 - 3)  dextrose 40% Gel 15 Gram(s) Oral once PRN Blood Glucose LESS THAN 70 milliGRAM(s)/deciliter  glucagon  Injectable 1 milliGRAM(s) IntraMuscular once PRN Glucose LESS THAN 70 milligrams/deciliter  oxycodone    5 mG/acetaminophen 325 mG 1 Tablet(s) Oral every 4 hours PRN Moderate Pain (4 - 6)      Allergies    penicillin (Other)    Intolerances        PAST MEDICAL & SURGICAL HISTORY:  Ascites    Cirrhosis    DM (diabetes mellitus)    CHF (congestive heart failure)    HTN (hypertension)    S/P hernia repair               REVIEW OF SYSTEMS:    CONSTITUTIONAL:  No distress    HEENT:  Eyes:  No diplopia or blurred vision. ENT:  No earache, sore throat or runny nose.    CARDIOVASCULAR:  No pressure, squeezing, tightness, heaviness or aching about the chest; no palpitations.    RESPIRATORY:  per HPI     GASTROINTESTINAL:  No nausea, vomiting or diarrhea.    GENITOURINARY:  No dysuria, frequency or urgency.    NEUROLOGIC:  No paresthesias, fasciculations, seizures or weakness.    PSYCHIATRIC:  No disorder of thought or mood.    Vital Signs Last 24 Hrs  T(C): 36.6 (10 Oct 2020 11:30), Max: 36.6 (10 Oct 2020 11:30)  T(F): 97.9 (10 Oct 2020 11:30), Max: 97.9 (10 Oct 2020 11:30)  HR: 60 (10 Oct 2020 11:30) (56 - 76)  BP: 102/61 (10 Oct 2020 11:30) (102/61 - 184/68)  BP(mean): --  RR: 18 (10 Oct 2020 11:30) (18 - 19)  SpO2: 97% (10 Oct 2020 11:30) (94% - 99%)    PHYSICAL EXAMINATION:    GENERAL: The patient is awake and alert in no apparent distress.     HEENT: Head is normocephalic and atraumatic.   Mucous membranes are moist.    NECK: Supple.    LUNGS: decreased on R; chest tube on R; respirations unlabored    HEART: Regular rate and rhythm      ABDOMEN: Soft, nontender, and nondistended.      EXTREMITIES: Without any cyanosis, clubbing, rash, lesions or edema.    NEUROLOGIC: Grossly intact.    LABS:                        12.0   8.91  )-----------( 135      ( 10 Oct 2020 03:17 )             37.1     10-10    137  |  101  |  61.0<H>  ----------------------------<  260<H>  5.1   |  25.0  |  1.99<H>    Ca    8.6      10 Oct 2020 03:17  Phos  4.0     10-10  Mg     1.9     10-10    TPro  6.8  /  Alb  2.8<L>  /  TBili  0.6  /  DBili  x   /  AST  35<H>  /  ALT  19  /  AlkPhos  163<H>  10-09    PT/INR - ( 09 Oct 2020 01:49 )   PT: 11.9 sec;   INR: 1.03 ratio         PTT - ( 09 Oct 2020 01:49 )  PTT:29.7 sec         CARDIAC MARKERS ( 09 Oct 2020 01:49 )  x     / 0.03 ng/mL / x     / x     / x            Serum Pro-Brain Natriuretic Peptide: 4301 pg/mL (10-09-20 @ 01:49)    Lactate Dehydrogenase, Fluid: 54     Lactate Dehydrogenase, Serum: 235 U/L (10.09.20 @ 07:34)    Protein Total, Fluid: 1.5: Test Repeated       Protein Total, Serum: 6.8 g/dL (10.09.20 @ 01:49)          MICROBIOLOGY:  Culture - Body Fluid with Gram Stain (10.09.20 @ 16:33)    Gram Stain:   No polymorphonuclear cells seen  No organisms seen  by cytocentrifuge    Specimen Source: .Body Fluid Pleural Fluid    Culture Results:   No growth        RADIOLOGY & ADDITIONAL STUDIES:  CXR today pending      < from: Xray Chest 1 View- PORTABLE-Urgent (Xray Chest 1 View- PORTABLE-Urgent .) (10.09.20 @ 10:52) >     EXAM:  XR CHEST PORTABLE URGENT 1V                          PROCEDURE DATE:  10/09/2020          INTERPRETATION:  HISTORY:  ADMDIAG1: J90 SHORT OF BREATH/; ct insertion; effusion;  TECHNIQUE: Portable frontal view of the chest, 1 view.  COMPARISON: 10/9/2020] 2:00 AM.  FINDINGS:    HEART:difficult to access in this projection  LUNGS: New right-sided chest tube. Persistent right effusion. Left lung clear. No pneumothorax  BONES: degenerative changes    IMPRESSION:  New right-sided chest tube. Persistent right effusion..                LEFTY THOMASON M.D., ATTENDING RADIOLOGIST  This document has been electronically signed. Oct  9 2020  1:42PM    < end of copied text >

## 2020-10-10 NOTE — PROGRESS NOTE ADULT - ASSESSMENT
-Large TRANSUDATIVE R Pleural effusion with hx cirrhosis and ascites; likely from liver dz; perhaps contributed to by CHF and renal insufficiency,  -Hx cirrhosis  -Renal insufficiency  -SOB  -Hypoxia; see above    RECC:  Diurese. May need paracentesis. Continue chest tube to suction. F/U  cyto. O2 to keep sat >89%. DVT proph. Cardio f/u. GI f/u.     Will see Monday 10/12; call if any issues sooner.

## 2020-10-10 NOTE — PROGRESS NOTE ADULT - ASSESSMENT
85 female with R effusion with multiple possible etiologies including CHF, cirrhosis, malnutrition, ? underlying malignancy.  Pneumothorax on CXR this AM may be trapped lung.  Will repeat CXR in AM to re-evaluate.  CT Chest as per pulmonology.    Discussed with Dr. Albrecht.

## 2020-10-11 LAB
ALBUMIN SERPL ELPH-MCNC: 2.3 G/DL — LOW (ref 3.3–5.2)
ALP SERPL-CCNC: 113 U/L — SIGNIFICANT CHANGE UP (ref 40–120)
ALT FLD-CCNC: 13 U/L — SIGNIFICANT CHANGE UP
ANION GAP SERPL CALC-SCNC: 14 MMOL/L — SIGNIFICANT CHANGE UP (ref 5–17)
AST SERPL-CCNC: 23 U/L — SIGNIFICANT CHANGE UP
BILIRUB SERPL-MCNC: 0.5 MG/DL — SIGNIFICANT CHANGE UP (ref 0.4–2)
BUN SERPL-MCNC: 69 MG/DL — HIGH (ref 8–20)
CALCIUM SERPL-MCNC: 8.2 MG/DL — LOW (ref 8.6–10.2)
CHLORIDE SERPL-SCNC: 99 MMOL/L — SIGNIFICANT CHANGE UP (ref 98–107)
CO2 SERPL-SCNC: 20 MMOL/L — LOW (ref 22–29)
CREAT SERPL-MCNC: 2.44 MG/DL — HIGH (ref 0.5–1.3)
GLUCOSE BLDC GLUCOMTR-MCNC: 215 MG/DL — HIGH (ref 70–99)
GLUCOSE BLDC GLUCOMTR-MCNC: 246 MG/DL — HIGH (ref 70–99)
GLUCOSE BLDC GLUCOMTR-MCNC: 247 MG/DL — HIGH (ref 70–99)
GLUCOSE BLDC GLUCOMTR-MCNC: 263 MG/DL — HIGH (ref 70–99)
GLUCOSE SERPL-MCNC: 260 MG/DL — HIGH (ref 70–99)
HCT VFR BLD CALC: 34.2 % — LOW (ref 34.5–45)
HGB BLD-MCNC: 11.2 G/DL — LOW (ref 11.5–15.5)
INR BLD: 1 RATIO — SIGNIFICANT CHANGE UP (ref 0.88–1.16)
MCHC RBC-ENTMCNC: 31.2 PG — SIGNIFICANT CHANGE UP (ref 27–34)
MCHC RBC-ENTMCNC: 32.7 GM/DL — SIGNIFICANT CHANGE UP (ref 32–36)
MCV RBC AUTO: 95.3 FL — SIGNIFICANT CHANGE UP (ref 80–100)
PLATELET # BLD AUTO: 133 K/UL — LOW (ref 150–400)
POTASSIUM SERPL-MCNC: 4.6 MMOL/L — SIGNIFICANT CHANGE UP (ref 3.5–5.3)
POTASSIUM SERPL-SCNC: 4.6 MMOL/L — SIGNIFICANT CHANGE UP (ref 3.5–5.3)
PROT SERPL-MCNC: 5.4 G/DL — LOW (ref 6.6–8.7)
PROTHROM AB SERPL-ACNC: 11.6 SEC — SIGNIFICANT CHANGE UP (ref 10.6–13.6)
RBC # BLD: 3.59 M/UL — LOW (ref 3.8–5.2)
RBC # FLD: 14.1 % — SIGNIFICANT CHANGE UP (ref 10.3–14.5)
SODIUM SERPL-SCNC: 133 MMOL/L — LOW (ref 135–145)
SODIUM UR-SCNC: 73 MMOL/L — SIGNIFICANT CHANGE UP
WBC # BLD: 8.2 K/UL — SIGNIFICANT CHANGE UP (ref 3.8–10.5)
WBC # FLD AUTO: 8.2 K/UL — SIGNIFICANT CHANGE UP (ref 3.8–10.5)

## 2020-10-11 PROCEDURE — 99233 SBSQ HOSP IP/OBS HIGH 50: CPT

## 2020-10-11 PROCEDURE — 71045 X-RAY EXAM CHEST 1 VIEW: CPT | Mod: 26

## 2020-10-11 PROCEDURE — 71250 CT THORAX DX C-: CPT | Mod: 26

## 2020-10-11 PROCEDURE — 99231 SBSQ HOSP IP/OBS SF/LOW 25: CPT

## 2020-10-11 RX ORDER — ALBUMIN HUMAN 25 %
100 VIAL (ML) INTRAVENOUS EVERY 8 HOURS
Refills: 0 | Status: COMPLETED | OUTPATIENT
Start: 2020-10-11 | End: 2020-10-14

## 2020-10-11 RX ORDER — INFLUENZA VIRUS VACCINE 15; 15; 15; 15 UG/.5ML; UG/.5ML; UG/.5ML; UG/.5ML
0.5 SUSPENSION INTRAMUSCULAR ONCE
Refills: 0 | Status: DISCONTINUED | OUTPATIENT
Start: 2020-10-11 | End: 2020-10-20

## 2020-10-11 RX ORDER — INSULIN GLARGINE 100 [IU]/ML
13 INJECTION, SOLUTION SUBCUTANEOUS AT BEDTIME
Refills: 0 | Status: DISCONTINUED | OUTPATIENT
Start: 2020-10-11 | End: 2020-10-12

## 2020-10-11 RX ADMIN — LIDOCAINE 1 PATCH: 4 CREAM TOPICAL at 12:58

## 2020-10-11 RX ADMIN — Medication 650 MILLIGRAM(S): at 11:46

## 2020-10-11 RX ADMIN — Medication 40 MILLIGRAM(S): at 06:31

## 2020-10-11 RX ADMIN — Medication 2: at 13:00

## 2020-10-11 RX ADMIN — LIDOCAINE 1 PATCH: 4 CREAM TOPICAL at 19:00

## 2020-10-11 RX ADMIN — Medication 50 MILLILITER(S): at 21:50

## 2020-10-11 RX ADMIN — Medication 50 MILLILITER(S): at 06:43

## 2020-10-11 RX ADMIN — Medication 50 MILLILITER(S): at 16:11

## 2020-10-11 RX ADMIN — INSULIN GLARGINE 13 UNIT(S): 100 INJECTION, SOLUTION SUBCUTANEOUS at 21:36

## 2020-10-11 RX ADMIN — ATENOLOL 50 MILLIGRAM(S): 25 TABLET ORAL at 18:00

## 2020-10-11 RX ADMIN — Medication 650 MILLIGRAM(S): at 21:40

## 2020-10-11 RX ADMIN — Medication 3: at 18:00

## 2020-10-11 RX ADMIN — Medication 0.2 MILLIGRAM(S): at 06:31

## 2020-10-11 RX ADMIN — ATENOLOL 50 MILLIGRAM(S): 25 TABLET ORAL at 06:31

## 2020-10-11 RX ADMIN — Medication 2: at 08:50

## 2020-10-11 RX ADMIN — Medication 650 MILLIGRAM(S): at 22:40

## 2020-10-11 RX ADMIN — SPIRONOLACTONE 25 MILLIGRAM(S): 25 TABLET, FILM COATED ORAL at 06:31

## 2020-10-11 RX ADMIN — LIDOCAINE 1 PATCH: 4 CREAM TOPICAL at 23:16

## 2020-10-11 RX ADMIN — Medication 2.5 MILLIGRAM(S): at 06:31

## 2020-10-11 RX ADMIN — Medication 40 MILLIGRAM(S): at 18:01

## 2020-10-11 RX ADMIN — Medication 0.2 MILLIGRAM(S): at 18:01

## 2020-10-11 RX ADMIN — HEPARIN SODIUM 5000 UNIT(S): 5000 INJECTION INTRAVENOUS; SUBCUTANEOUS at 06:32

## 2020-10-11 RX ADMIN — HEPARIN SODIUM 5000 UNIT(S): 5000 INJECTION INTRAVENOUS; SUBCUTANEOUS at 18:01

## 2020-10-11 RX ADMIN — Medication 650 MILLIGRAM(S): at 08:25

## 2020-10-11 NOTE — CONSULT NOTE ADULT - SUBJECTIVE AND OBJECTIVE BOX
HPI:  85 year old F with PMH of HTN, CHF, cirrhosis with ascites, HLD, DM, AFib (no A/C) presented to ED s/p fall, reported as mechanical fall, tripped and fell, denies loss of consciousness or head injury. Patient stated she had worsening shortness of breath for the past 3 days. Patient was evaluated by her cardiologist Dr. Camp and pulmonologist Dr. Cotto on 2020, CXR outpatient which showed right sided pleural effusion and was recommended to get drained and was scheduled for follow up visit today 10/09. In the Ed pt was hypoxic, tachypneic and mild to moderate respiratory distress requiring 4L NC, CXR showed large pleural effusion, given Lasix 80mg IV push, evaluated by ct surgery for possible chest tube placement, pending Ct chest w/o contrast. Patient denies fevers/chills, dizziness, headache, chest pain, palpitation, cough, abd pain/n/v/d, urinary symptoms, recent travel or sick contact.         PAST MEDICAL & SURGICAL HISTORY:  Ascites    Cirrhosis    DM (diabetes mellitus)    CHF (congestive heart failure)    HTN (hypertension)    S/P hernia repair     DM 2, [place], hypothyroidism, prior DVT and IVC filter; Cholecystectomy    FAMILY HISTORY:  No family history of diabetes mellitus    [place]    Social History:Non smoker    MEDICATIONS  (STANDING):  albumin human 25% IVPB 50 milliLiter(s) IV Intermittent every 8 hours  ATENolol  Tablet 50 milliGRAM(s) Oral two times a day  cloNIDine 0.2 milliGRAM(s) Oral two times a day  dextrose 5%. 1000 milliLiter(s) (50 mL/Hr) IV Continuous <Continuous>  dextrose 50% Injectable 12.5 Gram(s) IV Push once  dextrose 50% Injectable 25 Gram(s) IV Push once  dextrose 50% Injectable 25 Gram(s) IV Push once  furosemide   Injectable 40 milliGRAM(s) IV Push two times a day  heparin   Injectable 5000 Unit(s) SubCutaneous every 12 hours  influenza   Vaccine 0.5 milliLiter(s) IntraMuscular once  insulin glargine Injectable (LANTUS) 13 Unit(s) SubCutaneous at bedtime  insulin lispro (HumaLOG) corrective regimen sliding scale   SubCutaneous three times a day before meals  lidocaine   Patch 1 Patch Transdermal daily  predniSONE   Tablet 2.5 milliGRAM(s) Oral daily  spironolactone 25 milliGRAM(s) Oral daily    MEDICATIONS  (PRN):  acetaminophen   Tablet .. 650 milliGRAM(s) Oral every 6 hours PRN Temp greater or equal to 38C (100.4F), Mild Pain (1 - 3)  dextrose 40% Gel 15 Gram(s) Oral once PRN Blood Glucose LESS THAN 70 milliGRAM(s)/deciliter  glucagon  Injectable 1 milliGRAM(s) IntraMuscular once PRN Glucose LESS THAN 70 milligrams/deciliter  oxycodone    5 mG/acetaminophen 325 mG 1 Tablet(s) Oral every 4 hours PRN Moderate Pain (4 - 6)   Meds reviewed    Allergies    penicillin (Other)    Intolerances     ? lasix    REVIEW OF SYSTEMS:    CONSTITUTIONAL:  sob, weight gain, feels weak  EYES: No eye pain, visual disturbances, or discharge  ENMT:  No difficulty hearing, tinnitus, vertigo; No sinus or throat pain  NECK: No pain or stiffness  BREASTS: No pain, masses, or nipple discharge  RESPIRATORY: sob  CARDIOVASCULAR: No chest pain, palpitations, dizziness,   GASTROINTESTINAL: No abdominal or epigastric pain. No nausea, vomiting, or hematemesis; No diarrhea or constipation. No melena or hematochezia.Trunckal obesity  GENITOURINARY: No dysuria, frequency, hematuria, or incontinence  NEUROLOGICAL: No headaches, memory loss, loss of strength, numbness, or tremors  SKIN: Diffuse erythema, no blisters  LYMPH NODES: No enlarged glands  ENDOCRINE: No heat or cold intolerance; No hair loss  MUSCULOSKELETAL: Chronic lymphedema legs with scars  PSYCHIATRIC: No depression, anxiety, mood swings, or difficulty sleeping  HEME/LYMPH: No easy bruising, or bleeding gums  ALLERY AND IMMUNOLOGIC: No hives or eczema      Vital Signs Last 24 Hrs  T(C): 36.4 (11 Oct 2020 09:53), Max: 36.6 (10 Oct 2020 11:30)  T(F): 97.5 (11 Oct 2020 09:53), Max: 97.9 (10 Oct 2020 11:30)  HR: 62 (11 Oct 2020 09:53) (58 - 65)  BP: 106/68 (11 Oct 2020 09:53) (102/61 - 160/84)  BP(mean): --  RR: 18 (11 Oct 2020 09:53) (16 - 20)  SpO2: 97% (11 Oct 2020 09:53) (94% - 100%)  Daily     Daily Weight in k (11 Oct 2020 06:54)    PHYSICAL EXAM:    GENERAL: appears chronically ill  HEAD:  NCAT  EYES: EOMI  NECK: Supple, neck  veins full  NERVOUS SYSTEM:  Alert & Oriented X3  CHEST/LUNG: Clear to percussion bilaterally  HEART: Regular rate and rhythm  ABDOMEN: Soft, Nontender, Nondistended; Bowel sounds present  EXTREMITIES:  + edema    LABS:                        11.2   8.20  )-----------( 133      ( 11 Oct 2020 08:26 )             34.2     10-11    133<L>  |  99  |  69.0<H>  ----------------------------<  260<H>  4.6   |  20.0<L>  |  2.44<H>    Ca    8.2<L>      11 Oct 2020 08:26  Phos  4.0     10-10  Mg     1.9     10-10    TPro  5.4<L>  /  Alb  2.3<L>  /  TBili  0.5  /  DBili  x   /  AST  23  /  ALT  13  /  AlkPhos  113  10-11    PT/INR - ( 11 Oct 2020 08:26 )   PT: 11.6 sec;   INR: 1.00 ratio                     RADIOLOGY & ADDITIONAL TESTS:     HPI:  85 year old F with PMH of HTN, CHF, cirrhosis with ascites, HLD, DM, AFib (no A/C) presented to ED s/p fall, reported as mechanical fall, tripped and fell, denies loss of consciousness or head injury. Patient stated she had worsening shortness of breath for the past 3 days. Patient was evaluated by her cardiologist Dr. Camp and pulmonologist Dr. Cotto on 2020, CXR outpatient which showed right sided pleural effusion and was recommended to get drained and was scheduled for follow up visit today 10/09. In the Ed pt was hypoxic, tachypneic and mild to moderate respiratory distress requiring 4L NC, CXR showed large pleural effusion, given Lasix 80mg IV push, evaluated by ct surgery for possible chest tube placement, pending Ct chest w/o contrast. Patient denies fevers/chills, dizziness, headache, chest pain, palpitation, cough, abd pain/n/v/d, urinary symptoms, recent travel or sick contact. found to have elevated Cr        PAST MEDICAL & SURGICAL HISTORY:  Ascites    Cirrhosis    DM (diabetes mellitus)    CHF (congestive heart failure)    HTN (hypertension)    S/P hernia repair     DM 2, [place], hypothyroidism, prior DVT and IVC filter; Cholecystectomy    FAMILY HISTORY:  No family history of diabetes mellitus    NC    Social History:Non smoker    MEDICATIONS  (STANDING):  albumin human 25% IVPB 50 milliLiter(s) IV Intermittent every 8 hours  ATENolol  Tablet 50 milliGRAM(s) Oral two times a day  cloNIDine 0.2 milliGRAM(s) Oral two times a day  dextrose 5%. 1000 milliLiter(s) (50 mL/Hr) IV Continuous <Continuous>  dextrose 50% Injectable 12.5 Gram(s) IV Push once  dextrose 50% Injectable 25 Gram(s) IV Push once  dextrose 50% Injectable 25 Gram(s) IV Push once  furosemide   Injectable 40 milliGRAM(s) IV Push two times a day  heparin   Injectable 5000 Unit(s) SubCutaneous every 12 hours  influenza   Vaccine 0.5 milliLiter(s) IntraMuscular once  insulin glargine Injectable (LANTUS) 13 Unit(s) SubCutaneous at bedtime  insulin lispro (HumaLOG) corrective regimen sliding scale   SubCutaneous three times a day before meals  lidocaine   Patch 1 Patch Transdermal daily  predniSONE   Tablet 2.5 milliGRAM(s) Oral daily  spironolactone 25 milliGRAM(s) Oral daily    MEDICATIONS  (PRN):  acetaminophen   Tablet .. 650 milliGRAM(s) Oral every 6 hours PRN Temp greater or equal to 38C (100.4F), Mild Pain (1 - 3)  dextrose 40% Gel 15 Gram(s) Oral once PRN Blood Glucose LESS THAN 70 milliGRAM(s)/deciliter  glucagon  Injectable 1 milliGRAM(s) IntraMuscular once PRN Glucose LESS THAN 70 milligrams/deciliter  oxycodone    5 mG/acetaminophen 325 mG 1 Tablet(s) Oral every 4 hours PRN Moderate Pain (4 - 6)   Meds reviewed    Allergies    penicillin (Other)        Vital Signs Last 24 Hrs  T(C): 36.4 (11 Oct 2020 09:53), Max: 36.6 (10 Oct 2020 11:30)  T(F): 97.5 (11 Oct 2020 09:53), Max: 97.9 (10 Oct 2020 11:30)  HR: 62 (11 Oct 2020 09:53) (58 - 65)  BP: 106/68 (11 Oct 2020 09:53) (102/61 - 160/84)  BP(mean): --  RR: 18 (11 Oct 2020 09:53) (16 - 20)  SpO2: 97% (11 Oct 2020 09:53) (94% - 100%)  Daily     Daily Weight in k (11 Oct 2020 06:54)    PHYSICAL EXAM:    GENERAL: appears chronically ill  HEAD:  NCAT  EYES: EOMI  NECK: Supple, neck  veins full  NERVOUS SYSTEM:  Alert & Oriented X3  CHEST/LUNG: Clear to percussion bilaterally  HEART: Regular rate and rhythm  ABDOMEN: Soft, Nontender, Nondistended; Bowel sounds present  EXTREMITIES:  + edema    LABS:                        11.2   8.20  )-----------( 133      ( 11 Oct 2020 08:26 )             34.2     10-11    133<L>  |  99  |  69.0<H>  ----------------------------<  260<H>  4.6   |  20.0<L>  |  2.44<H>    Ca    8.2<L>      11 Oct 2020 08:26  Phos  4.0     10-10  Mg     1.9     10-10    TPro  5.4<L>  /  Alb  2.3<L>  /  TBili  0.5  /  DBili  x   /  AST  23  /  ALT  13  /  AlkPhos  113  10-11    PT/INR - ( 11 Oct 2020 08:26 )   PT: 11.6 sec;   INR: 1.00 ratio                     RADIOLOGY & ADDITIONAL TESTS:

## 2020-10-11 NOTE — PROGRESS NOTE ADULT - ASSESSMENT
-Large TRANSUDATIVE R Pleural effusion with hx cirrhosis and ascites; likely from liver dz; perhaps contributed to by CHF and renal insufficiency,  -PTX on R; likely trapped lung; mass lesion vs pleural scarring  -Hx cirrhosis  -Renal insufficiency  -SOB  -Hypoxia; see above    RECC:  Repeat CT chest. Diurese. May need paracentesis. Continue chest tube to suction. F/U  cyto. O2 to keep sat >89%. T-surg f/u. DVT proph. Cardio f/u. GI f/u.

## 2020-10-11 NOTE — CONSULT NOTE ADULT - ASSESSMENT
DEVYN on CKD suspect stage III  Baseline cr seems to be approx 1.8  Has h/o HTN, CHF, cirrhosis with ascites, HLD, AFib (no A/C)   DM poor control A1C 8.5  presented to ED s/p fall and SOB  Clinically hypervolemic 2/2 cirrosis  Large R leural effusion s/p CT   Agree w Lasix 40 mg BID / Aldactone 25 mg Q day  Plans for abd paracentesis after renal optimazation  Edema albumin will improve intavascualr volume  agree w coarse of albumin 2-3 days  Will check Urine Na, renal sonogram, 24 hr urine study  Cards/ Pulm/ GI evals notes    Will follow DEVYN on CKD suspect stage III  Baseline cr seems to be approx 1.8  Has h/o HTN, CHF, cirrhosis with ascites, HLD, AFib (no A/C)   DM poor control A1C 8.5  presented to ED s/p fall and SOB  Clinically hypervolemic 2/2 cirrosis  Large R leural effusion s/p CT   Agree w Lasix 40 mg BID / Aldactone 25 mg Q day  Plans for abd paracentesis after renal optimazation  Edema albumin will improve intavascualr volume  agree w coarse of IV albumin 2-3 days  Will check Urine Na, renal sonogram, 24 hr urine study  Cards/ Pulm/ GI evals notes    Will follow

## 2020-10-11 NOTE — PROGRESS NOTE ADULT - ASSESSMENT
85 female with R effusion with multiple possible etiologies including CHF, cirrhosis, malnutrition, ? underlying malignancy.  Pneumothorax on CXR likely trapped lung.  Daily CXR.  CT Chest as per pulmonology.  Treatment of ascites/cirrhosis as per team/GI.  Would consider TIPS if there would be any benefit from hepatic standpoint as it may reduce the severity of pleural effusions associated with cirrhosis.    Discussed with Dr. Albrecht.

## 2020-10-11 NOTE — PROGRESS NOTE ADULT - ASSESSMENT
Overall, I am concerned that a therapeutic paracentesis may worsen her renal function and propel her into irreversible hepatorenal syndrome.  However, if we do not address her ascites, the pleural effusion, which is likely a hepatic hydrothorax, will reaccumulate.  Her renal function is tenuous and prior to subjecting her to another significant fluid shift, I will continue to give her salt poor albumin as a fluid challenge to improve her intravascular volume and give her a better chance of a good outcome.  Ultimately, her frailty, age, and comorbidities portend a poor prognosis.  It may be time to begin hospice discussions with her and her family.

## 2020-10-11 NOTE — PROGRESS NOTE ADULT - ASSESSMENT
85 year old F with PMH of HTN, CHF, cirrhosis with ascites, HLD, DM, AFib (no A/C) presented to ED s/p fall, reported as mechanical fall, tripped and fell, denies loss of consciousness or head injury. Patient stated she had worsening shortness of breath for the past 3 days.  Acute hypoxic respiratory failure due to right pleural effusion, likely hepatic hydrothorax   -CXR showed large right side pleural effusion  - S/p chest tube, follow up cytology results  - CTS, Pulm following  - Supplemental oxygen via NC prn  - Daily CXR  - CXR today read as moderate PTX of R lung, possible due to trapped lung. CT chest ordered to r/o malignancy although less likely, as per Pulm.    Volume overload 2/2 to cirrhosis  - Cardiology consulted, recs appreciated.  - GI consulted, recs apprecitated  - Strict I&Os, daily weights, fluid/salt restriction.  - Lasix 40mg IV BID  - C/w Spirinolactone 25mg daily  - Telemetry monitoring  - C/w albumin for 48-72 hours to optimize for possible paracentisis.    DEVYN vs CKD  -likely on ckd, unknown baseline cr   -monitor renal function closely while on diuretics   -avoid nephrotoxic meds     Hypertensive urgency, resolved  -/115 likely due to missed medications   -s/p hydrazine 10mg IV push BP improved to 151/77  -cont with Lasix 40mg IV push, spironolactone 25mg and clonidine        IDDM  - Lantus 13u qhs  -ISS, hypoglycemic protocol, FSG     Arthritis   -cont with prednisone     Severe protein calorie malnutrition  - Supplementation  DVT ppx   - Heparin sq  Dispo: Pending clinical course (GI/CTS)    Family (Daughter, Saba) updated yesterday  regarding patient's clinical condition. They were explained eventual need for paracentesis.  They requested to be updated prior to proceeding. They also want full treatment for now, however enquired about home hospice in case of worsening clinical situation and need for aggressive interventions. Palliative consult placed.   85 year old F with PMH of HTN, CHF, cirrhosis with ascites, HLD, DM, AFib (no A/C) presented to ED s/p fall, reported as mechanical fall, tripped and fell, denies loss of consciousness or head injury. Patient stated she had worsening shortness of breath for the past 3 days.  Acute hypoxic respiratory failure due to right pleural effusion, likely hepatic hydrothorax   -CXR showed large right side pleural effusion  - S/p chest tube, follow up cytology results  - CTS, Pulm following  - Supplemental oxygen via NC prn  - Daily CXR  - CXR today read as moderate PTX of R lung, possible due to trapped lung. CT chest ordered to r/o malignancy although less likely, as per Pulm. CT completed, awaiting read.    Volume overload 2/2 to cirrhosis  - Cardiology consulted, recs appreciated.  - GI consulted, recs apprecitated  - Strict I&Os, daily weights, fluid/salt restriction.  - Lasix 40mg IV BID  - C/w Spirinolactone 25mg daily  - Telemetry monitoring  - C/w albumin for 48-72 hours to optimize for possible paracentisis.    DEVYN vs CKD  -likely on ckd, unknown baseline cr   -monitor renal function closely while on diuretics   -avoid nephrotoxic meds     Hypertensive urgency, resolved  -/115 likely due to missed medications   -s/p hydrazine 10mg IV push BP improved to 151/77  -cont with Lasix 40mg IV push, spironolactone 25mg and clonidine        IDDM  - Lantus 13u qhs  -ISS, hypoglycemic protocol, FSG     Arthritis   -cont with prednisone     Severe protein calorie malnutrition  - Supplementation  DVT ppx   - Heparin sq  Dispo: Pending clinical course (GI/CTS)    Family (Daughter, Saba) updated yesterday  regarding patient's clinical condition. They were explained eventual need for paracentesis.  They requested to be updated prior to proceeding. They also want full treatment for now, however enquired about home hospice in case of worsening clinical situation and need for aggressive interventions. Palliative consult placed.

## 2020-10-11 NOTE — PROGRESS NOTE ADULT - SUBJECTIVE AND OBJECTIVE BOX
[facility] Division of Hospital Medicine  Harlan Busch [phone]    Chief Complaint:  Patient is a 85y old  Female who presents with a chief complaint of right Pleural effusion (10 Oct 2020 13:17)      SUBJECTIVE / OVERNIGHT EVENTS:  Patient seen and examined at bedside. No acute events reported overnight. Patient reports feeling about the same, improved edema. No new complaints.     Patient denies chest pain, abd pain, N/V, fever, chills, dysuria or any other complaints. All remainder ROS negative.     MEDICATIONS  (STANDING):  ATENolol  Tablet 50 milliGRAM(s) Oral two times a day  cloNIDine 0.2 milliGRAM(s) Oral two times a day  dextrose 5%. 1000 milliLiter(s) (50 mL/Hr) IV Continuous <Continuous>  dextrose 50% Injectable 12.5 Gram(s) IV Push once  dextrose 50% Injectable 25 Gram(s) IV Push once  dextrose 50% Injectable 25 Gram(s) IV Push once  furosemide   Injectable 40 milliGRAM(s) IV Push two times a day  heparin   Injectable 5000 Unit(s) SubCutaneous every 12 hours  insulin glargine Injectable (LANTUS) 10 Unit(s) SubCutaneous at bedtime  insulin lispro (HumaLOG) corrective regimen sliding scale   SubCutaneous three times a day before meals  lidocaine   Patch 1 Patch Transdermal daily  predniSONE   Tablet 2.5 milliGRAM(s) Oral daily  spironolactone 25 milliGRAM(s) Oral daily    MEDICATIONS  (PRN):  acetaminophen   Tablet .. 650 milliGRAM(s) Oral every 6 hours PRN Temp greater or equal to 38C (100.4F), Mild Pain (1 - 3)  dextrose 40% Gel 15 Gram(s) Oral once PRN Blood Glucose LESS THAN 70 milliGRAM(s)/deciliter  glucagon  Injectable 1 milliGRAM(s) IntraMuscular once PRN Glucose LESS THAN 70 milligrams/deciliter  oxycodone    5 mG/acetaminophen 325 mG 1 Tablet(s) Oral every 4 hours PRN Moderate Pain (4 - 6)        I&O's Summary    09 Oct 2020 07:01  -  10 Oct 2020 07:00  --------------------------------------------------------  IN: 0 mL / OUT: 5440 mL / NET: -5440 mL    10 Oct 2020 07:01  -  10 Oct 2020 14:09  --------------------------------------------------------  IN: 0 mL / OUT: 500 mL / NET: -500 mL        PHYSICAL EXAM:  Vital Signs Last 24 Hrs  T(C): 36.6 (10 Oct 2020 11:30), Max: 36.6 (10 Oct 2020 11:30)  T(F): 97.9 (10 Oct 2020 11:30), Max: 97.9 (10 Oct 2020 11:30)  HR: 60 (10 Oct 2020 11:30) (56 - 76)  BP: 102/61 (10 Oct 2020 11:30) (102/61 - 184/68)  BP(mean): --  RR: 18 (10 Oct 2020 11:30) (18 - 19)  SpO2: 97% (10 Oct 2020 11:30) (94% - 99%)        CONSTITUTIONAL: Elderly female, frail, in NAD, pleasant.  HEENT: NC/AT, PERRL, no JVD  RESPIRATORY: Decreased breath sounds R lung  CARDIOVASCULAR: RRR, S1/S2+, no m/g/r  ABDOMEN: Mildly distended abdomen, non-tender, BS+  MUSCLOSKELETAL: Trace pedal edema, no cyanosis or deformities.  PSYCH: A+O to person, place, and time; affect appropriate  NEUROLOGY: CN 2-12 are intact and symmetric; no gross neurological deficits.  SKIN: No rashes; no palpable lesions  VASC: Distal pulses palpable    LABS:                        12.0   8.91  )-----------( 135      ( 10 Oct 2020 03:17 )             37.1     10-10    137  |  101  |  61.0<H>  ----------------------------<  260<H>  5.1   |  25.0  |  1.99<H>    Ca    8.6      10 Oct 2020 03:17  Phos  4.0     10-10  Mg     1.9     10-10    TPro  6.8  /  Alb  2.8<L>  /  TBili  0.6  /  DBili  x   /  AST  35<H>  /  ALT  19  /  AlkPhos  163<H>  10-09    PT/INR - ( 09 Oct 2020 01:49 )   PT: 11.9 sec;   INR: 1.03 ratio         PTT - ( 09 Oct 2020 01:49 )  PTT:29.7 sec  CARDIAC MARKERS ( 09 Oct 2020 01:49 )  x     / 0.03 ng/mL / x     / x     / x          Urinalysis Basic - ( 09 Oct 2020 06:26 )    Color: Yellow / Appearance: Clear / S.010 / pH: x  Gluc: x / Ketone: Negative  / Bili: Negative / Urobili: Negative mg/dL   Blood: x / Protein: 100 mg/dL / Nitrite: Negative   Leuk Esterase: Negative / RBC: 11-25 /HPF / WBC 3-5   Sq Epi: x / Non Sq Epi: Occasional / Bacteria: Few        Culture - Body Fluid with Gram Stain (collected 09 Oct 2020 16:33)  Source: .Body Fluid Pleural Fluid  Gram Stain (09 Oct 2020 19:22):    No polymorphonuclear cells seen    No organisms seen    by cytocentrifuge  Preliminary Report (10 Oct 2020 12:47):    No growth      CAPILLARY BLOOD GLUCOSE      POCT Blood Glucose.: 273 mg/dL (10 Oct 2020 11:27)  POCT Blood Glucose.: 259 mg/dL (10 Oct 2020 08:12)  POCT Blood Glucose.: 221 mg/dL (09 Oct 2020 16:16)        RADIOLOGY & ADDITIONAL TESTS:  Results Reviewed:   Imaging Personally Reviewed:  Electrocardiogram Personally Reviewed:

## 2020-10-11 NOTE — PROGRESS NOTE ADULT - SUBJECTIVE AND OBJECTIVE BOX
PULMONARY PROGRESS NOTE      NEO BLANDON-[id number]    Patient is a 85y old  Female who presents with a chief complaint of right Pleural effusion (11 Oct 2020 10:29)      INTERVAL HPI/OVERNIGHT EVENTS: She said she is feeling great. Denies sob, cough. On RA.     MEDICATIONS  (STANDING):  albumin human 25% IVPB 100 milliLiter(s) IV Intermittent every 8 hours  ATENolol  Tablet 50 milliGRAM(s) Oral two times a day  cloNIDine 0.2 milliGRAM(s) Oral two times a day  dextrose 5%. 1000 milliLiter(s) (50 mL/Hr) IV Continuous <Continuous>  dextrose 50% Injectable 12.5 Gram(s) IV Push once  dextrose 50% Injectable 25 Gram(s) IV Push once  dextrose 50% Injectable 25 Gram(s) IV Push once  furosemide   Injectable 40 milliGRAM(s) IV Push two times a day  heparin   Injectable 5000 Unit(s) SubCutaneous every 12 hours  influenza   Vaccine 0.5 milliLiter(s) IntraMuscular once  insulin glargine Injectable (LANTUS) 13 Unit(s) SubCutaneous at bedtime  insulin lispro (HumaLOG) corrective regimen sliding scale   SubCutaneous three times a day before meals  lidocaine   Patch 1 Patch Transdermal daily  predniSONE   Tablet 2.5 milliGRAM(s) Oral daily  spironolactone 25 milliGRAM(s) Oral daily      MEDICATIONS  (PRN):  acetaminophen   Tablet .. 650 milliGRAM(s) Oral every 6 hours PRN Temp greater or equal to 38C (100.4F), Mild Pain (1 - 3)  dextrose 40% Gel 15 Gram(s) Oral once PRN Blood Glucose LESS THAN 70 milliGRAM(s)/deciliter  glucagon  Injectable 1 milliGRAM(s) IntraMuscular once PRN Glucose LESS THAN 70 milligrams/deciliter  oxycodone    5 mG/acetaminophen 325 mG 1 Tablet(s) Oral every 4 hours PRN Moderate Pain (4 - 6)      Allergies    penicillin (Other)    Intolerances        PAST MEDICAL & SURGICAL HISTORY:  Ascites    Cirrhosis    DM (diabetes mellitus)    CHF (congestive heart failure)    HTN (hypertension)    S/P hernia repair              Vital Signs Last 24 Hrs  T(C): 36.4 (11 Oct 2020 09:53), Max: 36.6 (10 Oct 2020 11:30)  T(F): 97.5 (11 Oct 2020 09:53), Max: 97.9 (10 Oct 2020 11:30)  HR: 62 (11 Oct 2020 09:53) (58 - 65)  BP: 106/68 (11 Oct 2020 09:53) (102/61 - 160/84)  BP(mean): --  RR: 18 (11 Oct 2020 09:53) (16 - 20)  SpO2: 97% (11 Oct 2020 09:53) (94% - 100%)    PHYSICAL EXAMINATION:    GENERAL: The patient is awake and alert in no apparent distress.     HEENT: Head is normocephalic and atraumatic.   Mucous membranes are moist.    NECK: Supple.    LUNGS: R chest tube in place, decreased BS on R; L CTA; respirations unlabored    HEART: Regular rate and rhythm     ABDOMEN: Soft, nontender, and nondistended.      EXTREMITIES: Without any cyanosis, clubbing, rash, lesions or edema.    NEUROLOGIC: Grossly intact.                   RADIOLOGY & ADDITIONAL STUDIES:  CT chest pending    < from: Xray Chest 1 View- PORTABLE-Routine (Xray Chest 1 View- PORTABLE-Routine in AM.) (10.10.20 @ 06:10) >     EXAM:  XR CHEST PORTABLE ROUTINE 1V                          PROCEDURE DATE:  10/10/2020          INTERPRETATION:  INDICATION: Effusions; chest tube insertion.    PRIORS: 10/9/2020 at 10:29 AM    VIEWS: Portable AP radiography of the chest performed.    FINDINGS: Heart size appears within normal limits. No hilar or superior mediastinal abnormalities are identified. Since prior evaluation there is significant interval decrease in right pleural effusion with a new moderately sized right-sided pleural air collection. A right pleural space pigtail catheter is identified. No acute left lung infiltrate. No left pleural effusion is identified. No left pleural air. No mediastinal shift.    IMPRESSION: Significant interval decrease in right pleuraleffusion; moderately sized right pleural air collection is currently visualized. Clinical correlation and follow-up suggested.                ROBERT REYNA M.D., ATTENDING RADIOLOGIST  This document has been electronically signed. Oct 10 2020  2:10PM    < end of copied text >

## 2020-10-11 NOTE — PROGRESS NOTE ADULT - SUBJECTIVE AND OBJECTIVE BOX
Chief Complaint: This is a 85y old woman patient being seen in follow-up consultation for cirrhosis.    HPI / 24H events:  Renal function worse.  No new complaints from patient.  No abdominal pain.    ROS: A 14-point review of systems was reviewed and was otherwise negative save what was reported in the HPI.    PAST MEDICAL/SURGICAL HISTORY:  Ascites    Cirrhosis    DM (diabetes mellitus)    CHF (congestive heart failure)    HTN (hypertension)    S/P hernia repair      MEDICATIONS  (STANDING):  albumin human 25% IVPB 100 milliLiter(s) IV Intermittent every 8 hours  ATENolol  Tablet 50 milliGRAM(s) Oral two times a day  cloNIDine 0.2 milliGRAM(s) Oral two times a day  dextrose 5%. 1000 milliLiter(s) (50 mL/Hr) IV Continuous <Continuous>  dextrose 50% Injectable 12.5 Gram(s) IV Push once  dextrose 50% Injectable 25 Gram(s) IV Push once  dextrose 50% Injectable 25 Gram(s) IV Push once  furosemide   Injectable 40 milliGRAM(s) IV Push two times a day  heparin   Injectable 5000 Unit(s) SubCutaneous every 12 hours  influenza   Vaccine 0.5 milliLiter(s) IntraMuscular once  insulin glargine Injectable (LANTUS) 13 Unit(s) SubCutaneous at bedtime  insulin lispro (HumaLOG) corrective regimen sliding scale   SubCutaneous three times a day before meals  lidocaine   Patch 1 Patch Transdermal daily  predniSONE   Tablet 2.5 milliGRAM(s) Oral daily  spironolactone 25 milliGRAM(s) Oral daily    MEDICATIONS  (PRN):  acetaminophen   Tablet .. 650 milliGRAM(s) Oral every 6 hours PRN Temp greater or equal to 38C (100.4F), Mild Pain (1 - 3)  dextrose 40% Gel 15 Gram(s) Oral once PRN Blood Glucose LESS THAN 70 milliGRAM(s)/deciliter  glucagon  Injectable 1 milliGRAM(s) IntraMuscular once PRN Glucose LESS THAN 70 milligrams/deciliter  oxycodone    5 mG/acetaminophen 325 mG 1 Tablet(s) Oral every 4 hours PRN Moderate Pain (4 - 6)    penicillin (Other)    T(C): 36.4 (10-11-20 @ 09:53), Max: 36.5 (10-11-20 @ 05:59)  HR: 62 (10-11-20 @ 09:53) (58 - 65)  BP: 106/68 (10-11-20 @ 09:53) (106/68 - 160/84)  RR: 18 (10-11-20 @ 09:53) (16 - 20)  SpO2: 97% (10-11-20 @ 09:53) (94% - 100%)    I&O's Summary    10 Oct 2020 07:01  -  11 Oct 2020 07:00  --------------------------------------------------------  IN: 0 mL / OUT: 1590 mL / NET: -1590 mL    11 Oct 2020 07:01  -  11 Oct 2020 13:12  --------------------------------------------------------  IN: 200 mL / OUT: 0 mL / NET: 200 mL      PHYSICAL EXAM:  Constitutional: Chronically ill, elderly  woman in no apparent distress  Eyes: Sclerae anicteric, conjunctivae normal  ENMT: Mucus membranes moist, no oropharyngeal thrush noted  Neck: No thyroid nodules appreciated, no significant cervical or supraclavicular lymphadenopathy  Respiratory: Breathing nonlabored  Cardiovascular: Regular rate and rhythm  Gastrointestinal: Soft, nontender, nondistended, normoactive bowel sounds; no hepatosplenomegaly appreciated; no rebound tenderness or involuntary guarding  Extremities: No clubbing, cyanosis or edema  Neurological: Alert and oriented to person, place and time; no asterixis  Skin: No jaundice  Lymph Nodes: No significant lymphadenopathy  Musculoskeletal: No significant peripheral atrophy  Psychiatric: Affect and mood appropriate                   11.2   8.20  )-----------( 133      ( 10-11 @ 08:26 )             34.2                12.0   8.91  )-----------( 135      ( 10-10 @ 03:17 )             37.1                13.6   8.04  )-----------( 190      ( 10-09 @ 01:49 )             42.0       10-11    133<L>  |  99  |  69.0<H>  ----------------------------<  260<H>  4.6   |  20.0<L>  |  2.44<H>    Ca    8.2<L>      11 Oct 2020 08:26  Phos  4.0     10-10  Mg     1.9     10-10    TPro  5.4<L>  /  Alb  2.3<L>  /  TBili  0.5  /  DBili  x   /  AST  23  /  ALT  13  /  AlkPhos  113  10-11    LIVER FUNCTIONS - ( 11 Oct 2020 08:26 )  Alb: 2.3 g/dL / Pro: 5.4 g/dL / ALK PHOS: 113 U/L / ALT: 13 U/L / AST: 23 U/L / GGT: x             Lipase, Serum: 44 U/L (10-09-20 @ 01:49)    PT/INR - ( 11 Oct 2020 08:26 )   PT: 11.6 sec;   INR: 1.00 ratio             Culture - Body Fluid with Gram Stain (collected 09 Oct 2020 16:33)  Source: .Body Fluid Pleural Fluid  Gram Stain (09 Oct 2020 19:22):    No polymorphonuclear cells seen    No organisms seen    by cytocentrifuge  Preliminary Report (10 Oct 2020 12:47):    No growth

## 2020-10-11 NOTE — PROGRESS NOTE ADULT - SUBJECTIVE AND OBJECTIVE BOX
85y Female s/p R pleural pigtail for large effusion related to cirrhosis    Subjective: She says she feels better than she has in a while.  Her nausea is gone, she has an appetite, and she feels stronger.  Denies pain.      T(C): 36.4 (10-11-20 @ 09:53), Max: 36.6 (10-10-20 @ 11:30)  HR: 62 (10-11-20 @ 09:53) (58 - 65)  BP: 106/68 (10-11-20 @ 09:53) (102/61 - 160/84)  ABP: --  ABP(mean): --  RR: 18 (10-11-20 @ 09:53) (16 - 20)  SpO2: 97% (10-11-20 @ 09:53) (94% - 100%)  Wt(kg): --  CVP(mm Hg): --  CO: --  CI: --  PA: --         10-11    133<L>  |  99  |  69.0<H>  ----------------------------<  260<H>  4.6   |  20.0<L>  |  2.44<H>    Ca    8.2<L>      11 Oct 2020 08:26  Phos  4.0     10-10  Mg     1.9     10-10    TPro  5.4<L>  /  Alb  2.3<L>  /  TBili  0.5  /  DBili  x   /  AST  23  /  ALT  13  /  AlkPhos  113  10-11                               11.2   8.20  )-----------( 133      ( 11 Oct 2020 08:26 )             34.2        PT/INR - ( 11 Oct 2020 08:26 )   PT: 11.6 sec;   INR: 1.00 ratio                      CAPILLARY BLOOD GLUCOSE      POCT Blood Glucose.: 246 mg/dL (11 Oct 2020 07:57)  POCT Blood Glucose.: 238 mg/dL (10 Oct 2020 21:58)  POCT Blood Glucose.: 278 mg/dL (10 Oct 2020 16:51)  POCT Blood Glucose.: 273 mg/dL (10 Oct 2020 11:27)           CXR:    I&O's Detail    10 Oct 2020 07:01  -  11 Oct 2020 07:00  --------------------------------------------------------  IN:  Total IN: 0 mL    OUT:    Chest Tube (mL): 1390 mL    Voided (mL): 200 mL  Total OUT: 1590 mL    Total NET: -1590 mL          MEDICATIONS  (STANDING):  albumin human 25% IVPB 50 milliLiter(s) IV Intermittent every 8 hours  ATENolol  Tablet 50 milliGRAM(s) Oral two times a day  cloNIDine 0.2 milliGRAM(s) Oral two times a day  dextrose 5%. 1000 milliLiter(s) (50 mL/Hr) IV Continuous <Continuous>  dextrose 50% Injectable 12.5 Gram(s) IV Push once  dextrose 50% Injectable 25 Gram(s) IV Push once  dextrose 50% Injectable 25 Gram(s) IV Push once  furosemide   Injectable 40 milliGRAM(s) IV Push two times a day  heparin   Injectable 5000 Unit(s) SubCutaneous every 12 hours  influenza   Vaccine 0.5 milliLiter(s) IntraMuscular once  insulin glargine Injectable (LANTUS) 10 Unit(s) SubCutaneous at bedtime  insulin lispro (HumaLOG) corrective regimen sliding scale   SubCutaneous three times a day before meals  lidocaine   Patch 1 Patch Transdermal daily  predniSONE   Tablet 2.5 milliGRAM(s) Oral daily  spironolactone 25 milliGRAM(s) Oral daily    MEDICATIONS  (PRN):  acetaminophen   Tablet .. 650 milliGRAM(s) Oral every 6 hours PRN Temp greater or equal to 38C (100.4F), Mild Pain (1 - 3)  dextrose 40% Gel 15 Gram(s) Oral once PRN Blood Glucose LESS THAN 70 milliGRAM(s)/deciliter  glucagon  Injectable 1 milliGRAM(s) IntraMuscular once PRN Glucose LESS THAN 70 milligrams/deciliter  oxycodone    5 mG/acetaminophen 325 mG 1 Tablet(s) Oral every 4 hours PRN Moderate Pain (4 - 6)      Physical Exam  Neuro: A+O x 3, non-focal, speech clear and intact  Pulm: decreased on right.  Pigtail in place with large amount of drainage 1.7L.  No air leak    -----------------------------------------------------------------------------------------------------------------------------------------------------------------------------  -----------------------------------------------------------------------------------------------------------------------------------------------------------------------------

## 2020-10-11 NOTE — PROGRESS NOTE ADULT - SUBJECTIVE AND OBJECTIVE BOX
[facility], THE HEART [facility]ENTER                                   [address] [place] [address]                                                      PHONE: [phone]                                                         FAX: [phone]  [email or web address]  ---------------------------------------------------------------------------------------------------------------------------------    Overnight events/patient complaints: continues to report feeling markedly improved and presently only concern relates to discomfort at chest tube site     INTERPRETATION OF TELEMETRY (personally reviewed):    ECG: < from: 12 Lead ECG (10.09.20 @ 01:07) >  Normal sinus rhythm with multiple and consecutive APC's  Anteroseptal infarct , age undetermined  Abnormal ECG    ECHO FINDINGS: 8/20/2020 LVEF normal 60% normal RV size and contractility mild MAC mild MR aortic sclerosis without stenosis moderate TR estimated RVSP 45 mmHg, minimal pericardial effusion anterior to the right atrium, large right pleural effusion    I&O's Summary    10 Oct 2020 07:01  -  11 Oct 2020 07:00  --------------------------------------------------------  IN: 0 mL / OUT: 1590 mL / NET: -1590 mL    11 Oct 2020 07:01  -  11 Oct 2020 12:48  --------------------------------------------------------  IN: 200 mL / OUT: 0 mL / NET: 200 mL        PAST MEDICAL & SURGICAL HISTORY:  Ascites    Cirrhosis    DM (diabetes mellitus)    CHF (congestive heart failure)    HTN (hypertension)    S/P hernia repair        penicillin (Other)    MEDICATIONS  (STANDING):  albumin human 25% IVPB 100 milliLiter(s) IV Intermittent every 8 hours  ATENolol  Tablet 50 milliGRAM(s) Oral two times a day  cloNIDine 0.2 milliGRAM(s) Oral two times a day  dextrose 5%. 1000 milliLiter(s) (50 mL/Hr) IV Continuous <Continuous>  dextrose 50% Injectable 12.5 Gram(s) IV Push once  dextrose 50% Injectable 25 Gram(s) IV Push once  dextrose 50% Injectable 25 Gram(s) IV Push once  furosemide   Injectable 40 milliGRAM(s) IV Push two times a day  heparin   Injectable 5000 Unit(s) SubCutaneous every 12 hours  influenza   Vaccine 0.5 milliLiter(s) IntraMuscular once  insulin glargine Injectable (LANTUS) 13 Unit(s) SubCutaneous at bedtime  insulin lispro (HumaLOG) corrective regimen sliding scale   SubCutaneous three times a day before meals  lidocaine   Patch 1 Patch Transdermal daily  predniSONE   Tablet 2.5 milliGRAM(s) Oral daily  spironolactone 25 milliGRAM(s) Oral daily    MEDICATIONS  (PRN):  acetaminophen   Tablet .. 650 milliGRAM(s) Oral every 6 hours PRN Temp greater or equal to 38C (100.4F), Mild Pain (1 - 3)  dextrose 40% Gel 15 Gram(s) Oral once PRN Blood Glucose LESS THAN 70 milliGRAM(s)/deciliter  glucagon  Injectable 1 milliGRAM(s) IntraMuscular once PRN Glucose LESS THAN 70 milligrams/deciliter  oxycodone    5 mG/acetaminophen 325 mG 1 Tablet(s) Oral every 4 hours PRN Moderate Pain (4 - 6)      Vital Signs Last 24 Hrs  T(C): 36.4 (11 Oct 2020 09:53), Max: 36.5 (11 Oct 2020 05:59)  T(F): 97.5 (11 Oct 2020 09:53), Max: 97.7 (11 Oct 2020 05:59)  HR: 62 (11 Oct 2020 09:53) (58 - 65)  BP: 106/68 (11 Oct 2020 09:53) (106/68 - 160/84)  BP(mean): --  RR: 18 (11 Oct 2020 09:53) (16 - 20)  SpO2: 97% (11 Oct 2020 09:53) (94% - 100%)  ICU Vital Signs Last 24 Hrs    PHYSICAL EXAM:  General: Appears well developed, well nourished alert and cooperative.  HEENT: Head; normocephalic, atraumatic.Pupils reactive, cornea wnl. Neck supple, no nodes adenopathy, no JVD  CARDIOVASCULAR: Normal S1 and S2, 2/6 MISTI, no rub, gallop or lift.   LUNGS: No rales, rhonchi or wheeze. Normal breath sounds bilaterally.  ABDOMEN: Soft, nontender without mass or organomegaly. bowel sounds normoactive.  EXTREMITIES: No clubbing, cyanosis, (+) edema.   SKIN: warm and dry with normal turgor.  NEURO: Alert/oriented x 3/normal motor exam.   PSYCH: normal affect.        LABS:                        11.2   8.20  )-----------( 133      ( 11 Oct 2020 08:26 )             34.2     10-11    133<L>  |  99  |  69.0<H>  ----------------------------<  260<H>  4.6   |  20.0<L>  |  2.44<H>    Ca    8.2<L>      11 Oct 2020 08:26  Phos  4.0     10-10  Mg     1.9     10-10    TPro  5.4<L>  /  Alb  2.3<L>  /  TBili  0.5  /  DBili  x   /  AST  23  /  ALT  13  /  AlkPhos  113  10-11    PT/INR - ( 11 Oct 2020 08:26 )   PT: 11.6 sec;   INR: 1.00 ratio          ASSESSMENT AND PLAN:  Ms Jean-Baptiste is an 86 y/o woman with hyperlipidemia, type 2 diabetes mellitus, psoriatic arthritis and rheumatoid arthritis, pulmonary hypertension, cirrhosis of liver, chronic large right pleural effusion presumed to be related to her cirrhosis who presents with fall and concerns related to progressively worsening shortness of breath 3 days duration.   CT chest with large right pleural effusion with left shift with complete collapse of the right lung     Dyspnea: likely related to her large pleural effusion/cirrhosis   - s/p thoracentesis/chest tube placement given her tension pleural effusion    - recent echocardiogram with normal biventricular function without significant valvular dysfunction   - continue spironolactone 25mg   - continue IV lasix with strict I/O; hypervolemic secondary to cirrhosis  - goal K>4 and Mg>2   - telemetry monitoring     Thank you for allowing [facility] to participate in the care of this patient.  Please feel free to call with any questions or concerns. [facility], [facility]                                   [address] [place] [address]                                                      PHONE: [phone]                                                         FAX: [phone]  [email or web address]  ---------------------------------------------------------------------------------------------------------------------------------    Overnight events/patient complaints: continues to report feeling markedly improved and presently only concern relates to discomfort at chest tube site     INTERPRETATION OF TELEMETRY (personally reviewed):    ECG: < from: 12 Lead ECG (10.09.20 @ 01:07) >  Normal sinus rhythm with multiple and consecutive APC's  Anteroseptal infarct , age undetermined  Abnormal ECG    ECHO FINDINGS: 8/20/2020 LVEF normal 60% normal RV size and contractility mild MAC mild MR aortic sclerosis without stenosis moderate TR estimated RVSP 45 mmHg, minimal pericardial effusion anterior to the right atrium, large right pleural effusion    I&O's Summary    10 Oct 2020 07:01  -  11 Oct 2020 07:00  --------------------------------------------------------  IN: 0 mL / OUT: 1590 mL / NET: -1590 mL    11 Oct 2020 07:01  -  11 Oct 2020 12:48  --------------------------------------------------------  IN: 200 mL / OUT: 0 mL / NET: 200 mL        PAST MEDICAL & SURGICAL HISTORY:  Ascites    Cirrhosis    DM (diabetes mellitus)    CHF (congestive heart failure)    HTN (hypertension)    S/P hernia repair        penicillin (Other)    MEDICATIONS  (STANDING):  albumin human 25% IVPB 100 milliLiter(s) IV Intermittent every 8 hours  ATENolol  Tablet 50 milliGRAM(s) Oral two times a day  cloNIDine 0.2 milliGRAM(s) Oral two times a day  dextrose 5%. 1000 milliLiter(s) (50 mL/Hr) IV Continuous <Continuous>  dextrose 50% Injectable 12.5 Gram(s) IV Push once  dextrose 50% Injectable 25 Gram(s) IV Push once  dextrose 50% Injectable 25 Gram(s) IV Push once  furosemide   Injectable 40 milliGRAM(s) IV Push two times a day  heparin   Injectable 5000 Unit(s) SubCutaneous every 12 hours  influenza   Vaccine 0.5 milliLiter(s) IntraMuscular once  insulin glargine Injectable (LANTUS) 13 Unit(s) SubCutaneous at bedtime  insulin lispro (HumaLOG) corrective regimen sliding scale   SubCutaneous three times a day before meals  lidocaine   Patch 1 Patch Transdermal daily  predniSONE   Tablet 2.5 milliGRAM(s) Oral daily  spironolactone 25 milliGRAM(s) Oral daily    MEDICATIONS  (PRN):  acetaminophen   Tablet .. 650 milliGRAM(s) Oral every 6 hours PRN Temp greater or equal to 38C (100.4F), Mild Pain (1 - 3)  dextrose 40% Gel 15 Gram(s) Oral once PRN Blood Glucose LESS THAN 70 milliGRAM(s)/deciliter  glucagon  Injectable 1 milliGRAM(s) IntraMuscular once PRN Glucose LESS THAN 70 milligrams/deciliter  oxycodone    5 mG/acetaminophen 325 mG 1 Tablet(s) Oral every 4 hours PRN Moderate Pain (4 - 6)      Vital Signs Last 24 Hrs  T(C): 36.4 (11 Oct 2020 09:53), Max: 36.5 (11 Oct 2020 05:59)  T(F): 97.5 (11 Oct 2020 09:53), Max: 97.7 (11 Oct 2020 05:59)  HR: 62 (11 Oct 2020 09:53) (58 - 65)  BP: 106/68 (11 Oct 2020 09:53) (106/68 - 160/84)  BP(mean): --  RR: 18 (11 Oct 2020 09:53) (16 - 20)  SpO2: 97% (11 Oct 2020 09:53) (94% - 100%)  ICU Vital Signs Last 24 Hrs    PHYSICAL EXAM:  General: Appears well developed, well nourished alert and cooperative.  HEENT: Head; normocephalic, atraumatic.Pupils reactive, cornea wnl. Neck supple, no nodes adenopathy, no JVD  CARDIOVASCULAR: Normal S1 and S2, 2/6 MISTI, no rub, gallop or lift.   LUNGS: No rales, rhonchi or wheeze. Normal breath sounds bilaterally.  ABDOMEN: Soft, nontender, distended   EXTREMITIES: No clubbing, cyanosis, (+) edema.   SKIN: warm and dry with normal turgor.  NEURO: Alert/oriented x 3/normal motor exam.   PSYCH: normal affect.        LABS:                        11.2   8.20  )-----------( 133      ( 11 Oct 2020 08:26 )             34.2     10-11    133<L>  |  99  |  69.0<H>  ----------------------------<  260<H>  4.6   |  20.0<L>  |  2.44<H>    Ca    8.2<L>      11 Oct 2020 08:26  Phos  4.0     10-10  Mg     1.9     10-10    TPro  5.4<L>  /  Alb  2.3<L>  /  TBili  0.5  /  DBili  x   /  AST  23  /  ALT  13  /  AlkPhos  113  10-11    PT/INR - ( 11 Oct 2020 08:26 )   PT: 11.6 sec;   INR: 1.00 ratio          ASSESSMENT AND PLAN:  Ms Jean-Baptiste is an 84 y/o woman with hyperlipidemia, type 2 diabetes mellitus, psoriatic arthritis and rheumatoid arthritis, pulmonary hypertension, cirrhosis of liver, chronic large right pleural effusion presumed to be related to her cirrhosis who presents with fall and concerns related to progressively worsening shortness of breath 3 days duration.   CT chest with large right pleural effusion with left shift with complete collapse of the right lung     Dyspnea: likely related to her large pleural effusion/cirrhosis   - s/p thoracentesis/chest tube placement given her tension pleural effusion    - recent echocardiogram with normal biventricular function without significant valvular dysfunction   - continue spironolactone 25mg   - continue IV lasix with strict I/O; hypervolemic secondary to cirrhosis  - planned for albumin and likely eventual paracentesis   - renal indices noted, renal following   - goal K>4 and Mg>2   - telemetry monitoring     Thank you for allowing [facility] to participate in the care of this patient.  Please feel free to call with any questions or concerns.

## 2020-10-11 NOTE — PATIENT PROFILE ADULT - FALLEN IN THE PAST
Change of shift report given to GiovanaRN (oncoming Nurse) by Lenin Pierre RN(outgoing Nurse). Information was given on SBAR, Kardex and MAR. Patient was sleeping comfortably during my shift. Patient did not complain of any pain during my shift. All scheduled medications have been given, see MAR. Patient is on bedrest.  Patient teaching and routine rounding has been done. IV has been flushed and patent. yes

## 2020-10-12 DIAGNOSIS — N17.9 ACUTE KIDNEY FAILURE, UNSPECIFIED: ICD-10-CM

## 2020-10-12 DIAGNOSIS — K74.60 UNSPECIFIED CIRRHOSIS OF LIVER: ICD-10-CM

## 2020-10-12 DIAGNOSIS — Z51.5 ENCOUNTER FOR PALLIATIVE CARE: ICD-10-CM

## 2020-10-12 LAB
ALBUMIN SERPL ELPH-MCNC: 3.2 G/DL — LOW (ref 3.3–5.2)
ALP SERPL-CCNC: 106 U/L — SIGNIFICANT CHANGE UP (ref 40–120)
ALT FLD-CCNC: 10 U/L — SIGNIFICANT CHANGE UP
ANION GAP SERPL CALC-SCNC: 12 MMOL/L — SIGNIFICANT CHANGE UP (ref 5–17)
AST SERPL-CCNC: 21 U/L — SIGNIFICANT CHANGE UP
BILIRUB SERPL-MCNC: 0.5 MG/DL — SIGNIFICANT CHANGE UP (ref 0.4–2)
BUN SERPL-MCNC: 76 MG/DL — HIGH (ref 8–20)
CALCIUM SERPL-MCNC: 8.6 MG/DL — SIGNIFICANT CHANGE UP (ref 8.6–10.2)
CHLORIDE SERPL-SCNC: 98 MMOL/L — SIGNIFICANT CHANGE UP (ref 98–107)
CO2 SERPL-SCNC: 23 MMOL/L — SIGNIFICANT CHANGE UP (ref 22–29)
COLLECT DURATION TIME UR: 24 HR — SIGNIFICANT CHANGE UP
CREAT SERPL-MCNC: 2.28 MG/DL — HIGH (ref 0.5–1.3)
GLUCOSE BLDC GLUCOMTR-MCNC: 204 MG/DL — HIGH (ref 70–99)
GLUCOSE BLDC GLUCOMTR-MCNC: 231 MG/DL — HIGH (ref 70–99)
GLUCOSE BLDC GLUCOMTR-MCNC: 267 MG/DL — HIGH (ref 70–99)
GLUCOSE BLDC GLUCOMTR-MCNC: 273 MG/DL — HIGH (ref 70–99)
GLUCOSE SERPL-MCNC: 200 MG/DL — HIGH (ref 70–99)
HCT VFR BLD CALC: 31.6 % — LOW (ref 34.5–45)
HGB BLD-MCNC: 10.4 G/DL — LOW (ref 11.5–15.5)
INR BLD: 1.05 RATIO — SIGNIFICANT CHANGE UP (ref 0.88–1.16)
MCHC RBC-ENTMCNC: 31 PG — SIGNIFICANT CHANGE UP (ref 27–34)
MCHC RBC-ENTMCNC: 32.9 GM/DL — SIGNIFICANT CHANGE UP (ref 32–36)
MCV RBC AUTO: 94 FL — SIGNIFICANT CHANGE UP (ref 80–100)
NON-GYNECOLOGICAL CYTOLOGY STUDY: SIGNIFICANT CHANGE UP
PLATELET # BLD AUTO: 116 K/UL — LOW (ref 150–400)
POTASSIUM SERPL-MCNC: 4.6 MMOL/L — SIGNIFICANT CHANGE UP (ref 3.5–5.3)
POTASSIUM SERPL-SCNC: 4.6 MMOL/L — SIGNIFICANT CHANGE UP (ref 3.5–5.3)
PROT 24H UR-MRATE: 2129 MG/24HR — HIGH (ref 50–100)
PROT SERPL-MCNC: 5.6 G/DL — LOW (ref 6.6–8.7)
PROTHROM AB SERPL-ACNC: 12.1 SEC — SIGNIFICANT CHANGE UP (ref 10.6–13.6)
RBC # BLD: 3.36 M/UL — LOW (ref 3.8–5.2)
RBC # FLD: 14.1 % — SIGNIFICANT CHANGE UP (ref 10.3–14.5)
SODIUM SERPL-SCNC: 133 MMOL/L — LOW (ref 135–145)
TOTAL VOLUME - 24 HOUR: 1275 ML — SIGNIFICANT CHANGE UP
URINE CREATININE CALCULATION: 0.7 G/24 HR — LOW (ref 0.8–1.8)
WBC # BLD: 6.1 K/UL — SIGNIFICANT CHANGE UP (ref 3.8–10.5)
WBC # FLD AUTO: 6.1 K/UL — SIGNIFICANT CHANGE UP (ref 3.8–10.5)

## 2020-10-12 PROCEDURE — 99223 1ST HOSP IP/OBS HIGH 75: CPT

## 2020-10-12 PROCEDURE — 99233 SBSQ HOSP IP/OBS HIGH 50: CPT

## 2020-10-12 PROCEDURE — 71045 X-RAY EXAM CHEST 1 VIEW: CPT | Mod: 26

## 2020-10-12 PROCEDURE — 76775 US EXAM ABDO BACK WALL LIM: CPT | Mod: 26

## 2020-10-12 PROCEDURE — 99232 SBSQ HOSP IP/OBS MODERATE 35: CPT

## 2020-10-12 RX ORDER — DILTIAZEM HCL 120 MG
15 CAPSULE, EXT RELEASE 24 HR ORAL ONCE
Refills: 0 | Status: DISCONTINUED | OUTPATIENT
Start: 2020-10-12 | End: 2020-10-12

## 2020-10-12 RX ORDER — INSULIN GLARGINE 100 [IU]/ML
16 INJECTION, SOLUTION SUBCUTANEOUS AT BEDTIME
Refills: 0 | Status: DISCONTINUED | OUTPATIENT
Start: 2020-10-12 | End: 2020-10-13

## 2020-10-12 RX ORDER — HYDRALAZINE HCL 50 MG
10 TABLET ORAL ONCE
Refills: 0 | Status: COMPLETED | OUTPATIENT
Start: 2020-10-12 | End: 2020-10-12

## 2020-10-12 RX ADMIN — Medication 0.2 MILLIGRAM(S): at 17:49

## 2020-10-12 RX ADMIN — Medication 3: at 09:14

## 2020-10-12 RX ADMIN — Medication 50 MILLILITER(S): at 23:41

## 2020-10-12 RX ADMIN — LIDOCAINE 1 PATCH: 4 CREAM TOPICAL at 23:40

## 2020-10-12 RX ADMIN — LIDOCAINE 1 PATCH: 4 CREAM TOPICAL at 19:00

## 2020-10-12 RX ADMIN — Medication 50 MILLILITER(S): at 06:54

## 2020-10-12 RX ADMIN — Medication 650 MILLIGRAM(S): at 13:57

## 2020-10-12 RX ADMIN — Medication 40 MILLIGRAM(S): at 06:54

## 2020-10-12 RX ADMIN — Medication 2.5 MILLIGRAM(S): at 07:00

## 2020-10-12 RX ADMIN — Medication 2: at 12:57

## 2020-10-12 RX ADMIN — LIDOCAINE 1 PATCH: 4 CREAM TOPICAL at 11:25

## 2020-10-12 RX ADMIN — Medication 50 MILLILITER(S): at 16:50

## 2020-10-12 RX ADMIN — Medication 650 MILLIGRAM(S): at 12:57

## 2020-10-12 RX ADMIN — Medication 10 MILLIGRAM(S): at 22:51

## 2020-10-12 RX ADMIN — Medication 40 MILLIGRAM(S): at 17:49

## 2020-10-12 RX ADMIN — Medication 3: at 17:49

## 2020-10-12 RX ADMIN — Medication 0.2 MILLIGRAM(S): at 07:00

## 2020-10-12 RX ADMIN — INSULIN GLARGINE 16 UNIT(S): 100 INJECTION, SOLUTION SUBCUTANEOUS at 22:17

## 2020-10-12 RX ADMIN — SPIRONOLACTONE 25 MILLIGRAM(S): 25 TABLET, FILM COATED ORAL at 07:00

## 2020-10-12 RX ADMIN — HEPARIN SODIUM 5000 UNIT(S): 5000 INJECTION INTRAVENOUS; SUBCUTANEOUS at 11:25

## 2020-10-12 RX ADMIN — ATENOLOL 50 MILLIGRAM(S): 25 TABLET ORAL at 17:49

## 2020-10-12 RX ADMIN — HEPARIN SODIUM 5000 UNIT(S): 5000 INJECTION INTRAVENOUS; SUBCUTANEOUS at 22:16

## 2020-10-12 RX ADMIN — ATENOLOL 50 MILLIGRAM(S): 25 TABLET ORAL at 07:00

## 2020-10-12 NOTE — PROGRESS NOTE ADULT - SUBJECTIVE AND OBJECTIVE BOX
[facility], [facility]                                   [address] [place] [address]                                                      PHONE: [phone]                                                         FAX: [phone]  [email or web address]  ---------------------------------------------------------------------------------------------------------------------------------    Overnight events/patient complaints: patient seen at bedside. She feels well this morning, denies chest pain or SOB.       penicillin (Other)    MEDICATIONS  (STANDING):  albumin human 25% IVPB 100 milliLiter(s) IV Intermittent every 8 hours  ATENolol  Tablet 50 milliGRAM(s) Oral two times a day  cloNIDine 0.2 milliGRAM(s) Oral two times a day  dextrose 5%. 1000 milliLiter(s) (50 mL/Hr) IV Continuous <Continuous>  dextrose 50% Injectable 12.5 Gram(s) IV Push once  dextrose 50% Injectable 25 Gram(s) IV Push once  dextrose 50% Injectable 25 Gram(s) IV Push once  furosemide   Injectable 40 milliGRAM(s) IV Push two times a day  heparin   Injectable 5000 Unit(s) SubCutaneous every 12 hours  influenza   Vaccine 0.5 milliLiter(s) IntraMuscular once  insulin glargine Injectable (LANTUS) 13 Unit(s) SubCutaneous at bedtime  insulin lispro (HumaLOG) corrective regimen sliding scale   SubCutaneous three times a day before meals  lidocaine   Patch 1 Patch Transdermal daily  predniSONE   Tablet 2.5 milliGRAM(s) Oral daily  spironolactone 25 milliGRAM(s) Oral daily    MEDICATIONS  (PRN):  acetaminophen   Tablet .. 650 milliGRAM(s) Oral every 6 hours PRN Temp greater or equal to 38C (100.4F), Mild Pain (1 - 3)  dextrose 40% Gel 15 Gram(s) Oral once PRN Blood Glucose LESS THAN 70 milliGRAM(s)/deciliter  glucagon  Injectable 1 milliGRAM(s) IntraMuscular once PRN Glucose LESS THAN 70 milligrams/deciliter  oxycodone    5 mG/acetaminophen 325 mG 1 Tablet(s) Oral every 4 hours PRN Moderate Pain (4 - 6)      Vital Signs Last 24 Hrs  T(C): 36.6 (12 Oct 2020 08:55), Max: 36.6 (11 Oct 2020 16:30)  T(F): 97.8 (12 Oct 2020 08:55), Max: 97.9 (11 Oct 2020 16:30)  HR: 60 (12 Oct 2020 08:55) (60 - 69)  BP: 153/73 (12 Oct 2020 08:55) (106/68 - 174/83)  BP(mean): 100 (12 Oct 2020 08:55) (100 - 100)  RR: 17 (12 Oct 2020 08:55) (17 - 18)  SpO2: 97% (12 Oct 2020 08:55) (96% - 97%)  ICU Vital Signs Last 24 Hrs  BHAVIK BLANDON  I&O's Detail    11 Oct 2020 07:01  -  12 Oct 2020 07:00  --------------------------------------------------------  IN:    IV PiggyBack: 100 mL    Oral Fluid: 380 mL  Total IN: 480 mL    OUT:    Chest Tube (mL): 1098 mL    Voided (mL): 750 mL  Total OUT: 1848 mL    Total NET: -1368 mL        Drug Dosing Weight  BHAVIK JAMIA      PHYSICAL EXAM:  General: Appears well developed, well nourished alert and cooperative.  HEENT: Head; normocephalic, atraumatic.  Eyes: Pupils reactive, cornea wnl.  Neck: Supple, no nodes adenopathy, no NVD or carotid bruit or thyromegaly.  CARDIOVASCULAR: Normal S1 and S2, No murmur, rub, gallop or lift.   LUNGS: No rales, rhonchi or wheeze. Normal breath sounds bilaterally.  ABDOMEN: Soft, nontender without mass or organomegaly. bowel sounds normoactive.  EXTREMITIES: No clubbing, cyanosis or edema. Distal pulses wnl.   SKIN: warm and dry with normal turgor.  NEURO: Alert/oriented x 3/normal motor exam. No pathologic reflexes.    PSYCH: normal affect.        LABS:                        10.4   6.10  )-----------( 116      ( 12 Oct 2020 06:32 )             31.6     10-12    133<L>  |  98  |  76.0<H>  ----------------------------<  200<H>  4.6   |  23.0  |  2.28<H>    Ca    8.6      12 Oct 2020 06:32    TPro  5.6<L>  /  Alb  3.2<L>  /  TBili  0.5  /  DBili  x   /  AST  21  /  ALT  10  /  AlkPhos  106  10-12    BHAVIK BLANDON      PT/INR - ( 12 Oct 2020 06:32 )   PT: 12.1 sec;   INR: 1.05 ratio               RADIOLOGY & ADDITIONAL STUDIES:    INTERPRETATION OF TELEMETRY (personally reviewed): sinus rhythm     ASSESSMENT AND PLAN:  Ms Blandon is an 84 y/o woman with hyperlipidemia, type 2 diabetes mellitus, psoriatic arthritis and rheumatoid arthritis, pulmonary hypertension, cirrhosis of liver, chronic large right pleural effusion presumed to be related to her cirrhosis who presents with fall and concerns related to progressively worsening shortness of breath 3 days duration.   CT chest with large right pleural effusion with left shift with complete collapse of the right lung     Dyspnea, Large Right Pleural Effusion, Pulmonary HTN, Acute on Chronic Renal Failure -- s/p thoracentesis/chest tube placement with improvement in respiratory status. Recent echocardiogram showed normal biventricular function without significant valvular dysfunction.  - continue spironolactone 25mg   - continue IV lasix 40 mg twice daily for now -- hopeful transition to PO in the next 24-48 hours  - daily BMP while on high dose IV diuretic to monitor for worsening renal toxicity   - f/u nephrology consults for diuretic management   - goal K>4 and Mg>2   - telemetry monitoring     Will follow with you.

## 2020-10-12 NOTE — PROGRESS NOTE ADULT - SUBJECTIVE AND OBJECTIVE BOX
PULMONARY PROGRESS NOTE      NEO BLANDON-[id number]    Patient is a 85y old  Female who presents with a chief complaint of right Pleural effusion (12 Oct 2020 09:42)      INTERVAL HPI/OVERNIGHT EVENTS:Comfortable after pigtail drainage.    MEDICATIONS  (STANDING):  albumin human 25% IVPB 100 milliLiter(s) IV Intermittent every 8 hours  ATENolol  Tablet 50 milliGRAM(s) Oral two times a day  cloNIDine 0.2 milliGRAM(s) Oral two times a day  dextrose 5%. 1000 milliLiter(s) (50 mL/Hr) IV Continuous <Continuous>  dextrose 50% Injectable 12.5 Gram(s) IV Push once  dextrose 50% Injectable 25 Gram(s) IV Push once  dextrose 50% Injectable 25 Gram(s) IV Push once  furosemide   Injectable 40 milliGRAM(s) IV Push two times a day  heparin   Injectable 5000 Unit(s) SubCutaneous every 12 hours  influenza   Vaccine 0.5 milliLiter(s) IntraMuscular once  insulin glargine Injectable (LANTUS) 13 Unit(s) SubCutaneous at bedtime  insulin lispro (HumaLOG) corrective regimen sliding scale   SubCutaneous three times a day before meals  lidocaine   Patch 1 Patch Transdermal daily  predniSONE   Tablet 2.5 milliGRAM(s) Oral daily  spironolactone 25 milliGRAM(s) Oral daily      MEDICATIONS  (PRN):  acetaminophen   Tablet .. 650 milliGRAM(s) Oral every 6 hours PRN Temp greater or equal to 38C (100.4F), Mild Pain (1 - 3)  dextrose 40% Gel 15 Gram(s) Oral once PRN Blood Glucose LESS THAN 70 milliGRAM(s)/deciliter  glucagon  Injectable 1 milliGRAM(s) IntraMuscular once PRN Glucose LESS THAN 70 milligrams/deciliter  oxycodone    5 mG/acetaminophen 325 mG 1 Tablet(s) Oral every 4 hours PRN Moderate Pain (4 - 6)      Allergies    penicillin (Other)    Intolerances        PAST MEDICAL & SURGICAL HISTORY:  Ascites    Cirrhosis    DM (diabetes mellitus)    CHF (congestive heart failure)    HTN (hypertension)    S/P hernia repair        SOCIAL HISTORY  Smoking History:       REVIEW OF SYSTEMS:    CONSTITUTIONAL:  No distress    HEENT:  Eyes:  No diplopia or blurred vision. ENT:  No earache, sore throat or runny nose.    CARDIOVASCULAR:  No pressure, squeezing, tightness, heaviness or aching about the chest; no palpitations.    RESPIRATORY:  per hpi    GASTROINTESTINAL:  No nausea, vomiting or diarrhea.    GENITOURINARY:  No dysuria, frequency or urgency.    MUSCULOSKELETAL:  No joint pain    SKIN:  No new lesions.    NEUROLOGIC:  No paresthesias, fasciculations, seizures or weakness.    PSYCHIATRIC:  No disorder of thought or mood.    ENDOCRINE:  No heat or cold intolerance, polyuria or polydipsia.    HEMATOLOGICAL:  No easy bruising or bleeding.     Vital Signs Last 24 Hrs  T(C): 36.6 (12 Oct 2020 08:55), Max: 36.6 (11 Oct 2020 16:30)  T(F): 97.8 (12 Oct 2020 08:55), Max: 97.9 (11 Oct 2020 16:30)  HR: 60 (12 Oct 2020 08:55) (60 - 69)  BP: 153/73 (12 Oct 2020 08:55) (150/77 - 174/83)  BP(mean): 100 (12 Oct 2020 08:55) (100 - 100)  RR: 17 (12 Oct 2020 08:55) (17 - 18)  SpO2: 97% (12 Oct 2020 08:55) (96% - 97%)    PHYSICAL EXAMINATION:    GENERAL: The patient is awake and alert in no apparent distress.     HEENT: Head is normocephalic and atraumatic. Extraocular muscles are intact. Mucous membranes are moist.    NECK: Supple.    LUNGS: diminished bs Rt, no dullness.  HEART: Regular rate and rhythm without murmur.    ABDOMEN: Soft, nontender, and nondistended.      EXTREMITIES: Without any cyanosis, clubbing, rash, lesions or edema.    NEUROLOGIC: Grossly intact.    SKIN: No ulceration or induration present.      LABS:                        10.4   6.10  )-----------( 116      ( 12 Oct 2020 06:32 )             31.6     10-12    133<L>  |  98  |  76.0<H>  ----------------------------<  200<H>  4.6   |  23.0  |  2.28<H>    Ca    8.6      12 Oct 2020 06:32    TPro  5.6<L>  /  Alb  3.2<L>  /  TBili  0.5  /  DBili  x   /  AST  21  /  ALT  10  /  AlkPhos  106  10-12    PT/INR - ( 12 Oct 2020 06:32 )   PT: 12.1 sec;   INR: 1.05 ratio                             MICROBIOLOGY:    RADIOLOGY & ADDITIONAL STUDIES:< from: CT Chest No Cont (10.11.20 @ 15:22) >    Impression: Right hydropneumothorax with a pigtail catheter in place.    < end of copied text >

## 2020-10-12 NOTE — PROGRESS NOTE ADULT - ASSESSMENT
85 female with R effusion with multiple possible etiologies including CHF, cirrhosis, malnutrition, ? underlying malignancy.  Pneumothorax on CXR likely trapped lung.  Daily CXR.  CT Chest as per pulmonology.  10/12 Maintain R pigtail waterseal

## 2020-10-12 NOTE — PROGRESS NOTE ADULT - SUBJECTIVE AND OBJECTIVE BOX
Subjective:  "Hello"  OOB chair this am      V/S  T(C): 36.4 (10-12-20 @ 11:00), Max: 36.6 (10-11-20 @ 16:30)  HR: 56 (10-12-20 @ 11:00) (56 - 69)  BP: 161/77 (10-12-20 @ 11:00) (150/77 - 174/83)  RR: 18 (10-12-20 @ 11:00) (17 - 18)  SpO2: 98% (10-12-20 @ 11:00) (96% - 98%)                                              Tele:  SR   60s    CHEST TUBE:   R pigtail waterseal> 700 ml serosang                      AIR LEAKS:  [ ] YES [x ] NO      MEDICATIONS  (STANDING):  albumin human 25% IVPB 100 milliLiter(s) IV Intermittent every 8 hours  ATENolol  Tablet 50 milliGRAM(s) Oral two times a day  cloNIDine 0.2 milliGRAM(s) Oral two times a day  dextrose 5%. 1000 milliLiter(s) (50 mL/Hr) IV Continuous <Continuous>  dextrose 50% Injectable 12.5 Gram(s) IV Push once  dextrose 50% Injectable 25 Gram(s) IV Push once  dextrose 50% Injectable 25 Gram(s) IV Push once  furosemide   Injectable 40 milliGRAM(s) IV Push two times a day  heparin   Injectable 5000 Unit(s) SubCutaneous every 12 hours  influenza   Vaccine 0.5 milliLiter(s) IntraMuscular once  insulin glargine Injectable (LANTUS) 16 Unit(s) SubCutaneous at bedtime  insulin lispro (HumaLOG) corrective regimen sliding scale   SubCutaneous three times a day before meals  lidocaine   Patch 1 Patch Transdermal daily  predniSONE   Tablet 2.5 milliGRAM(s) Oral daily  spironolactone 25 milliGRAM(s) Oral daily      10-12    133<L>  |  98  |  76.0<H>  ----------------------------<  200<H>  4.6   |  23.0  |  2.28<H>    Ca    8.6      12 Oct 2020 06:32    TPro  5.6<L>  /  Alb  3.2<L>  /  TBili  0.5  /  DBili  x   /  AST  21  /  ALT  10  /  AlkPhos  106  10-12                               10.4   6.10  )-----------( 116      ( 12 Oct 2020 06:32 )             31.6        PT/INR - ( 12 Oct 2020 06:32 )   PT: 12.1 sec;   INR: 1.05 ratio                  CAPILLARY BLOOD GLUCOSE      POCT Blood Glucose.: 267 mg/dL (12 Oct 2020 08:30)  POCT Blood Glucose.: 247 mg/dL (11 Oct 2020 21:00)  POCT Blood Glucose.: 263 mg/dL (11 Oct 2020 17:19)  POCT Blood Glucose.: 215 mg/dL (11 Oct 2020 12:17)           CXR:  < from: Xray Chest 1 View- PORTABLE-Routine (Xray Chest 1 View- PORTABLE-Routine in AM.) (10.12.20 @ 05:56) >  Again noted is a right pigtail chest tube. There is a large right pneumothorax occupying the majority of the right hemithorax. No evidence of mediastinal shift. The heart is not enlarged. No hilar or mediastinal abnormality. The left lung is clear..    < end of copied text >        Physical Exam:    CONSTITUTIONAL: Elderly female, frail, in NAD, pleasant.    RESPIRATORY: Decreased breath sounds R lungR pigtail to waterseal serosang drainage    CARDIOVASCULAR: RRR, S1/S2+,    ABDOMEN: Mildly distended abdomen, non-tender, BS+    Ext  no edema              PAST MEDICAL & SURGICAL HISTORY:  Ascites    Cirrhosis    DM (diabetes mellitus)    CHF (congestive heart failure)    HTN (hypertension)    S/P hernia repair           Had third ECT treatment today. Evalauted pt. a few hrs after treatment. He was awake/alert/orientedx3; reported some improvement in mood; expressed considerable anxiety about the fact that he is in an IP hosp.Noted that the major reason for high BP was severe anxiety precipitated by being in an IP psych environment. Pt. Had third ECT treatment today. Evalauted pt. a few hrs after treatment. He was awake/alert/orientedx3; reported some improvement in mood; expressed considerable anxiety about the fact that he is in an IP hosp.Noted that the major reason for high BP was severe anxiety precipitated by being in an IP psych environment. Pt. denied SI/HI/AH/VH; no delusions verbalized. He had put in a 72h letter on 7/8 at 5PM.  and writer had a detailed discussion about pt's request to leave and encouraged him to stay Had third ECT treatment today. Evalauted pt. a few hrs after treatment. He was awake/alert/orientedx3; reported some improvement in mood; expressed considerable anxiety about the fact that he is in an IP hosp.Noted that the major reason for high BP was severe anxiety precipitated by being in an IP psych environment. Pt. denied SI/HI/AH/VH; no delusions verbalized. He had put in a 72h letter on 7/8 at 5PM.  and writer had a detailed discussion about pt's request to leave and encouraged him to stay. Pt. appreciated the rationale but requested that he be discharged.

## 2020-10-12 NOTE — PROGRESS NOTE ADULT - ASSESSMENT
Imp--Cirrhosis with Rt eff s/p drainage.  Persistent Rt hydropneumothorax.  Prob visceral pleural thickening, "ex vacuo" ptx.  Clinically with improved resp status.  Fluid will reaccumulate ultimately.  Consideration should be given to chronic drainage.

## 2020-10-12 NOTE — PROGRESS NOTE ADULT - ASSESSMENT
DEVYN on CKD suspect stage III  Cr 2.2 today improving   Baseline cr seems to be approx 1.8  Has h/o HTN, CHF, cirrhosis with ascites, HLD, AFib (no A/C)   DM poor control A1C 8.5  presented to ED s/p fall and SOB  Clinically hypervolemic 2/2 cirrosis  Large R leural effusion s/p CT   Agree w Lasix 40 mg BID / Aldactone 25 mg Q day  Plans for abd paracentesis after renal optimazation  Edema albumin will improve intavascualr volume  agree w coarse of IV albumin 2-3 days  Urine Na elevated  Renal sonogram - pending  24 hr urine study- ongoing  Cards/ Pulm/ GI evals notes    Will follow

## 2020-10-12 NOTE — CONSULT NOTE ADULT - PROBLEM SELECTOR RECOMMENDATION 5
Met with patient. She is a  and has three children and has been generally functionally independent  She is aware of her current condition and verbalized that she was told it was " terminal".  She states that she is not " ready to go".   Discussed advance directives- designating a health care proxy. Patient was not decisive as to primary proxy naming both her daughter Pippa and son.   Discussed code status. Although she stated she would not want such interventions, was not affirmative with a decision.   She agreed to allow me to speak to her daughter Pippa. Per Dr. Busch, Pippa brought up hospice . Plan to further discuss. Met with patient. She is a  and has three children and has been generally functionally independent  She is aware of her current condition and verbalized that she was told it was " terminal".  She states that she is not " ready to go".   Discussed advance directives- designating a health care proxy. Patient was not decisive as to primary proxy naming both her daughter Pippa and son.   Discussed code status. Although she stated she would not want such interventions, was not affirmative with a decision.   She agreed to allow me to speak to her daughter Pippa. Per Dr. Busch, Pippa brought up hospice . Called daughter - received voice mail

## 2020-10-12 NOTE — PROGRESS NOTE ADULT - PROBLEM SELECTOR PLAN 1
With hepatic hydrothorax. Renal consult if not already done. Continue current management for now. Repeat labs ordered for the AM.

## 2020-10-12 NOTE — PROGRESS NOTE ADULT - SUBJECTIVE AND OBJECTIVE BOX
[facility] Division of Hospital Medicine  Harlan Busch [phone]    Chief Complaint:  Patient is a 85y old  Female who presents with a chief complaint of right Pleural effusion (10 Oct 2020 13:17)      SUBJECTIVE / OVERNIGHT EVENTS:  Patient seen and examined at bedside. No acute events reported overnight. Patient reports feeling better. Still getting albumin. CXR shows persistent R hydropneumothorax.    Patient denies chest pain, abd pain, N/V, fever, chills, dysuria or any other complaints. All remainder ROS negative.     MEDICATIONS  (STANDING):  ATENolol  Tablet 50 milliGRAM(s) Oral two times a day  cloNIDine 0.2 milliGRAM(s) Oral two times a day  dextrose 5%. 1000 milliLiter(s) (50 mL/Hr) IV Continuous <Continuous>  dextrose 50% Injectable 12.5 Gram(s) IV Push once  dextrose 50% Injectable 25 Gram(s) IV Push once  dextrose 50% Injectable 25 Gram(s) IV Push once  furosemide   Injectable 40 milliGRAM(s) IV Push two times a day  heparin   Injectable 5000 Unit(s) SubCutaneous every 12 hours  insulin glargine Injectable (LANTUS) 10 Unit(s) SubCutaneous at bedtime  insulin lispro (HumaLOG) corrective regimen sliding scale   SubCutaneous three times a day before meals  lidocaine   Patch 1 Patch Transdermal daily  predniSONE   Tablet 2.5 milliGRAM(s) Oral daily  spironolactone 25 milliGRAM(s) Oral daily    MEDICATIONS  (PRN):  acetaminophen   Tablet .. 650 milliGRAM(s) Oral every 6 hours PRN Temp greater or equal to 38C (100.4F), Mild Pain (1 - 3)  dextrose 40% Gel 15 Gram(s) Oral once PRN Blood Glucose LESS THAN 70 milliGRAM(s)/deciliter  glucagon  Injectable 1 milliGRAM(s) IntraMuscular once PRN Glucose LESS THAN 70 milligrams/deciliter  oxycodone    5 mG/acetaminophen 325 mG 1 Tablet(s) Oral every 4 hours PRN Moderate Pain (4 - 6)        I&O's Summary    09 Oct 2020 07:01  -  10 Oct 2020 07:00  --------------------------------------------------------  IN: 0 mL / OUT: 5440 mL / NET: -5440 mL    10 Oct 2020 07:01  -  10 Oct 2020 14:09  --------------------------------------------------------  IN: 0 mL / OUT: 500 mL / NET: -500 mL        PHYSICAL EXAM:  Vital Signs Last 24 Hrs  T(C): 36.6 (10 Oct 2020 11:30), Max: 36.6 (10 Oct 2020 11:30)  T(F): 97.9 (10 Oct 2020 11:30), Max: 97.9 (10 Oct 2020 11:30)  HR: 60 (10 Oct 2020 11:30) (56 - 76)  BP: 102/61 (10 Oct 2020 11:30) (102/61 - 184/68)  BP(mean): --  RR: 18 (10 Oct 2020 11:30) (18 - 19)  SpO2: 97% (10 Oct 2020 11:30) (94% - 99%)        CONSTITUTIONAL: Elderly female, frail, in NAD, pleasant.  HEENT: NC/AT, PERRL, no JVD  RESPIRATORY: Decreased breath sounds R lung  CARDIOVASCULAR: RRR, S1/S2+, no m/g/r  ABDOMEN: Mildly distended abdomen, non-tender, BS+  MUSCLOSKELETAL: Trace pedal edema, no cyanosis or deformities.  PSYCH: A+O to person, place, and time; affect appropriate  NEUROLOGY: CN 2-12 are intact and symmetric; no gross neurological deficits.  SKIN: No rashes; no palpable lesions  VASC: Distal pulses palpable    LABS:                        12.0   8.91  )-----------( 135      ( 10 Oct 2020 03:17 )             37.1     10-10    137  |  101  |  61.0<H>  ----------------------------<  260<H>  5.1   |  25.0  |  1.99<H>    Ca    8.6      10 Oct 2020 03:17  Phos  4.0     10-10  Mg     1.9     10-10    TPro  6.8  /  Alb  2.8<L>  /  TBili  0.6  /  DBili  x   /  AST  35<H>  /  ALT  19  /  AlkPhos  163<H>  10-09    PT/INR - ( 09 Oct 2020 01:49 )   PT: 11.9 sec;   INR: 1.03 ratio         PTT - ( 09 Oct 2020 01:49 )  PTT:29.7 sec  CARDIAC MARKERS ( 09 Oct 2020 01:49 )  x     / 0.03 ng/mL / x     / x     / x          Urinalysis Basic - ( 09 Oct 2020 06:26 )    Color: Yellow / Appearance: Clear / S.010 / pH: x  Gluc: x / Ketone: Negative  / Bili: Negative / Urobili: Negative mg/dL   Blood: x / Protein: 100 mg/dL / Nitrite: Negative   Leuk Esterase: Negative / RBC: 11-25 /HPF / WBC 3-5   Sq Epi: x / Non Sq Epi: Occasional / Bacteria: Few        Culture - Body Fluid with Gram Stain (collected 09 Oct 2020 16:33)  Source: .Body Fluid Pleural Fluid  Gram Stain (09 Oct 2020 19:22):    No polymorphonuclear cells seen    No organisms seen    by cytocentrifuge  Preliminary Report (10 Oct 2020 12:47):    No growth      CAPILLARY BLOOD GLUCOSE      POCT Blood Glucose.: 273 mg/dL (10 Oct 2020 11:27)  POCT Blood Glucose.: 259 mg/dL (10 Oct 2020 08:12)  POCT Blood Glucose.: 221 mg/dL (09 Oct 2020 16:16)        RADIOLOGY & ADDITIONAL TESTS:  Results Reviewed:   Imaging Personally Reviewed:  Electrocardiogram Personally Reviewed:

## 2020-10-12 NOTE — CONSULT NOTE ADULT - SUBJECTIVE AND OBJECTIVE BOX
Palliative Medicine Initial Consultation Note    HPI:  85 year old F with PMH of HTN, CHF, cirrhosis with ascites, HLD, DM, AFib (no A/C) presented to ED s/p fall, reported as mechanical fall, tripped and fell, denies loss of consciousness or head injury. Patient stated she had worsening shortness of breath for the past 3 days. Patient was evaluated by her cardiologist Dr. Camp and pulmonologist Dr. Cotto on 08/2020, CXR outpatient which showed right sided pleural effusion and was recommended to get drained and was scheduled for follow up visit today 10/09. In the Ed pt was hypoxic, tachypneic and mild to moderate respiratory distress requiring 4L NC, CXR showed large pleural effusion, given Lasix 80mg IV push, evaluated by ct surgery for possible chest tube placement, pending Ct chest w/o contrast. Patient denies fevers/chills, dizziness, headache, chest pain, palpitation, cough, abd pain/n/v/d, urinary symptoms, recent travel or sick contact.     PERTINENT PMH REVIEWED:  [ x] YES [ ] NO         Afib     Ascites     CHF (congestive heart failure)     Cirrhosis     DM (diabetes mellitus)     HTN (hypertension).     PAST SURGICAL HISTORY:  S/P hernia repair.     FAMILY HISTORY:  No family history of diabetes mellitus.    SOCIAL HISTORY:  EtOH [ ] Yes  [x ] No                                    Drugs [ ] Yes x[ ] No                                   [ ] smoker [x ] nonsmoker                                    Admitted from: [x ] home [ ] SNF _________ [ ] [facility] ________    Surrogate/HCP/Guardian:  Daughter Pippa murray  FAMILY HISTORY:  No family history of diabetes mellitus        Baseline ADLs (prior to admission):  Independent [ ] moderately [ x] fully   Dependent   [ ] moderately [ ]fully    MEDICATIONS  (STANDING):  albumin human 25% IVPB 100 milliLiter(s) IV Intermittent every 8 hours  ATENolol  Tablet 50 milliGRAM(s) Oral two times a day  cloNIDine 0.2 milliGRAM(s) Oral two times a day  dextrose 5%. 1000 milliLiter(s) (50 mL/Hr) IV Continuous <Continuous>  dextrose 50% Injectable 12.5 Gram(s) IV Push once  dextrose 50% Injectable 25 Gram(s) IV Push once  dextrose 50% Injectable 25 Gram(s) IV Push once  furosemide   Injectable 40 milliGRAM(s) IV Push two times a day  heparin   Injectable 5000 Unit(s) SubCutaneous every 12 hours  influenza   Vaccine 0.5 milliLiter(s) IntraMuscular once  insulin glargine Injectable (LANTUS) 16 Unit(s) SubCutaneous at bedtime  insulin lispro (HumaLOG) corrective regimen sliding scale   SubCutaneous three times a day before meals  lidocaine   Patch 1 Patch Transdermal daily  predniSONE   Tablet 2.5 milliGRAM(s) Oral daily  spironolactone 25 milliGRAM(s) Oral daily    MEDICATIONS  (PRN):  acetaminophen   Tablet .. 650 milliGRAM(s) Oral every 6 hours PRN Temp greater or equal to 38C (100.4F), Mild Pain (1 - 3)  dextrose 40% Gel 15 Gram(s) Oral once PRN Blood Glucose LESS THAN 70 milliGRAM(s)/deciliter  glucagon  Injectable 1 milliGRAM(s) IntraMuscular once PRN Glucose LESS THAN 70 milligrams/deciliter  oxycodone    5 mG/acetaminophen 325 mG 1 Tablet(s) Oral every 4 hours PRN Moderate Pain (4 - 6)      Allergies    penicillin (Other)    Intolerances        REVIEW OF SYSTEMS     see HPI  [ ] Unable to obtain due to poor mentation     General: see HPI    Skin: no rashes, skin changes  	  Ophthalmologic: no eye pain or blurred vision  	  ENMT:	no sore throat, no ear pain    Respiratory and Thorax: see HPI    Cardiovascular:	see HPI    Gastrointestinal:	no  n/v/d,c    Genitourinary:	no FUD    Musculoskeletal: no muscle pain	    Neurological: no seizures, no dizziness    Hematology/Lymphatics: no petechia or purpura	    Endocrine: no polyuria, no polydipsia	      Karnofsky Performance Score/Palliative Performance Status Version 2:  40 %    Vital Signs Last 24 Hrs  T(C): 36.4 (12 Oct 2020 11:00), Max: 36.6 (11 Oct 2020 16:30)  T(F): 97.5 (12 Oct 2020 11:00), Max: 97.9 (11 Oct 2020 16:30)  HR: 56 (12 Oct 2020 11:00) (56 - 69)  BP: 161/77 (12 Oct 2020 11:00) (150/77 - 174/83)  BP(mean): 100 (12 Oct 2020 08:55) (100 - 100)  RR: 18 (12 Oct 2020 11:00) (17 - 18)  SpO2: 98% (12 Oct 2020 11:00) (96% - 98%)    PHYSICAL EXAM:    General: Thin elderly woman, awake alert NAD  HEENT: [ x] normal  [ ] dry mouth  [ ] ET tube/trach    Lungs: [x ] comfortable [ ] tachypnea/labored breathing  [ ] excessive secretions    CV: [x ] normal  [ ] tachycardia    GI: softly distended  NT     : [x ] normal  [ ] incontinent  [ ] oliguria/anuria  [ ] hdez    MSK: [ ] normal  [x ] weakness  [ ] edema             [ ] ambulatory  [ ] bedbound/wheelchair bound    Skin: [ ] normal  [ ] pressure ulcers- Stage_____  [ x] no rash    LABS:                        10.4   6.10  )-----------( 116      ( 12 Oct 2020 06:32 )             31.6     10-12    133<L>  |  98  |  76.0<H>  ----------------------------<  200<H>  4.6   |  23.0  |  2.28<H>    Ca    8.6      12 Oct 2020 06:32    TPro  5.6<L>  /  Alb  3.2<L>  /  TBili  0.5  /  DBili  x   /  AST  21  /  ALT  10  /  AlkPhos  106  10-12    PT/INR - ( 12 Oct 2020 06:32 )   PT: 12.1 sec;   INR: 1.05 ratio             I&O's Summary    11 Oct 2020 07:01  -  12 Oct 2020 07:00  --------------------------------------------------------  IN: 480 mL / OUT: 1848 mL / NET: -1368 mL    12 Oct 2020 07:01  -  12 Oct 2020 14:50  --------------------------------------------------------  IN: 240 mL / OUT: 500 mL / NET: -260 mL        RADIOLOGY & ADDITIONAL STUDIES:    < from: CT Chest No Cont (10.11.20 @ 15:22) >     EXAM:  CT CHEST                          PROCEDURE DATE:  10/11/2020          INTERPRETATION:  Clinical clinical information: Pleural effusion. Exam is compared to prior CT scan of the chest of 10/9/2020 as well as chest radiograph of 10/11/2020.    CT scan of the chest was obtained without administration of intravenous contrast.    No hilar and/or mediastinal adenopathy is noted.    Heart is normal in size. Calcification of the aortic valve and the coronary arteries is noted. No pericardial effusion is noted.    No endobronchial lesions are noted. Minimal atelectasis is noted involving portion of the right lower lobe. A right hydropneumothorax is noted. A right pigtail catheter is noted in place.    Below the diaphragm, visualized portions of the abdomen demonstrate nodular contour to the surface of the liver. Stones are noted within the gallbladder. Ascites is noted. Low-attenuation lesions within both kidneys are too small to be adequately characterized on this exam.    Degenerativechanges of the spine are noted.    Impression: Right hydropneumothorax with a pigtail catheter in place.      < end of copied text >        < from: US Abdomen Doppler (09.12.20 @ 10:43) >    EXAM:  US DPLX ABDOMEN        PROCEDURE DATE:  09/12/2020           INTERPRETATION:  Clinical data: Cirrhosis    COMPARISON: None available.    TECHNIQUE: Sonography of the right upper quadrant.    FINDINGS:    Liver: Cirrhosis. Patent main, right,and left portal veins with hepatopedal flow. Patent hepatic veins.  Bile ducts: Normal caliber.  Gallbladder: Gallstones.  Pancreas: Visualized portions are within normal limits.  Spleen: 12.9 cm borderline in size  Right kidney: 9.1 cm. No hydronephrosis.  Left kidney: 8.1 cm  Ascites: Moderate ascites.  IVC: Visualized portions are within normal limits.  Right pleural effusion.    IMPRESSION:    Cirrhosis.  Gallstones.  Moderate ascites.  Hepatopedal flow portal veins.        < end of copied text >    ADVANCE DIRECTIVES:  [ ] YES [ x] NO   DNR [ ] YES [ x] NO  Completed on:                     MOLST  [ ] YES [x ] NO   Completed on:  Living Will  [ ] YES [ x] NO   Completed on:

## 2020-10-12 NOTE — PHYSICAL THERAPY INITIAL EVALUATION ADULT - ADDITIONAL COMMENTS
Pt lives in a house with 2 HUMZA with a post she hold onto. Pt states she is never on the stairs alone, family always assists her. Reports that her daughter works and her granddaughter is home doing remote learning. Daughter at bedside states that family may be able to provide the assistance she needs when she goes home but she must talk to everyone.

## 2020-10-12 NOTE — CONSULT NOTE ADULT - REASON FOR ADMISSION
right Pleural effusion
s/p Fall
right Pleural effusion

## 2020-10-12 NOTE — PROGRESS NOTE ADULT - SUBJECTIVE AND OBJECTIVE BOX
NEPHROLOGY INTERVAL HPI/OVERNIGHT EVENTS:    Examined   No distress  Feeling better  Denies HA CP N/V/D no SOB    MEDICATIONS  (STANDING):  albumin human 25% IVPB 100 milliLiter(s) IV Intermittent every 8 hours  ATENolol  Tablet 50 milliGRAM(s) Oral two times a day  cloNIDine 0.2 milliGRAM(s) Oral two times a day  dextrose 5%. 1000 milliLiter(s) (50 mL/Hr) IV Continuous <Continuous>  dextrose 50% Injectable 12.5 Gram(s) IV Push once  dextrose 50% Injectable 25 Gram(s) IV Push once  dextrose 50% Injectable 25 Gram(s) IV Push once  furosemide   Injectable 40 milliGRAM(s) IV Push two times a day  heparin   Injectable 5000 Unit(s) SubCutaneous every 12 hours  influenza   Vaccine 0.5 milliLiter(s) IntraMuscular once  insulin glargine Injectable (LANTUS) 16 Unit(s) SubCutaneous at bedtime  insulin lispro (HumaLOG) corrective regimen sliding scale   SubCutaneous three times a day before meals  lidocaine   Patch 1 Patch Transdermal daily  predniSONE   Tablet 2.5 milliGRAM(s) Oral daily  spironolactone 25 milliGRAM(s) Oral daily    MEDICATIONS  (PRN):  acetaminophen   Tablet .. 650 milliGRAM(s) Oral every 6 hours PRN Temp greater or equal to 38C (100.4F), Mild Pain (1 - 3)  dextrose 40% Gel 15 Gram(s) Oral once PRN Blood Glucose LESS THAN 70 milliGRAM(s)/deciliter  glucagon  Injectable 1 milliGRAM(s) IntraMuscular once PRN Glucose LESS THAN 70 milligrams/deciliter  oxycodone    5 mG/acetaminophen 325 mG 1 Tablet(s) Oral every 4 hours PRN Moderate Pain (4 - 6)      Allergies    penicillin (Other)    Intolerances        Vital Signs Last 24 Hrs  T(C): 36.4 (12 Oct 2020 11:00), Max: 36.6 (11 Oct 2020 16:30)  T(F): 97.5 (12 Oct 2020 11:00), Max: 97.9 (11 Oct 2020 16:30)  HR: 56 (12 Oct 2020 11:00) (56 - 69)  BP: 161/77 (12 Oct 2020 11:00) (150/77 - 174/83)  BP(mean): 100 (12 Oct 2020 08:55) (100 - 100)  RR: 18 (12 Oct 2020 11:00) (17 - 18)  SpO2: 98% (12 Oct 2020 11:00) (96% - 98%)  Daily     Daily Weight in k (12 Oct 2020 05:40)    PHYSICAL EXAM:  GENERAL: appears chronically ill  HEAD:  NCAT  EYES: EOMI  NECK: Supple, neck  veins full  NERVOUS SYSTEM:  Alert & Oriented X3  CHEST/LUNG: Clear to percussion bilaterally  HEART: Regular rate and rhythm  ABDOMEN: Soft, Nontender, Nondistended; Bowel sounds present  EXTREMITIES: trace edema improved    LABS:                        10.4   6.10  )-----------( 116      ( 12 Oct 2020 06:32 )             31.6     10-12    133<L>  |  98  |  76.0<H>  ----------------------------<  200<H>  4.6   |  23.0  |  2.28<H>    Ca    8.6      12 Oct 2020 06:32    TPro  5.6<L>  /  Alb  3.2<L>  /  TBili  0.5  /  DBili  x   /  AST  21  /  ALT  10  /  AlkPhos  106  10-12    PT/INR - ( 12 Oct 2020 06:32 )   PT: 12.1 sec;   INR: 1.05 ratio                     RADIOLOGY & ADDITIONAL TESTS:

## 2020-10-12 NOTE — PROGRESS NOTE ADULT - ASSESSMENT
85 year old F with PMH of HTN, CHF, cirrhosis with ascites, HLD, DM, AFib (no A/C) presented to ED s/p fall, reported as mechanical fall, tripped and fell, denies loss of consciousness or head injury. Patient stated she had worsening shortness of breath for the past 3 days.  Acute hypoxic respiratory failure due to right pleural effusion, likely hepatic hydrothorax   -CXR showed large right side pleural effusion  - S/p chest tube, follow up cytology results  - CTS, Pulm following  - Supplemental oxygen via NC prn  - Daily CXR  Volume overload 2/2 to cirrhosis  - Cardiology consulted, recs appreciated.  - GI consulted, recs apprecitated  - Strict I&Os, daily weights, fluid/salt restriction.  - Lasix 40mg IV BID for now, possible transition to PO in next 24-48 hrs.  - C/w Spirinolactone 25mg daily  - Telemetry monitoring  - C/w albumin for 48-72 hours to optimize for possible paracentisis.    DEVYN vs CKD  - Nephro consulted, recs appreciated  - Monitor BMP, correct electrolytes as needed.    Hypertensive urgency, improved  - C/w with Lasix 40mg IV BID, spironolactone 25mg and clonidine        IDDM  - Increase Lantus to 16u qhs  -ISS, hypoglycemic protocol, FSG     Arthritis   -cont with prednisone     Severe protein calorie malnutrition  - Supplementation  DVT ppx   - Heparin sq  Dispo: Pending clinical course (GI/CTS)    Family (Daughter, Saba) updated  regarding patient's clinical condition. They were explained eventual need for paracentesis.  They requested to be updated prior to proceeding. They also want full treatment for now, however enquired about home hospice in case of worsening clinical situation and need for aggressive interventions. Palliative consult placed.

## 2020-10-12 NOTE — CONSULT NOTE ADULT - ASSESSMENT
85 yr woman hx of cirrhosis, CHF, afib admitted after a mechanical fall  with sx of SOB found to have large right pleural effusion associated with HF and DEVYN likely on CKD

## 2020-10-12 NOTE — PROGRESS NOTE ADULT - SUBJECTIVE AND OBJECTIVE BOX
Pt seen and examined. Right sided chest tube in place. Thoracic Surgery note appreciated. Palliative Care seeing pt. presently. No GI complaints presently.     REVIEW OF SYSTEMS:  Constitutional: No fever, weight loss or fatigue  Cardiovascular: No chest pain, palpitations, dizziness or leg swelling  Gastrointestinal: As noted above. No abdominal or epigastric pain. No nausea, vomiting or hematemesis; No diarrhea or constipation. No melena or hematochezia.  Skin: No itching, burning, rashes or lesions       MEDICATIONS:  MEDICATIONS  (STANDING):  albumin human 25% IVPB 100 milliLiter(s) IV Intermittent every 8 hours  ATENolol  Tablet 50 milliGRAM(s) Oral two times a day  cloNIDine 0.2 milliGRAM(s) Oral two times a day  dextrose 5%. 1000 milliLiter(s) (50 mL/Hr) IV Continuous <Continuous>  dextrose 50% Injectable 12.5 Gram(s) IV Push once  dextrose 50% Injectable 25 Gram(s) IV Push once  dextrose 50% Injectable 25 Gram(s) IV Push once  furosemide   Injectable 40 milliGRAM(s) IV Push two times a day  heparin   Injectable 5000 Unit(s) SubCutaneous every 12 hours  influenza   Vaccine 0.5 milliLiter(s) IntraMuscular once  insulin glargine Injectable (LANTUS) 16 Unit(s) SubCutaneous at bedtime  insulin lispro (HumaLOG) corrective regimen sliding scale   SubCutaneous three times a day before meals  lidocaine   Patch 1 Patch Transdermal daily  predniSONE   Tablet 2.5 milliGRAM(s) Oral daily  spironolactone 25 milliGRAM(s) Oral daily    MEDICATIONS  (PRN):  acetaminophen   Tablet .. 650 milliGRAM(s) Oral every 6 hours PRN Temp greater or equal to 38C (100.4F), Mild Pain (1 - 3)  dextrose 40% Gel 15 Gram(s) Oral once PRN Blood Glucose LESS THAN 70 milliGRAM(s)/deciliter  glucagon  Injectable 1 milliGRAM(s) IntraMuscular once PRN Glucose LESS THAN 70 milligrams/deciliter  oxycodone    5 mG/acetaminophen 325 mG 1 Tablet(s) Oral every 4 hours PRN Moderate Pain (4 - 6)      Allergies    penicillin (Other)    Intolerances        Vital Signs Last 24 Hrs  T(C): 36.4 (12 Oct 2020 11:00), Max: 36.6 (11 Oct 2020 16:30)  T(F): 97.5 (12 Oct 2020 11:00), Max: 97.9 (11 Oct 2020 16:30)  HR: 56 (12 Oct 2020 11:00) (56 - 69)  BP: 161/77 (12 Oct 2020 11:00) (150/77 - 174/83)  BP(mean): 100 (12 Oct 2020 08:55) (100 - 100)  RR: 18 (12 Oct 2020 11:00) (17 - 18)  SpO2: 98% (12 Oct 2020 11:00) (96% - 98%)    10-11 @ 07:01  -  10-12 @ 07:00  --------------------------------------------------------  IN: 480 mL / OUT: 1848 mL / NET: -1368 mL        PHYSICAL EXAM:    General: Well developed; well nourished; in no acute distress  HEENT: MMM, conjunctiva and sclera clear  Lungs: Right sided chest tube in place, Decreased breath sounds on right. Left lung: Clear  Cor: RRR S1, S2 only.  Gastrointestinal: Abdomen: Unchanged.  Extremities: no cyanosis or clipping  Skin: Warm and dry. No obvious rash  Neuro: Pt. a + o x 3    LABS:      CBC Full  -  ( 12 Oct 2020 06:32 )  WBC Count : 6.10 K/uL  RBC Count : 3.36 M/uL  Hemoglobin : 10.4 g/dL  Hematocrit : 31.6 %  Platelet Count - Automated : 116 K/uL  Mean Cell Volume : 94.0 fl  Mean Cell Hemoglobin : 31.0 pg  Mean Cell Hemoglobin Concentration : 32.9 gm/dL  Auto Neutrophil # : x  Auto Lymphocyte # : x  Auto Monocyte # : x  Auto Eosinophil # : x  Auto Basophil # : x  Auto Neutrophil % : x  Auto Lymphocyte % : x  Auto Monocyte % : x  Auto Eosinophil % : x  Auto Basophil % : x    10-12    133<L>  |  98  |  76.0<H>  ----------------------------<  200<H>  4.6   |  23.0  |  2.28<H>    Ca    8.6      12 Oct 2020 06:32    TPro  5.6<L>  /  Alb  3.2<L>  /  TBili  0.5  /  DBili  x   /  AST  21  /  ALT  10  /  AlkPhos  106  10-12    PT/INR - ( 12 Oct 2020 06:32 )   PT: 12.1 sec;   INR: 1.05 ratio                           RADIOLOGY & ADDITIONAL STUDIES (The following images were personally reviewed):

## 2020-10-13 LAB
ALBUMIN SERPL ELPH-MCNC: 3.6 G/DL — SIGNIFICANT CHANGE UP (ref 3.3–5.2)
ALP SERPL-CCNC: 123 U/L — HIGH (ref 40–120)
ALT FLD-CCNC: 13 U/L — SIGNIFICANT CHANGE UP
ANION GAP SERPL CALC-SCNC: 13 MMOL/L — SIGNIFICANT CHANGE UP (ref 5–17)
AST SERPL-CCNC: 27 U/L — SIGNIFICANT CHANGE UP
BASOPHILS # BLD AUTO: 0.02 K/UL — SIGNIFICANT CHANGE UP (ref 0–0.2)
BASOPHILS NFR BLD AUTO: 0.3 % — SIGNIFICANT CHANGE UP (ref 0–2)
BILIRUB SERPL-MCNC: 0.7 MG/DL — SIGNIFICANT CHANGE UP (ref 0.4–2)
BUN SERPL-MCNC: 77 MG/DL — HIGH (ref 8–20)
CALCIUM SERPL-MCNC: 9.3 MG/DL — SIGNIFICANT CHANGE UP (ref 8.6–10.2)
CHLORIDE SERPL-SCNC: 99 MMOL/L — SIGNIFICANT CHANGE UP (ref 98–107)
CO2 SERPL-SCNC: 24 MMOL/L — SIGNIFICANT CHANGE UP (ref 22–29)
CREAT SERPL-MCNC: 1.94 MG/DL — HIGH (ref 0.5–1.3)
EOSINOPHIL # BLD AUTO: 0.24 K/UL — SIGNIFICANT CHANGE UP (ref 0–0.5)
EOSINOPHIL NFR BLD AUTO: 3.4 % — SIGNIFICANT CHANGE UP (ref 0–6)
GLUCOSE BLDC GLUCOMTR-MCNC: 157 MG/DL — HIGH (ref 70–99)
GLUCOSE BLDC GLUCOMTR-MCNC: 210 MG/DL — HIGH (ref 70–99)
GLUCOSE BLDC GLUCOMTR-MCNC: 241 MG/DL — HIGH (ref 70–99)
GLUCOSE BLDC GLUCOMTR-MCNC: 290 MG/DL — HIGH (ref 70–99)
GLUCOSE SERPL-MCNC: 196 MG/DL — HIGH (ref 70–99)
HCT VFR BLD CALC: 37.5 % — SIGNIFICANT CHANGE UP (ref 34.5–45)
HGB BLD-MCNC: 12.3 G/DL — SIGNIFICANT CHANGE UP (ref 11.5–15.5)
IMM GRANULOCYTES NFR BLD AUTO: 0.3 % — SIGNIFICANT CHANGE UP (ref 0–1.5)
INR BLD: 1 RATIO — SIGNIFICANT CHANGE UP (ref 0.88–1.16)
LYMPHOCYTES # BLD AUTO: 1.08 K/UL — SIGNIFICANT CHANGE UP (ref 1–3.3)
LYMPHOCYTES # BLD AUTO: 15.5 % — SIGNIFICANT CHANGE UP (ref 13–44)
MCHC RBC-ENTMCNC: 31 PG — SIGNIFICANT CHANGE UP (ref 27–34)
MCHC RBC-ENTMCNC: 32.8 GM/DL — SIGNIFICANT CHANGE UP (ref 32–36)
MCV RBC AUTO: 94.5 FL — SIGNIFICANT CHANGE UP (ref 80–100)
MONOCYTES # BLD AUTO: 0.89 K/UL — SIGNIFICANT CHANGE UP (ref 0–0.9)
MONOCYTES NFR BLD AUTO: 12.8 % — SIGNIFICANT CHANGE UP (ref 2–14)
NEUTROPHILS # BLD AUTO: 4.73 K/UL — SIGNIFICANT CHANGE UP (ref 1.8–7.4)
NEUTROPHILS NFR BLD AUTO: 67.7 % — SIGNIFICANT CHANGE UP (ref 43–77)
PLATELET # BLD AUTO: 144 K/UL — LOW (ref 150–400)
POTASSIUM SERPL-MCNC: 4.5 MMOL/L — SIGNIFICANT CHANGE UP (ref 3.5–5.3)
POTASSIUM SERPL-SCNC: 4.5 MMOL/L — SIGNIFICANT CHANGE UP (ref 3.5–5.3)
PROT SERPL-MCNC: 6.5 G/DL — LOW (ref 6.6–8.7)
PROTHROM AB SERPL-ACNC: 11.6 SEC — SIGNIFICANT CHANGE UP (ref 10.6–13.6)
RBC # BLD: 3.97 M/UL — SIGNIFICANT CHANGE UP (ref 3.8–5.2)
RBC # FLD: 14 % — SIGNIFICANT CHANGE UP (ref 10.3–14.5)
SODIUM SERPL-SCNC: 136 MMOL/L — SIGNIFICANT CHANGE UP (ref 135–145)
WBC # BLD: 6.98 K/UL — SIGNIFICANT CHANGE UP (ref 3.8–10.5)
WBC # FLD AUTO: 6.98 K/UL — SIGNIFICANT CHANGE UP (ref 3.8–10.5)

## 2020-10-13 PROCEDURE — 99233 SBSQ HOSP IP/OBS HIGH 50: CPT

## 2020-10-13 PROCEDURE — 99232 SBSQ HOSP IP/OBS MODERATE 35: CPT

## 2020-10-13 PROCEDURE — 71045 X-RAY EXAM CHEST 1 VIEW: CPT | Mod: 26

## 2020-10-13 RX ORDER — LOSARTAN POTASSIUM 100 MG/1
25 TABLET, FILM COATED ORAL DAILY
Refills: 0 | Status: DISCONTINUED | OUTPATIENT
Start: 2020-10-13 | End: 2020-10-15

## 2020-10-13 RX ORDER — AMLODIPINE BESYLATE 2.5 MG/1
5 TABLET ORAL DAILY
Refills: 0 | Status: DISCONTINUED | OUTPATIENT
Start: 2020-10-13 | End: 2020-10-14

## 2020-10-13 RX ORDER — FUROSEMIDE 40 MG
20 TABLET ORAL DAILY
Refills: 0 | Status: DISCONTINUED | OUTPATIENT
Start: 2020-10-13 | End: 2020-10-17

## 2020-10-13 RX ORDER — INSULIN GLARGINE 100 [IU]/ML
20 INJECTION, SOLUTION SUBCUTANEOUS AT BEDTIME
Refills: 0 | Status: DISCONTINUED | OUTPATIENT
Start: 2020-10-13 | End: 2020-10-15

## 2020-10-13 RX ADMIN — HEPARIN SODIUM 5000 UNIT(S): 5000 INJECTION INTRAVENOUS; SUBCUTANEOUS at 11:04

## 2020-10-13 RX ADMIN — Medication 650 MILLIGRAM(S): at 07:41

## 2020-10-13 RX ADMIN — HEPARIN SODIUM 5000 UNIT(S): 5000 INJECTION INTRAVENOUS; SUBCUTANEOUS at 21:20

## 2020-10-13 RX ADMIN — Medication 0.2 MILLIGRAM(S): at 07:04

## 2020-10-13 RX ADMIN — Medication 0.2 MILLIGRAM(S): at 17:25

## 2020-10-13 RX ADMIN — Medication 50 MILLILITER(S): at 22:41

## 2020-10-13 RX ADMIN — Medication 3: at 12:34

## 2020-10-13 RX ADMIN — Medication 40 MILLIGRAM(S): at 07:10

## 2020-10-13 RX ADMIN — INSULIN GLARGINE 20 UNIT(S): 100 INJECTION, SOLUTION SUBCUTANEOUS at 21:20

## 2020-10-13 RX ADMIN — Medication 50 MILLILITER(S): at 15:25

## 2020-10-13 RX ADMIN — Medication 2: at 08:55

## 2020-10-13 RX ADMIN — AMLODIPINE BESYLATE 5 MILLIGRAM(S): 2.5 TABLET ORAL at 12:38

## 2020-10-13 RX ADMIN — SPIRONOLACTONE 25 MILLIGRAM(S): 25 TABLET, FILM COATED ORAL at 07:04

## 2020-10-13 RX ADMIN — Medication 650 MILLIGRAM(S): at 07:04

## 2020-10-13 RX ADMIN — Medication 2.5 MILLIGRAM(S): at 07:04

## 2020-10-13 RX ADMIN — Medication 2: at 17:24

## 2020-10-13 NOTE — PROGRESS NOTE ADULT - SUBJECTIVE AND OBJECTIVE BOX
[facility] Division of Hospital Medicine  Harlan Busch [phone]    Chief Complaint:  Patient is a 85y old  Female who presents with a chief complaint of right Pleural effusion (10 Oct 2020 13:17)      SUBJECTIVE / OVERNIGHT EVENTS:  Patient seen and examined at bedside. No acute events reported overnight. Patient endorses no new complaints.    Patient denies chest pain, abd pain, N/V, fever, chills, dysuria or any other complaints. All remainder ROS negative.     MEDICATIONS  (STANDING):  ATENolol  Tablet 50 milliGRAM(s) Oral two times a day  cloNIDine 0.2 milliGRAM(s) Oral two times a day  dextrose 5%. 1000 milliLiter(s) (50 mL/Hr) IV Continuous <Continuous>  dextrose 50% Injectable 12.5 Gram(s) IV Push once  dextrose 50% Injectable 25 Gram(s) IV Push once  dextrose 50% Injectable 25 Gram(s) IV Push once  furosemide   Injectable 40 milliGRAM(s) IV Push two times a day  heparin   Injectable 5000 Unit(s) SubCutaneous every 12 hours  insulin glargine Injectable (LANTUS) 10 Unit(s) SubCutaneous at bedtime  insulin lispro (HumaLOG) corrective regimen sliding scale   SubCutaneous three times a day before meals  lidocaine   Patch 1 Patch Transdermal daily  predniSONE   Tablet 2.5 milliGRAM(s) Oral daily  spironolactone 25 milliGRAM(s) Oral daily    MEDICATIONS  (PRN):  acetaminophen   Tablet .. 650 milliGRAM(s) Oral every 6 hours PRN Temp greater or equal to 38C (100.4F), Mild Pain (1 - 3)  dextrose 40% Gel 15 Gram(s) Oral once PRN Blood Glucose LESS THAN 70 milliGRAM(s)/deciliter  glucagon  Injectable 1 milliGRAM(s) IntraMuscular once PRN Glucose LESS THAN 70 milligrams/deciliter  oxycodone    5 mG/acetaminophen 325 mG 1 Tablet(s) Oral every 4 hours PRN Moderate Pain (4 - 6)        I&O's Summary    09 Oct 2020 07:01  -  10 Oct 2020 07:00  --------------------------------------------------------  IN: 0 mL / OUT: 5440 mL / NET: -5440 mL    10 Oct 2020 07:01  -  10 Oct 2020 14:09  --------------------------------------------------------  IN: 0 mL / OUT: 500 mL / NET: -500 mL        PHYSICAL EXAM:  Vital Signs Last 24 Hrs  T(C): 36.6 (10 Oct 2020 11:30), Max: 36.6 (10 Oct 2020 11:30)  T(F): 97.9 (10 Oct 2020 11:30), Max: 97.9 (10 Oct 2020 11:30)  HR: 60 (10 Oct 2020 11:) (56 - 76)  BP: 102/61 (10 Oct 2020 11:30) (102/61 - 184/68)  BP(mean): --  RR: 18 (10 Oct 2020 11:) (18 - 19)  SpO2: 97% (10 Oct 2020 11:) (94% - 99%)        CONSTITUTIONAL: Elderly female, frail, in NAD, pleasant.  HEENT: NC/AT, PERRL, no JVD  RESPIRATORY: Decreased breath sounds R lung  CARDIOVASCULAR: RRR, S1/S2+, no m/g/r  ABDOMEN: Mildly distended abdomen, non-tender, BS+  MUSCLOSKELETAL: Trace pedal edema, no cyanosis or deformities.  PSYCH: A+O to person, place, and time; affect appropriate  NEUROLOGY: CN 2-12 are intact and symmetric; no gross neurological deficits.  SKIN: No rashes; no palpable lesions  VASC: Distal pulses palpable    LABS:                        12.0   8.91  )-----------( 135      ( 10 Oct 2020 03:17 )             37.1     10-10    137  |  101  |  61.0<H>  ----------------------------<  260<H>  5.1   |  25.0  |  1.99<H>    Ca    8.6      10 Oct 2020 03:17  Phos  4.0     10-10  Mg     1.9     10-10    TPro  6.8  /  Alb  2.8<L>  /  TBili  0.6  /  DBili  x   /  AST  35<H>  /  ALT  19  /  AlkPhos  163<H>  10-09    PT/INR - ( 09 Oct 2020 01:49 )   PT: 11.9 sec;   INR: 1.03 ratio         PTT - ( 09 Oct 2020 01:49 )  PTT:29.7 sec  CARDIAC MARKERS ( 09 Oct 2020 01:49 )  x     / 0.03 ng/mL / x     / x     / x          Urinalysis Basic - ( 09 Oct 2020 06:26 )    Color: Yellow / Appearance: Clear / S.010 / pH: x  Gluc: x / Ketone: Negative  / Bili: Negative / Urobili: Negative mg/dL   Blood: x / Protein: 100 mg/dL / Nitrite: Negative   Leuk Esterase: Negative / RBC: 11-25 /HPF / WBC 3-5   Sq Epi: x / Non Sq Epi: Occasional / Bacteria: Few        Culture - Body Fluid with Gram Stain (collected 09 Oct 2020 16:33)  Source: .Body Fluid Pleural Fluid  Gram Stain (09 Oct 2020 19:22):    No polymorphonuclear cells seen    No organisms seen    by cytocentrifuge  Preliminary Report (10 Oct 2020 12:47):    No growth      CAPILLARY BLOOD GLUCOSE      POCT Blood Glucose.: 273 mg/dL (10 Oct 2020 11:27)  POCT Blood Glucose.: 259 mg/dL (10 Oct 2020 08:12)  POCT Blood Glucose.: 221 mg/dL (09 Oct 2020 16:16)        RADIOLOGY & ADDITIONAL TESTS:  Results Reviewed:   Imaging Personally Reviewed:  Electrocardiogram Personally Reviewed:                                           [facility] Division of Hospital Medicine  Harlan Busch [phone]    Chief Complaint:  Patient is a 85y old  Female who presents with a chief complaint of right Pleural effusion (10 Oct 2020 13:17)      SUBJECTIVE / OVERNIGHT EVENTS:  Patient seen and examined at bedside. No acute events reported overnight. Patient endorses no new complaints.    Patient denies chest pain, abd pain, N/V, fever, chills, dysuria or any other complaints. All remainder ROS negative.     MEDICATIONS  (STANDING):  ATENolol  Tablet 50 milliGRAM(s) Oral two times a day  cloNIDine 0.2 milliGRAM(s) Oral two times a day  dextrose 5%. 1000 milliLiter(s) (50 mL/Hr) IV Continuous <Continuous>  dextrose 50% Injectable 12.5 Gram(s) IV Push once  dextrose 50% Injectable 25 Gram(s) IV Push once  dextrose 50% Injectable 25 Gram(s) IV Push once  furosemide   Injectable 40 milliGRAM(s) IV Push two times a day  heparin   Injectable 5000 Unit(s) SubCutaneous every 12 hours  insulin glargine Injectable (LANTUS) 10 Unit(s) SubCutaneous at bedtime  insulin lispro (HumaLOG) corrective regimen sliding scale   SubCutaneous three times a day before meals  lidocaine   Patch 1 Patch Transdermal daily  predniSONE   Tablet 2.5 milliGRAM(s) Oral daily  spironolactone 25 milliGRAM(s) Oral daily    MEDICATIONS  (PRN):  acetaminophen   Tablet .. 650 milliGRAM(s) Oral every 6 hours PRN Temp greater or equal to 38C (100.4F), Mild Pain (1 - 3)  dextrose 40% Gel 15 Gram(s) Oral once PRN Blood Glucose LESS THAN 70 milliGRAM(s)/deciliter  glucagon  Injectable 1 milliGRAM(s) IntraMuscular once PRN Glucose LESS THAN 70 milligrams/deciliter  oxycodone    5 mG/acetaminophen 325 mG 1 Tablet(s) Oral every 4 hours PRN Moderate Pain (4 - 6)        I&O's Summary    09 Oct 2020 07:01  -  10 Oct 2020 07:00  --------------------------------------------------------  IN: 0 mL / OUT: 5440 mL / NET: -5440 mL    10 Oct 2020 07:01  -  10 Oct 2020 14:09  --------------------------------------------------------  IN: 0 mL / OUT: 500 mL / NET: -500 mL        PHYSICAL EXAM:  Vital Signs Last 24 Hrs  T(C): 36.6 (10 Oct 2020 11:30), Max: 36.6 (10 Oct 2020 11:30)  T(F): 97.9 (10 Oct 2020 11:30), Max: 97.9 (10 Oct 2020 11:30)  HR: 60 (10 Oct 2020 11:) (56 - 76)  BP: 102/61 (10 Oct 2020 11:30) (102/61 - 184/68)  BP(mean): --  RR: 18 (10 Oct 2020 11:) (18 - 19)  SpO2: 97% (10 Oct 2020 11:) (94% - 99%)        CONSTITUTIONAL: Elderly female, frail, in NAD, pleasant.  HEENT: NC/AT, PERRL, no JVD  RESPIRATORY: Decreased breath sounds R lung  CARDIOVASCULAR: RRR, S1/S2+, no m/g/r  ABDOMEN: Mildly distended abdomen, non-tender, BS+  MUSCLOSKELETAL: No edema, cyanosis, deformities  PSYCH: A+O to person, place, and time; affect appropriate  NEUROLOGY: CN 2-12 are intact and symmetric; no gross neurological deficits.  SKIN: No rashes; no palpable lesions  VASC: Distal pulses palpable    LABS:                        12.0   8.91  )-----------( 135      ( 10 Oct 2020 03:17 )             37.1     10-10    137  |  101  |  61.0<H>  ----------------------------<  260<H>  5.1   |  25.0  |  1.99<H>    Ca    8.6      10 Oct 2020 03:17  Phos  4.0     10-10  Mg     1.9     10-10    TPro  6.8  /  Alb  2.8<L>  /  TBili  0.6  /  DBili  x   /  AST  35<H>  /  ALT  19  /  AlkPhos  163<H>  10    PT/INR - ( 09 Oct 2020 01:49 )   PT: 11.9 sec;   INR: 1.03 ratio         PTT - ( 09 Oct 2020 01:49 )  PTT:29.7 sec  CARDIAC MARKERS ( 09 Oct 2020 01:49 )  x     / 0.03 ng/mL / x     / x     / x          Urinalysis Basic - ( 09 Oct 2020 06:26 )    Color: Yellow / Appearance: Clear / S.010 / pH: x  Gluc: x / Ketone: Negative  / Bili: Negative / Urobili: Negative mg/dL   Blood: x / Protein: 100 mg/dL / Nitrite: Negative   Leuk Esterase: Negative / RBC: 11-25 /HPF / WBC 3-5   Sq Epi: x / Non Sq Epi: Occasional / Bacteria: Few        Culture - Body Fluid with Gram Stain (collected 09 Oct 2020 16:33)  Source: .Body Fluid Pleural Fluid  Gram Stain (09 Oct 2020 19:22):    No polymorphonuclear cells seen    No organisms seen    by cytocentrifuge  Preliminary Report (10 Oct 2020 12:47):    No growth      CAPILLARY BLOOD GLUCOSE      POCT Blood Glucose.: 273 mg/dL (10 Oct 2020 11:27)  POCT Blood Glucose.: 259 mg/dL (10 Oct 2020 08:12)  POCT Blood Glucose.: 221 mg/dL (09 Oct 2020 16:16)        RADIOLOGY & ADDITIONAL TESTS:  Results Reviewed:   Imaging Personally Reviewed:  Electrocardiogram Personally Reviewed:

## 2020-10-13 NOTE — PROGRESS NOTE ADULT - ASSESSMENT
85 yr woman hx of cirrhosis, CHF, afib admitted after a mechanical fall  with sx of SOB found to have large right pleural effusion associated with HF, cirrhosis  and DEVYN likely on CKD     Problem/Recommendation - 1:  Problem: Pleural effusion. 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  cytology negative  CTS following       Problem/Recommendation - 2:  ·  Problem: Cirrhosis.  Recommendation: with Ascites  Noted GI input- continuing with salt poor albumin.      Problem/Recommendation - 3:  ·  Problem: DEVYN (acute kidney injury).  Recommendation: Renal following.  cont medical optimization  patient states would NOT want dialysis.      Problem/Recommendation - 4:  ·  Problem: CHF (congestive heart failure).  Recommendation:   cont Spironolactone and Lasix.      Problem/Recommendation - 5:  ·  Problem: Encounter for palliative care.  Recommendation:  Patient states would not want aggressive interventions. Difficulty in having patient focus on discussion about advance directives.   HCP completed naming daughter Pippa as primary proxy  Spoke to daughter Pippa. She is aware of her mother's overall poor condition and has discussed with mother about hospice services.    Hospice referral made.  Pippa states mother has a living will an will speak further to her mother about advance directives.   Plan to meet together tomorrow to discuss MOLST

## 2020-10-13 NOTE — PROGRESS NOTE ADULT - SUBJECTIVE AND OBJECTIVE BOX
CC:  follow up   INTERVAL HPI/OVERNIGHT EVENTS:    PRESENT SYMPTOMS: SOURCE:  Patient/Family/Team    PAIN SCALE:  0 = none  1 = mild   2 = moderate  3 = severe    Pain: 1    Dyspnea:  [ ] YES [x] NO  Anxiety:  [ ] YES [x ] NO  Fatigue: [ x] YES [ ] NO  Nausea: [ ] YES [x NO  Loss of Appetite: [ x] YES [ ] NO  Other symptoms: __________    MEDICATIONS  (STANDING):  albumin human 25% IVPB 100 milliLiter(s) IV Intermittent every 8 hours  amLODIPine   Tablet 5 milliGRAM(s) Oral daily  ATENolol  Tablet 50 milliGRAM(s) Oral two times a day  cloNIDine 0.2 milliGRAM(s) Oral two times a day  dextrose 5%. 1000 milliLiter(s) (50 mL/Hr) IV Continuous <Continuous>  dextrose 50% Injectable 12.5 Gram(s) IV Push once  dextrose 50% Injectable 25 Gram(s) IV Push once  dextrose 50% Injectable 25 Gram(s) IV Push once  furosemide    Tablet 20 milliGRAM(s) Oral daily  heparin   Injectable 5000 Unit(s) SubCutaneous every 12 hours  influenza   Vaccine 0.5 milliLiter(s) IntraMuscular once  insulin glargine Injectable (LANTUS) 16 Unit(s) SubCutaneous at bedtime  insulin lispro (HumaLOG) corrective regimen sliding scale   SubCutaneous three times a day before meals  lidocaine   Patch 1 Patch Transdermal daily  predniSONE   Tablet 2.5 milliGRAM(s) Oral daily  spironolactone 25 milliGRAM(s) Oral daily    MEDICATIONS  (PRN):  acetaminophen   Tablet .. 650 milliGRAM(s) Oral every 6 hours PRN Temp greater or equal to 38C (100.4F), Mild Pain (1 - 3)  dextrose 40% Gel 15 Gram(s) Oral once PRN Blood Glucose LESS THAN 70 milliGRAM(s)/deciliter  glucagon  Injectable 1 milliGRAM(s) IntraMuscular once PRN Glucose LESS THAN 70 milligrams/deciliter  oxycodone    5 mG/acetaminophen 325 mG 1 Tablet(s) Oral every 4 hours PRN Moderate Pain (4 - 6)      Allergies    penicillin (Other)    Intolerances      Karnofsky Performance Score/Palliative Performance Status Version 2:   40  %    Vital Signs Last 24 Hrs  T(C): 36.4 (13 Oct 2020 10:15), Max: 36.6 (13 Oct 2020 07:01)  T(F): 97.5 (13 Oct 2020 10:15), Max: 97.8 (13 Oct 2020 07:01)  HR: 63 (13 Oct 2020 12:39) (55 - 98)  BP: 144/70 (13 Oct 2020 12:39) (139/68 - 198/92)  BP(mean): 128 (12 Oct 2020 17:46) (128 - 128)  RR: 18 (13 Oct 2020 10:15) (18 - 18)  SpO2: 97% (13 Oct 2020 10:15) (95% - 98%)    PHYSICAL EXAM:    General: awake alert NAD  HEENT: [ x] normal  [ ] dry mouth  [ ] ET tube/trach    Lungs: [x ] comfortable [ ] tachypnea/labored breathing  [ ] excessive secretions    CV: [x] normal  [ ] tachycardia    GI: softly distended non tender  : [x normal  [ ] incontinent  [ ] oliguria/anuria  [ ] hdez    MSK: [ ] normal  [x ] weakness  [ ] edema             [ ] ambulatory  [ x] bedbound/wheelchair bound    Skin: [ ] normal  [ ] pressure ulcers- Stage_____  [ x] no rash    LABS:                        12.3   6.98  )-----------( 144      ( 13 Oct 2020 06:51 )             37.5     10-13    136  |  99  |  77.0<H>  ----------------------------<  196<H>  4.5   |  24.0  |  1.94<H>    Ca    9.3      13 Oct 2020 06:51    TPro  6.5<L>  /  Alb  3.6  /  TBili  0.7  /  DBili  x   /  AST  27  /  ALT  13  /  AlkPhos  123<H>  10-13    PT/INR - ( 13 Oct 2020 06:51 )   PT: 11.6 sec;   INR: 1.00 ratio             I&O's Summary    12 Oct 2020 07:01  -  13 Oct 2020 07:00  --------------------------------------------------------  IN: 340 mL / OUT: 3330 mL / NET: -2990 mL    13 Oct 2020 07:01  -  13 Oct 2020 14:45  --------------------------------------------------------  IN: 600 mL / OUT: 500 mL / NET: 100 mL        RADIOLOGY & ADDITIONAL STUDIES:    < from: Xray Chest 1 View- PORTABLE-Routine (Xray Chest 1 View- PORTABLE-Routine in AM.) (10.12.20 @ 05:56) >     EXAM:  XR CHEST PORTABLE ROUTINE 1V                          PROCEDURE DATE:  10/12/2020          INTERPRETATION:  CHEST AP PORTABLE:    History: chest tube.    Date and time of exam: 10/12/2020 4:15 AM.    Technique: A single AP view of the chestwas obtained.    Comparison exam: 10/11/2020 4:49 AM.    Findings:  Again noted is a right pigtail chest tube. There is a large right pneumothorax occupying the majority of the right hemithorax. No evidence of mediastinal shift. The heart is not enlarged. No hilar or mediastinal abnormality. The left lung is clear..    Impression:    < end of copied text >

## 2020-10-13 NOTE — PROGRESS NOTE ADULT - SUBJECTIVE AND OBJECTIVE BOX
Subjective: Patient c/o some pain at tube insertion site. Saturating well on RA. Denies fever, chills, cough, hemoptysis, chest pain.     Vital Signs:  Vital Signs Last 24 Hrs  T(C): 36.4 (10-13-20 @ 10:15), Max: 36.6 (10-13-20 @ 07:01)  T(F): 97.5 (10-13-20 @ 10:15), Max: 97.8 (10-13-20 @ 07:01)  HR: 55 (10-13-20 @ 10:15) (55 - 98)  BP: 139/68 (10-13-20 @ 10:15) (139/68 - 198/92)  RR: 18 (10-13-20 @ 10:15) (18 - 18)  SpO2: 97% (10-13-20 @ 10:15) (95% - 98%) on (O2)    I&O's Detail    12 Oct 2020 07:01  -  13 Oct 2020 07:00  --------------------------------------------------------  IN:    IV PiggyBack: 100 mL    Oral Fluid: 240 mL  Total IN: 340 mL    OUT:    Chest Tube (mL): 1380 mL    Voided (mL): 1950 mL  Total OUT: 3330 mL    Total NET: -2990 mL          General: NAD  Neurology: Awake, nonfocal, BLANCAS x 4  Eyes: Scleras clear, PERRLA/ EOMI, Gross vision intact  ENT: Gross hearing intact, grossly patent pharynx, no stridor  Neck: Neck supple, trachea midline, No JVD  Respiratory: Decreased BS at right base  CV: S1S2, no murmurs, rubs or gallops  Abdominal: Soft, NT  Extremities: No edema  Psych: Oriented x 3, normal affect  Tubes: R PTC to WS, no air leak, 1380cc out in 24H    Relevant labs, radiology and Medications reviewed                        12.3   6.98  )-----------( 144      ( 13 Oct 2020 06:51 )             37.5     10-13    136  |  99  |  77.0<H>  ----------------------------<  196<H>  4.5   |  24.0  |  1.94<H>    Ca    9.3      13 Oct 2020 06:51    TPro  6.5<L>  /  Alb  3.6  /  TBili  0.7  /  DBili  x   /  AST  27  /  ALT  13  /  AlkPhos  123<H>  10-13    PT/INR - ( 13 Oct 2020 06:51 )   PT: 11.6 sec;   INR: 1.00 ratio           MEDICATIONS  (STANDING):  albumin human 25% IVPB 100 milliLiter(s) IV Intermittent every 8 hours  amLODIPine   Tablet 5 milliGRAM(s) Oral daily  ATENolol  Tablet 50 milliGRAM(s) Oral two times a day  cloNIDine 0.2 milliGRAM(s) Oral two times a day  dextrose 5%. 1000 milliLiter(s) (50 mL/Hr) IV Continuous <Continuous>  dextrose 50% Injectable 12.5 Gram(s) IV Push once  dextrose 50% Injectable 25 Gram(s) IV Push once  dextrose 50% Injectable 25 Gram(s) IV Push once  furosemide   Injectable 40 milliGRAM(s) IV Push two times a day  heparin   Injectable 5000 Unit(s) SubCutaneous every 12 hours  influenza   Vaccine 0.5 milliLiter(s) IntraMuscular once  insulin glargine Injectable (LANTUS) 16 Unit(s) SubCutaneous at bedtime  insulin lispro (HumaLOG) corrective regimen sliding scale   SubCutaneous three times a day before meals  lidocaine   Patch 1 Patch Transdermal daily  predniSONE   Tablet 2.5 milliGRAM(s) Oral daily  spironolactone 25 milliGRAM(s) Oral daily    MEDICATIONS  (PRN):  acetaminophen   Tablet .. 650 milliGRAM(s) Oral every 6 hours PRN Temp greater or equal to 38C (100.4F), Mild Pain (1 - 3)  dextrose 40% Gel 15 Gram(s) Oral once PRN Blood Glucose LESS THAN 70 milliGRAM(s)/deciliter  glucagon  Injectable 1 milliGRAM(s) IntraMuscular once PRN Glucose LESS THAN 70 milligrams/deciliter  oxycodone    5 mG/acetaminophen 325 mG 1 Tablet(s) Oral every 4 hours PRN Moderate Pain (4 - 6)        Assessment  85y Female  w/ PAST MEDICAL & SURGICAL HISTORY:  Ascites    Cirrhosis    DM (diabetes mellitus)    CHF (congestive heart failure)    HTN (hypertension)    S/P hernia repair    admitted with complaints of Patient is a 85y old  Female who presents with a chief complaint of right Pleural effusion (13 Oct 2020 10:18)

## 2020-10-13 NOTE — PROGRESS NOTE ADULT - SUBJECTIVE AND OBJECTIVE BOX
[place] [facility] - [facility], [facility]                                   [address] [place] [address]                                                      PHONE: [phone]                                                         FAX: [phone]  [email or web address]  ---------------------------------------------------------------------------------------------------------------------------------    Overnight events/patient complaints: patient seen at bedside. She denies chest pain or SOB.       penicillin (Other)    MEDICATIONS  (STANDING):  albumin human 25% IVPB 100 milliLiter(s) IV Intermittent every 8 hours  amLODIPine   Tablet 5 milliGRAM(s) Oral daily  ATENolol  Tablet 50 milliGRAM(s) Oral two times a day  cloNIDine 0.2 milliGRAM(s) Oral two times a day  dextrose 5%. 1000 milliLiter(s) (50 mL/Hr) IV Continuous <Continuous>  dextrose 50% Injectable 12.5 Gram(s) IV Push once  dextrose 50% Injectable 25 Gram(s) IV Push once  dextrose 50% Injectable 25 Gram(s) IV Push once  furosemide   Injectable 40 milliGRAM(s) IV Push two times a day  heparin   Injectable 5000 Unit(s) SubCutaneous every 12 hours  influenza   Vaccine 0.5 milliLiter(s) IntraMuscular once  insulin glargine Injectable (LANTUS) 16 Unit(s) SubCutaneous at bedtime  insulin lispro (HumaLOG) corrective regimen sliding scale   SubCutaneous three times a day before meals  lidocaine   Patch 1 Patch Transdermal daily  predniSONE   Tablet 2.5 milliGRAM(s) Oral daily  spironolactone 25 milliGRAM(s) Oral daily    MEDICATIONS  (PRN):  acetaminophen   Tablet .. 650 milliGRAM(s) Oral every 6 hours PRN Temp greater or equal to 38C (100.4F), Mild Pain (1 - 3)  dextrose 40% Gel 15 Gram(s) Oral once PRN Blood Glucose LESS THAN 70 milliGRAM(s)/deciliter  glucagon  Injectable 1 milliGRAM(s) IntraMuscular once PRN Glucose LESS THAN 70 milligrams/deciliter  oxycodone    5 mG/acetaminophen 325 mG 1 Tablet(s) Oral every 4 hours PRN Moderate Pain (4 - 6)      Vital Signs Last 24 Hrs  T(C): 36.4 (13 Oct 2020 10:15), Max: 36.6 (13 Oct 2020 07:01)  T(F): 97.5 (13 Oct 2020 10:15), Max: 97.8 (13 Oct 2020 07:01)  HR: 55 (13 Oct 2020 10:15) (55 - 98)  BP: 139/68 (13 Oct 2020 10:15) (139/68 - 198/92)  BP(mean): 128 (12 Oct 2020 17:46) (128 - 128)  RR: 18 (13 Oct 2020 10:15) (18 - 18)  SpO2: 97% (13 Oct 2020 10:15) (95% - 98%)  ICU Vital Signs Last 24 Hrs  BHAVIK BLANDON  I&O's Detail    12 Oct 2020 07:01  -  13 Oct 2020 07:00  --------------------------------------------------------  IN:    IV PiggyBack: 100 mL    Oral Fluid: 240 mL  Total IN: 340 mL    OUT:    Chest Tube (mL): 1380 mL    Voided (mL): 1950 mL  Total OUT: 3330 mL    Total NET: -2990 mL        Drug Dosing Weight  BHAVIK JAMIA      PHYSICAL EXAM:  General: Appears well developed, well nourished alert and cooperative.  HEENT: Head; normocephalic, atraumatic.  Eyes: Pupils reactive, cornea wnl.  Neck: Supple, no nodes adenopathy, no NVD or carotid bruit or thyromegaly.  CARDIOVASCULAR: Normal S1 and S2, No murmur, rub, gallop or lift.   LUNGS: No rales, rhonchi or wheeze. Normal breath sounds bilaterally. right chest tube in place.   ABDOMEN: Soft, nontender without mass or organomegaly. bowel sounds normoactive.  EXTREMITIES: No clubbing, cyanosis or edema. Distal pulses wnl.   SKIN: warm and dry with normal turgor.  NEURO: Alert/oriented x 3/normal motor exam. No pathologic reflexes.    PSYCH: normal affect.        LABS:                        12.3   6.98  )-----------( 144      ( 13 Oct 2020 06:51 )             37.5     10-13    136  |  99  |  77.0<H>  ----------------------------<  196<H>  4.5   |  24.0  |  1.94<H>    Ca    9.3      13 Oct 2020 06:51    TPro  6.5<L>  /  Alb  3.6  /  TBili  0.7  /  DBili  x   /  AST  27  /  ALT  13  /  AlkPhos  123<H>  10-13    BHAVIK BLANDON      PT/INR - ( 13 Oct 2020 06:51 )   PT: 11.6 sec;   INR: 1.00 ratio               RADIOLOGY & ADDITIONAL STUDIES:    INTERPRETATION OF TELEMETRY (personally reviewed): sinus rhythm with mild sinus bradycardia     ASSESSMENT AND PLAN:  Ms Blandon is an 84 y/o woman with hyperlipidemia, type 2 diabetes mellitus, psoriatic arthritis and rheumatoid arthritis, pulmonary hypertension, cirrhosis of liver, chronic large right pleural effusion presumed to be related to her cirrhosis who presents with fall and concerns related to progressively worsening shortness of breath 3 days duration.   CT chest with large right pleural effusion with left shift with complete collapse of the right lung     Dyspnea, Large Right Pleural Effusion, Pulmonary HTN, Acute on Chronic Renal Failure -- s/p thoracentesis/chest tube placement with improvement in respiratory status. Recent echocardiogram showed normal biventricular function without significant valvular dysfunction.  - stop IV Lasix and switch to PO 40 mg daily   - continue spironolactone 25mg   - goal K>4 and Mg>2   - chest tube management per thoracic surgery recommendations    Will sign off now. Please call back if additional cardiac issues arise.

## 2020-10-13 NOTE — PROGRESS NOTE ADULT - SUBJECTIVE AND OBJECTIVE BOX
Pt seen and examined. She states that she is feeling better. Right chest tube still in place.     REVIEW OF SYSTEMS:  Constitutional: No fever, weight loss or fatigue  Cardiovascular: No chest pain, palpitations, dizziness or leg swelling  Gastrointestinal: As noted above. No abdominal or epigastric pain. No nausea, vomiting or hematemesis; No diarrhea or constipation. No melena or hematochezia.  Skin: No itching, burning, rashes or lesions       MEDICATIONS:  MEDICATIONS  (STANDING):  albumin human 25% IVPB 100 milliLiter(s) IV Intermittent every 8 hours  amLODIPine   Tablet 5 milliGRAM(s) Oral daily  ATENolol  Tablet 50 milliGRAM(s) Oral two times a day  cloNIDine 0.2 milliGRAM(s) Oral two times a day  dextrose 5%. 1000 milliLiter(s) (50 mL/Hr) IV Continuous <Continuous>  dextrose 50% Injectable 12.5 Gram(s) IV Push once  dextrose 50% Injectable 25 Gram(s) IV Push once  dextrose 50% Injectable 25 Gram(s) IV Push once  furosemide   Injectable 40 milliGRAM(s) IV Push two times a day  heparin   Injectable 5000 Unit(s) SubCutaneous every 12 hours  influenza   Vaccine 0.5 milliLiter(s) IntraMuscular once  insulin glargine Injectable (LANTUS) 16 Unit(s) SubCutaneous at bedtime  insulin lispro (HumaLOG) corrective regimen sliding scale   SubCutaneous three times a day before meals  lidocaine   Patch 1 Patch Transdermal daily  predniSONE   Tablet 2.5 milliGRAM(s) Oral daily  spironolactone 25 milliGRAM(s) Oral daily    MEDICATIONS  (PRN):  acetaminophen   Tablet .. 650 milliGRAM(s) Oral every 6 hours PRN Temp greater or equal to 38C (100.4F), Mild Pain (1 - 3)  dextrose 40% Gel 15 Gram(s) Oral once PRN Blood Glucose LESS THAN 70 milliGRAM(s)/deciliter  glucagon  Injectable 1 milliGRAM(s) IntraMuscular once PRN Glucose LESS THAN 70 milligrams/deciliter  oxycodone    5 mG/acetaminophen 325 mG 1 Tablet(s) Oral every 4 hours PRN Moderate Pain (4 - 6)      Allergies    penicillin (Other)    Intolerances        Vital Signs Last 24 Hrs  T(C): 36.6 (13 Oct 2020 07:01), Max: 36.6 (13 Oct 2020 07:01)  T(F): 97.8 (13 Oct 2020 07:01), Max: 97.8 (13 Oct 2020 07:01)  HR: 59 (13 Oct 2020 07:01) (56 - 98)  BP: 183/76 (13 Oct 2020 07:01) (140/71 - 198/92)  BP(mean): 128 (12 Oct 2020 17:46) (128 - 128)  RR: 18 (13 Oct 2020 07:01) (18 - 18)  SpO2: 98% (13 Oct 2020 07:01) (95% - 98%)    10-12 @ 07:01  -  10-13 @ 07:00  --------------------------------------------------------  IN: 340 mL / OUT: 3330 mL / NET: -2990 mL        PHYSICAL EXAM:    Right chest tube with diminished breath sounds on right. Left lung is clear. Abdomen is benign.    LABS:  CBC Full  -  ( 13 Oct 2020 06:51 )  WBC Count : 6.98 K/uL  RBC Count : 3.97 M/uL  Hemoglobin : 12.3 g/dL  Hematocrit : 37.5 %  Platelet Count - Automated : 144 K/uL  Mean Cell Volume : 94.5 fl  Mean Cell Hemoglobin : 31.0 pg  Mean Cell Hemoglobin Concentration : 32.8 gm/dL  Auto Neutrophil # : 4.73 K/uL  Auto Lymphocyte # : 1.08 K/uL  Auto Monocyte # : 0.89 K/uL  Auto Eosinophil # : 0.24 K/uL  Auto Basophil # : 0.02 K/uL  Auto Neutrophil % : 67.7 %  Auto Lymphocyte % : 15.5 %  Auto Monocyte % : 12.8 %  Auto Eosinophil % : 3.4 %  Auto Basophil % : 0.3 %    10-13    136  |  99  |  77.0<H>  ----------------------------<  196<H>  4.5   |  24.0  |  1.94<H>    Ca    9.3      13 Oct 2020 06:51    TPro  6.5<L>  /  Alb  3.6  /  TBili  0.7  /  DBili  x   /  AST  27  /  ALT  13  /  AlkPhos  123<H>  10-13    PT/INR - ( 13 Oct 2020 06:51 )   PT: 11.6 sec;   INR: 1.00 ratio                           RADIOLOGY & ADDITIONAL STUDIES (The following images were personally reviewed):

## 2020-10-13 NOTE — PROGRESS NOTE ADULT - PROBLEM SELECTOR PLAN 1
Maintain PTC to WS  record output per shift  Daily CXR  IS/OOB  pain control   agree with diuresis as tolerated     Shelia Cevallos PA  Thoracic Sx

## 2020-10-13 NOTE — PROGRESS NOTE ADULT - ASSESSMENT
84yo female with PMH of CHF, cirrhosis, HTN, DM, and A fib (no AC) found to have right pleural effusion. S/P R PTC placed 10/9. Transudate. Cytology negative. Cultures NGTD. CXR with right trapped lung.

## 2020-10-13 NOTE — PROGRESS NOTE ADULT - SUBJECTIVE AND OBJECTIVE BOX
NEPHROLOGY INTERVAL HPI/OVERNIGHT EVENTS:    Examined  No distress  Feeling better  Denies HA CP N/V/D no SOB    MEDICATIONS  (STANDING):  albumin human 25% IVPB 100 milliLiter(s) IV Intermittent every 8 hours  amLODIPine   Tablet 5 milliGRAM(s) Oral daily  ATENolol  Tablet 50 milliGRAM(s) Oral two times a day  cloNIDine 0.2 milliGRAM(s) Oral two times a day  dextrose 5%. 1000 milliLiter(s) (50 mL/Hr) IV Continuous <Continuous>  dextrose 50% Injectable 12.5 Gram(s) IV Push once  dextrose 50% Injectable 25 Gram(s) IV Push once  dextrose 50% Injectable 25 Gram(s) IV Push once  furosemide    Tablet 20 milliGRAM(s) Oral daily  heparin   Injectable 5000 Unit(s) SubCutaneous every 12 hours  influenza   Vaccine 0.5 milliLiter(s) IntraMuscular once  insulin glargine Injectable (LANTUS) 16 Unit(s) SubCutaneous at bedtime  insulin lispro (HumaLOG) corrective regimen sliding scale   SubCutaneous three times a day before meals  lidocaine   Patch 1 Patch Transdermal daily  predniSONE   Tablet 2.5 milliGRAM(s) Oral daily  spironolactone 25 milliGRAM(s) Oral daily    MEDICATIONS  (PRN):  acetaminophen   Tablet .. 650 milliGRAM(s) Oral every 6 hours PRN Temp greater or equal to 38C (100.4F), Mild Pain (1 - 3)  dextrose 40% Gel 15 Gram(s) Oral once PRN Blood Glucose LESS THAN 70 milliGRAM(s)/deciliter  glucagon  Injectable 1 milliGRAM(s) IntraMuscular once PRN Glucose LESS THAN 70 milligrams/deciliter  oxycodone    5 mG/acetaminophen 325 mG 1 Tablet(s) Oral every 4 hours PRN Moderate Pain (4 - 6)      Allergies    penicillin (Other)    Intolerances        Vital Signs Last 24 Hrs  T(C): 36.4 (13 Oct 2020 10:15), Max: 36.6 (13 Oct 2020 07:01)  T(F): 97.5 (13 Oct 2020 10:15), Max: 97.8 (13 Oct 2020 07:01)  HR: 63 (13 Oct 2020 12:39) (55 - 98)  BP: 144/70 (13 Oct 2020 12:39) (139/68 - 198/92)  BP(mean): 128 (12 Oct 2020 17:46) (128 - 128)  RR: 18 (13 Oct 2020 10:15) (18 - 18)  SpO2: 97% (13 Oct 2020 10:15) (95% - 98%)  Daily     Daily Weight in k.1 (13 Oct 2020 06:18)    PHYSICAL EXAM:  GENERAL: NAD  HEAD:  NCAT  EYES: EOMI  NECK: Supple, neck  veins full  NERVOUS SYSTEM:  Alert & Oriented X3  CHEST/LUNG: Clear to percussion bilaterally  HEART: Regular rate and rhythm  ABDOMEN: Soft, Nontender, Nondistended; Bowel sounds present  EXTREMITIES: trace edema improved    LABS:                        12.3   6.98  )-----------( 144      ( 13 Oct 2020 06:51 )             37.5     10-13    136  |  99  |  77.0<H>  ----------------------------<  196<H>  4.5   |  24.0  |  1.94<H>    Ca    9.3      13 Oct 2020 06:51    TPro  6.5<L>  /  Alb  3.6  /  TBili  0.7  /  DBili  x   /  AST  27  /  ALT  13  /  AlkPhos  123<H>  10-13    PT/INR - ( 13 Oct 2020 06:51 )   PT: 11.6 sec;   INR: 1.00 ratio                     RADIOLOGY & ADDITIONAL TESTS:

## 2020-10-13 NOTE — PROGRESS NOTE ADULT - ASSESSMENT
85 year old F with PMH of HTN, CHF, cirrhosis with ascites, HLD, DM, AFib (no A/C) presented to ED s/p fall, reported as mechanical fall, tripped and fell, denies loss of consciousness or head injury. Patient stated she had worsening shortness of breath for the past 3 days.  Acute hypoxic respiratory failure due to right pleural effusion, likely hepatic hydrothorax   -CXR showed large right side pleural effusion  - S/p chest tube, follow up cytology results  - CTS, Pulm following  - Supplemental oxygen via NC prn  - Daily CXR  Volume overload 2/2 to cirrhosis, improved  - Cardiology consulted, recs appreciated. Cardiology signed off.   - GI consulted, recs apprecitated  - Strict I&Os, daily weights, fluid/salt restriction.  - Lasix IV switched to PO.  - C/w Spirinolactone 25mg daily  - Telemetry monitoring  - S/p albumin, as per GI no need for paracentesis at this time due to small amt of ascites.     DEVYN vs CKD, improved  - Nephro consulted, recs appreciated  - Monitor BMP, correct electrolytes as needed.    HTN  - C/w with Lasix, spironolactone 25mg and Clonidine.  - Norvasc 5mg daily started     IDDM  - Increase Lantus to 20u qhs  -ISS, hypoglycemic protocol, FSG     Arthritis   -cont with prednisone     Severe protein calorie malnutrition  - Supplementation    DVT ppx   - Heparin sq  Dispo: Pending clinical course, removal of CT. Palliative following. Possible home hospice.    Family (Daughter, Saba) updated  regarding patient's clinical condition. They were explained that pt clinically improving however pt's prognosis is still poor as she has cirrhosis and her medical comorbidities. Palliative following, discussions involving home hospice. Pt desires not to be in and out of the hospital and does not want any aggressive measures.    85 year old F with PMH of HTN, CHF, cirrhosis with ascites, HLD, DM, AFib (no A/C) presented to ED s/p fall, reported as mechanical fall, tripped and fell, denies loss of consciousness or head injury. Patient stated she had worsening shortness of breath for the past 3 days.  Acute hypoxic respiratory failure due to right pleural effusion, likely hepatic hydrothorax   -CXR showed large right side pleural effusion  - S/p chest tube, follow up cytology results  - CTS, Pulm following  - Supplemental oxygen via NC prn  - Daily CXR  Volume overload 2/2 to cirrhosis, improved  - Cardiology consulted, recs appreciated. Cardiology signed off.   - GI consulted, recs apprecitated  - Strict I&Os, daily weights, fluid/salt restriction.  - Lasix IV switched to PO.  - C/w Spirinolactone 25mg daily  - Telemetry monitoring  - S/p albumin, as per GI no need for paracentesis at this time due to small amt of ascites.     DEVYN vs CKD, improved  - Nephro consulted, recs appreciated  - Monitor BMP, correct electrolytes as needed.    HTN  - C/w with Lasix, spironolactone 25mg and Clonidine 0.2mg BID. Losartan 25mg daily started by Nephrology.  - Norvasc 5mg daily started     IDDM  - Increase Lantus to 20u qhs  -ISS, hypoglycemic protocol, FSG     Arthritis   -cont with prednisone     Severe protein calorie malnutrition  - Supplementation    DVT ppx   - Heparin sq  Dispo: Pending clinical course, removal of CT. Palliative following. Possible home hospice.    Family (Daughter, Saba) updated  regarding patient's clinical condition. They were explained that pt clinically improving however pt's prognosis is still poor as she has cirrhosis and her medical comorbidities. Palliative following, discussions involving home hospice. Pt desires not to be in and out of the hospital and does not want any aggressive measures.

## 2020-10-13 NOTE — PROGRESS NOTE ADULT - ASSESSMENT
DEVYN on CKD suspect stage III  Cr 2.2--> 1.9  improving   Baseline cr seems to be approx 1.8  Has h/o HTN, CHF, cirrhosis with ascites, HLD, AFib (no A/C)   DM poor control A1C 8.5  presented to ED s/p fall and SOB  Clinically hypervolemic 2/2 cirrosis  Large R leural effusion s/p CT   Agree w Lasix 20mg Q day / Aldactone 25 mg Q day  Plans for abd paracentesis after renal optimazation- still required?   Edema coarse of albumin   Urine Na elevated  Renal sonogram - noted no hydronephrosis  24 hr urine study- 2100 mg proteinuria suspect 2/2 DM - will start ARB  Cards/ Pulm/ GI evals notes    Will follow

## 2020-10-14 LAB
CULTURE RESULTS: SIGNIFICANT CHANGE UP
GLUCOSE BLDC GLUCOMTR-MCNC: 111 MG/DL — HIGH (ref 70–99)
GLUCOSE BLDC GLUCOMTR-MCNC: 147 MG/DL — HIGH (ref 70–99)
GLUCOSE BLDC GLUCOMTR-MCNC: 191 MG/DL — HIGH (ref 70–99)
GLUCOSE BLDC GLUCOMTR-MCNC: 243 MG/DL — HIGH (ref 70–99)
GLUCOSE BLDC GLUCOMTR-MCNC: 271 MG/DL — HIGH (ref 70–99)
SPECIMEN SOURCE: SIGNIFICANT CHANGE UP

## 2020-10-14 PROCEDURE — 99233 SBSQ HOSP IP/OBS HIGH 50: CPT

## 2020-10-14 PROCEDURE — 99231 SBSQ HOSP IP/OBS SF/LOW 25: CPT

## 2020-10-14 PROCEDURE — 99232 SBSQ HOSP IP/OBS MODERATE 35: CPT

## 2020-10-14 PROCEDURE — 71045 X-RAY EXAM CHEST 1 VIEW: CPT | Mod: 26

## 2020-10-14 PROCEDURE — 99497 ADVNCD CARE PLAN 30 MIN: CPT | Mod: 25

## 2020-10-14 RX ORDER — INSULIN LISPRO 100/ML
3 VIAL (ML) SUBCUTANEOUS
Refills: 0 | Status: DISCONTINUED | OUTPATIENT
Start: 2020-10-14 | End: 2020-10-15

## 2020-10-14 RX ORDER — AMLODIPINE BESYLATE 2.5 MG/1
10 TABLET ORAL DAILY
Refills: 0 | Status: DISCONTINUED | OUTPATIENT
Start: 2020-10-14 | End: 2020-10-15

## 2020-10-14 RX ADMIN — INSULIN GLARGINE 20 UNIT(S): 100 INJECTION, SOLUTION SUBCUTANEOUS at 23:01

## 2020-10-14 RX ADMIN — Medication 20 MILLIGRAM(S): at 06:36

## 2020-10-14 RX ADMIN — HEPARIN SODIUM 5000 UNIT(S): 5000 INJECTION INTRAVENOUS; SUBCUTANEOUS at 22:59

## 2020-10-14 RX ADMIN — ATENOLOL 50 MILLIGRAM(S): 25 TABLET ORAL at 17:41

## 2020-10-14 RX ADMIN — Medication 0.2 MILLIGRAM(S): at 17:41

## 2020-10-14 RX ADMIN — Medication 2.5 MILLIGRAM(S): at 05:30

## 2020-10-14 RX ADMIN — Medication 2: at 12:46

## 2020-10-14 RX ADMIN — AMLODIPINE BESYLATE 5 MILLIGRAM(S): 2.5 TABLET ORAL at 05:32

## 2020-10-14 RX ADMIN — Medication 50 MILLILITER(S): at 05:35

## 2020-10-14 RX ADMIN — Medication 0.2 MILLIGRAM(S): at 06:35

## 2020-10-14 RX ADMIN — AMLODIPINE BESYLATE 10 MILLIGRAM(S): 2.5 TABLET ORAL at 11:50

## 2020-10-14 RX ADMIN — LOSARTAN POTASSIUM 25 MILLIGRAM(S): 100 TABLET, FILM COATED ORAL at 05:31

## 2020-10-14 RX ADMIN — Medication 3: at 10:11

## 2020-10-14 RX ADMIN — SPIRONOLACTONE 25 MILLIGRAM(S): 25 TABLET, FILM COATED ORAL at 06:34

## 2020-10-14 RX ADMIN — Medication 3 UNIT(S): at 17:41

## 2020-10-14 NOTE — PROGRESS NOTE ADULT - ASSESSMENT
85 yr woman hx of cirrhosis, CHF, afib admitted after a mechanical fall  with sx of SOB found to have large right pleural effusion associated with HF, cirrhosis  and DEVYN likely on CKD     Problem/Recommendation - 1:  Problem: Pleural effusion. 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  cytology negative  CTS following       Problem/Recommendation - 2:  ·  Problem: Cirrhosis.  Recommendation: with Ascites  Noted GI input- continuing with salt poor albumin.      Problem/Recommendation - 3:  ·  Problem: DEVYN (acute kidney injury).  Recommendation: Renal following.  cont medical optimization  patient states would NOT want dialysis.      Problem/Recommendation - 4:  ·  Problem: CHF (congestive heart failure).  Recommendation:   cont Spironolactone and Lasix.      Problem/Recommendation - 5:  ·  Problem: Encounter for palliative care.  Recommendation:  See GOC note for details  1. DNR/I  MOLST completed   2.  Home hospice. Daughter may be considering rehab  before going home.  Patient  wishes to go home.

## 2020-10-14 NOTE — DIETITIAN INITIAL EVALUATION ADULT. - OTHER INFO
Pt with h/o HTN, CHF, cirrhosis with ascites, HLD, DM, AFib (no A/C) presented to ED s/p fall, reported as mechanical fall, tripped and fell, denies loss of consciousness or head injury. Pt stated worsening SOB for the past 3 days.  Pt being treated for acute hypoxic respiratory failure due to right pleural effusion, likely hepatic hydrothorax.  Pt reports mostly good po intake at meals- states this is the most she has consumed in a very long time.  Pt states poor appetite prior to admission with significant wt loss, but unable to determine how much/time frame.  Discussed importance of consuming adequate protein intake at meals to optimize nutrition status. Pt receptive and verbalized understanding.

## 2020-10-14 NOTE — DIETITIAN INITIAL EVALUATION ADULT. - PERTINENT LABORATORY DATA
10-13 Na136 mmol/L Glu 196 mg/dL<H> K+ 4.5 mmol/L Cr  1.94 mg/dL<H> BUN 77.0 mg/dL<H> Phos n/a   Alb 3.6 g/dL PAB n/a

## 2020-10-14 NOTE — PROGRESS NOTE ADULT - SUBJECTIVE AND OBJECTIVE BOX
CC:  follow up GOC  INTERVAL HPI/OVERNIGHT EVENTS:  refused PT  PRESENT SYMPTOMS: SOURCE:  Patient/Family/Team    PAIN SCALE:  0 = none  1 = mild   2 = moderate  3 = severe    Pain: 1    Dyspnea:  [ ] YES [x ] NO  Anxiety:  [ ] YES [ x] NO  Fatigue: [ x] YES [ ] NO  Nausea: [ ] YES [x ] NO  Loss of Appetite: [ x] YES [ ] NO  Other symptoms: __________    MEDICATIONS  (STANDING):  amLODIPine   Tablet 10 milliGRAM(s) Oral daily  ATENolol  Tablet 50 milliGRAM(s) Oral two times a day  cloNIDine 0.2 milliGRAM(s) Oral two times a day  dextrose 5%. 1000 milliLiter(s) (50 mL/Hr) IV Continuous <Continuous>  dextrose 50% Injectable 12.5 Gram(s) IV Push once  dextrose 50% Injectable 25 Gram(s) IV Push once  dextrose 50% Injectable 25 Gram(s) IV Push once  furosemide    Tablet 20 milliGRAM(s) Oral daily  heparin   Injectable 5000 Unit(s) SubCutaneous every 12 hours  influenza   Vaccine 0.5 milliLiter(s) IntraMuscular once  insulin glargine Injectable (LANTUS) 20 Unit(s) SubCutaneous at bedtime  insulin lispro (HumaLOG) corrective regimen sliding scale   SubCutaneous three times a day before meals  insulin lispro Injectable (HumaLOG) 3 Unit(s) SubCutaneous three times a day before meals  lidocaine   Patch 1 Patch Transdermal daily  losartan 25 milliGRAM(s) Oral daily  predniSONE   Tablet 2.5 milliGRAM(s) Oral daily  spironolactone 25 milliGRAM(s) Oral daily    MEDICATIONS  (PRN):  acetaminophen   Tablet .. 650 milliGRAM(s) Oral every 6 hours PRN Temp greater or equal to 38C (100.4F), Mild Pain (1 - 3)  dextrose 40% Gel 15 Gram(s) Oral once PRN Blood Glucose LESS THAN 70 milliGRAM(s)/deciliter  glucagon  Injectable 1 milliGRAM(s) IntraMuscular once PRN Glucose LESS THAN 70 milligrams/deciliter  oxycodone    5 mG/acetaminophen 325 mG 1 Tablet(s) Oral every 4 hours PRN Moderate Pain (4 - 6)      Allergies    penicillin (Other)    Intolerances      Karnofsky Performance Score/Palliative Performance Status Version 2:  30   %    Vital Signs Last 24 Hrs  T(C): 36.3 (14 Oct 2020 11:28), Max: 36.7 (14 Oct 2020 05:22)  T(F): 97.4 (14 Oct 2020 11:28), Max: 98 (14 Oct 2020 05:22)  HR: 77 (14 Oct 2020 11:28) (59 - 77)  BP: 153/74 (14 Oct 2020 11:28) (132/68 - 180/80)  BP(mean): --  RR: 18 (14 Oct 2020 05:22) (18 - 18)  SpO2: 95% (14 Oct 2020 11:28) (95% - 98%)    PHYSICAL EXAM:    General: Elderly thin woman AOx4 NAD    HEENT: [x ] normal  [ ] dry mouth  [ ] ET tube/trach    Lungs: [x ] comfortable [ ] tachypnea/labored breathing  [ ] excessive secretions    CV: [x ] normal  [ ] tachycardia    GI: softly distended NT  : [ x] normal  [ ] incontinent  [ ] oliguria/anuria  [ ] hdez    MSK: [ ] normal  [x ] weakness  [ ] edema             [ ] ambulatory  [x ] bedbound/wheelchair bound    Skin: [ ] normal  [ ] pressure ulcers- Stage_____  [ x] no rash    LABS:                        12.3   6.98  )-----------( 144      ( 13 Oct 2020 06:51 )             37.5     10-13    136  |  99  |  77.0<H>  ----------------------------<  196<H>  4.5   |  24.0  |  1.94<H>    Ca    9.3      13 Oct 2020 06:51    TPro  6.5<L>  /  Alb  3.6  /  TBili  0.7  /  DBili  x   /  AST  27  /  ALT  13  /  AlkPhos  123<H>  10-13    PT/INR - ( 13 Oct 2020 06:51 )   PT: 11.6 sec;   INR: 1.00 ratio             I&O's Summary    13 Oct 2020 07:01  -  14 Oct 2020 07:00  --------------------------------------------------------  IN: 700 mL / OUT: 1550 mL / NET: -850 mL    14 Oct 2020 07:01  -  14 Oct 2020 15:54  --------------------------------------------------------  IN: 340 mL / OUT: 0 mL / NET: 340 mL        RADIOLOGY & ADDITIONAL STUDIES:

## 2020-10-14 NOTE — PROGRESS NOTE ADULT - PROBLEM SELECTOR PLAN 1
maintain pigtail right  Further care as per medicine team maintain pigtail right  Further care as per medicine team    - Home hospice is being considered by pt and her daughter

## 2020-10-14 NOTE — PROGRESS NOTE ADULT - ASSESSMENT
DEVYN on CKD suspect stage III  Cr 2.2--> 1.9  improving - awaiting today labs  Baseline cr seems to be approx 1.8  Has h/o HTN, CHF, cirrhosis with ascites, HLD, AFib (no A/C)   DM poor control A1C 8.5  presented to ED s/p fall and SOB  Clinically hypervolemic 2/2 cirrosis  Large R leural effusion s/p CT   Agree w Lasix 20mg Q day / Aldactone 25 mg Q day  No paracentesis for now small amount of ascites  Edema s/p coarse of albumin   Urine Na elevated  Renal sonogram - noted no hydronephrosis  24 hr urine study- 2100 mg proteinuria suspect 2/2 DM - will start ARB  Cards/ Pulm/ GI evals notes    Will follow

## 2020-10-14 NOTE — PROGRESS NOTE ADULT - ASSESSMENT
85 year old F with PMH of HTN, CHF, cirrhosis with ascites, HLD, DM, AFib (no A/C) presented to ED s/p fall, reported as mechanical fall, tripped and fell, denies loss of consciousness or head injury. Patient stated she had worsening shortness of breath for the past 3 days.  Acute hypoxic respiratory failure due to right pleural effusion, likely hepatic hydrothorax, improved. trapped lung  -CXR showed large right side pleural effusion  - S/p chest tube, cytology negative for malignancy.  - CTS, Pulm consulted. Recs appreciated  - Supplemental oxygen via NC prn  - Daily CXR  - CT care per CTS. Possible removal tomorrow.  Volume overload 2/2 to cirrhosis, improved  - Cardiology consulted, recs appreciated. Cardiology signed off.   - GI consulted, recs apprecitated  - Strict I&Os, daily weights, fluid/salt restriction.  - Lasix IV switched to PO.  - C/w Spirinolactone 25mg daily  - Telemetry monitoring  - S/p albumin, as per GI no need for paracentesis at this time due to small amt of ascites.     DEVYN vs CKD, improved  - Nephro consulted, recs appreciated  - Monitor BMP, correct electrolytes as needed.    HTN  - C/w with Lasix 20mg PO, spironolactone 25mg and Clonidine 0.2mg BID. Losartan 25mg daily started by Nephrology.  - Increase Norvasc to 10mg daily     IDDM  - Pt on Lantus 20u qhs  - Start Lispro 3u TIDAC with meals  -ISS, hypoglycemic protocol, FSG     Arthritis   -cont with prednisone     Severe protein calorie malnutrition  - Supplementation  DVT ppx   - Heparin sq  Dispo: Home with home hospice once CT removed w/ palliative management of sx vs Pleurex placement per CTS.   Family (Daughter, Saba) updated  regarding patient's clinical condition at bedside. Patient and family desire home with home hospice with no aggressive measures. Pt made DNR/DNI.

## 2020-10-14 NOTE — PROGRESS NOTE ADULT - SUBJECTIVE AND OBJECTIVE BOX
NEPHROLOGY INTERVAL HPI/OVERNIGHT EVENTS:    Examined  No distress  Feeling better  Denies HA CP N/V/D no SOB    MEDICATIONS  (STANDING):  amLODIPine   Tablet 10 milliGRAM(s) Oral daily  ATENolol  Tablet 50 milliGRAM(s) Oral two times a day  cloNIDine 0.2 milliGRAM(s) Oral two times a day  dextrose 5%. 1000 milliLiter(s) (50 mL/Hr) IV Continuous <Continuous>  dextrose 50% Injectable 12.5 Gram(s) IV Push once  dextrose 50% Injectable 25 Gram(s) IV Push once  dextrose 50% Injectable 25 Gram(s) IV Push once  furosemide    Tablet 20 milliGRAM(s) Oral daily  heparin   Injectable 5000 Unit(s) SubCutaneous every 12 hours  influenza   Vaccine 0.5 milliLiter(s) IntraMuscular once  insulin glargine Injectable (LANTUS) 20 Unit(s) SubCutaneous at bedtime  insulin lispro (HumaLOG) corrective regimen sliding scale   SubCutaneous three times a day before meals  lidocaine   Patch 1 Patch Transdermal daily  losartan 25 milliGRAM(s) Oral daily  predniSONE   Tablet 2.5 milliGRAM(s) Oral daily  spironolactone 25 milliGRAM(s) Oral daily    MEDICATIONS  (PRN):  acetaminophen   Tablet .. 650 milliGRAM(s) Oral every 6 hours PRN Temp greater or equal to 38C (100.4F), Mild Pain (1 - 3)  dextrose 40% Gel 15 Gram(s) Oral once PRN Blood Glucose LESS THAN 70 milliGRAM(s)/deciliter  glucagon  Injectable 1 milliGRAM(s) IntraMuscular once PRN Glucose LESS THAN 70 milligrams/deciliter  oxycodone    5 mG/acetaminophen 325 mG 1 Tablet(s) Oral every 4 hours PRN Moderate Pain (4 - 6)      Allergies    penicillin (Other)    Intolerances        Vital Signs Last 24 Hrs  T(C): 36.3 (14 Oct 2020 11:28), Max: 36.7 (14 Oct 2020 05:22)  T(F): 97.4 (14 Oct 2020 11:28), Max: 98 (14 Oct 2020 05:22)  HR: 77 (14 Oct 2020 11:28) (53 - 77)  BP: 153/74 (14 Oct 2020 11:28) (120/65 - 180/80)  BP(mean): --  RR: 18 (14 Oct 2020 05:22) (18 - 18)  SpO2: 95% (14 Oct 2020 11:28) (95% - 98%)  Daily     Daily Weight in k.9 (14 Oct 2020 10:44)    PHYSICAL EXAM:  GENERAL: NAD  HEAD:  NCAT  EYES: EOMI  NECK: Supple, neck  veins full  NERVOUS SYSTEM:  Alert & Oriented X3  CHEST/LUNG: Clear to percussion bilaterally  HEART: Regular rate and rhythm  ABDOMEN: Soft, Nontender, Nondistended; Bowel sounds present  EXTREMITIES: trace edema improved    LABS:                        12.3   6.98  )-----------( 144      ( 13 Oct 2020 06:51 )             37.5     10    136  |  99  |  77.0<H>  ----------------------------<  196<H>  4.5   |  24.0  |  1.94<H>    Ca    9.3      13 Oct 2020 06:51    TPro  6.5<L>  /  Alb  3.6  /  TBili  0.7  /  DBili  x   /  AST  27  /  ALT  13  /  AlkPhos  123<H>  10-13    PT/INR - ( 13 Oct 2020 06:51 )   PT: 11.6 sec;   INR: 1.00 ratio                     RADIOLOGY & ADDITIONAL TESTS:

## 2020-10-14 NOTE — PROGRESS NOTE ADULT - SUBJECTIVE AND OBJECTIVE BOX
[facility] Division of Hospital Medicine  Harlan Busch [phone]    Chief Complaint:  Patient is a 85y old  Female who presents with a chief complaint of right Pleural effusion (10 Oct 2020 13:17)      SUBJECTIVE / OVERNIGHT EVENTS:  Patient seen and examined at bedside. No acute events reported overnight. Patient complains of fatigue, however no new complaints.    MEDICATIONS  (STANDING):  ATENolol  Tablet 50 milliGRAM(s) Oral two times a day  cloNIDine 0.2 milliGRAM(s) Oral two times a day  dextrose 5%. 1000 milliLiter(s) (50 mL/Hr) IV Continuous <Continuous>  dextrose 50% Injectable 12.5 Gram(s) IV Push once  dextrose 50% Injectable 25 Gram(s) IV Push once  dextrose 50% Injectable 25 Gram(s) IV Push once  furosemide   Injectable 40 milliGRAM(s) IV Push two times a day  heparin   Injectable 5000 Unit(s) SubCutaneous every 12 hours  insulin glargine Injectable (LANTUS) 10 Unit(s) SubCutaneous at bedtime  insulin lispro (HumaLOG) corrective regimen sliding scale   SubCutaneous three times a day before meals  lidocaine   Patch 1 Patch Transdermal daily  predniSONE   Tablet 2.5 milliGRAM(s) Oral daily  spironolactone 25 milliGRAM(s) Oral daily    MEDICATIONS  (PRN):  acetaminophen   Tablet .. 650 milliGRAM(s) Oral every 6 hours PRN Temp greater or equal to 38C (100.4F), Mild Pain (1 - 3)  dextrose 40% Gel 15 Gram(s) Oral once PRN Blood Glucose LESS THAN 70 milliGRAM(s)/deciliter  glucagon  Injectable 1 milliGRAM(s) IntraMuscular once PRN Glucose LESS THAN 70 milligrams/deciliter  oxycodone    5 mG/acetaminophen 325 mG 1 Tablet(s) Oral every 4 hours PRN Moderate Pain (4 - 6)        I&O's Summary    09 Oct 2020 07:  -  10 Oct 2020 07:00  --------------------------------------------------------  IN: 0 mL / OUT: 5440 mL / NET: -5440 mL    10 Oct 2020 07:  -  10 Oct 2020 14:09  --------------------------------------------------------  IN: 0 mL / OUT: 500 mL / NET: -500 mL        PHYSICAL EXAM:  Vital Signs Last 24 Hrs  T(C): 36.6 (10 Oct 2020 11:30), Max: 36.6 (10 Oct 2020 11:30)  T(F): 97.9 (10 Oct 2020 11:30), Max: 97.9 (10 Oct 2020 11:30)  HR: 60 (10 Oct 2020 11:30) (56 - 76)  BP: 102/61 (10 Oct 2020 11:30) (102/61 - 184/68)  BP(mean): --  RR: 18 (10 Oct 2020 11:30) (18 - 19)  SpO2: 97% (10 Oct 2020 11:30) (94% - 99%)        CONSTITUTIONAL: Elderly female, frail, in NAD, pleasant.  HEENT: NC/AT, PERRL, no JVD  RESPIRATORY: Decreased breath sounds R lung  CARDIOVASCULAR: RRR, S1/S2+, no m/g/r  ABDOMEN: Mildly distended abdomen, non-tender, BS+  MUSCLOSKELETAL: No edema, cyanosis, deformities  PSYCH: A+O to person, place, and time; affect appropriate  NEUROLOGY: CN 2-12 are intact and symmetric; no gross neurological deficits.  SKIN: No rashes; no palpable lesions  VASC: Distal pulses palpable    LABS:                        12.0   8.91  )-----------( 135      ( 10 Oct 2020 03:17 )             37.1     10-10    137  |  101  |  61.0<H>  ----------------------------<  260<H>  5.1   |  25.0  |  1.99<H>    Ca    8.6      10 Oct 2020 03:17  Phos  4.0     10-10  Mg     1.9     10-10    TPro  6.8  /  Alb  2.8<L>  /  TBili  0.6  /  DBili  x   /  AST  35<H>  /  ALT  19  /  AlkPhos  163<H>  10-09    PT/INR - ( 09 Oct 2020 01:49 )   PT: 11.9 sec;   INR: 1.03 ratio         PTT - ( 09 Oct 2020 01:49 )  PTT:29.7 sec  CARDIAC MARKERS ( 09 Oct 2020 01:49 )  x     / 0.03 ng/mL / x     / x     / x          Urinalysis Basic - ( 09 Oct 2020 06:26 )    Color: Yellow / Appearance: Clear / S.010 / pH: x  Gluc: x / Ketone: Negative  / Bili: Negative / Urobili: Negative mg/dL   Blood: x / Protein: 100 mg/dL / Nitrite: Negative   Leuk Esterase: Negative / RBC: 11-25 /HPF / WBC 3-5   Sq Epi: x / Non Sq Epi: Occasional / Bacteria: Few        Culture - Body Fluid with Gram Stain (collected 09 Oct 2020 16:33)  Source: .Body Fluid Pleural Fluid  Gram Stain (09 Oct 2020 19:22):    No polymorphonuclear cells seen    No organisms seen    by cytocentrifuge  Preliminary Report (10 Oct 2020 12:47):    No growth      CAPILLARY BLOOD GLUCOSE      POCT Blood Glucose.: 273 mg/dL (10 Oct 2020 11:27)  POCT Blood Glucose.: 259 mg/dL (10 Oct 2020 08:12)  POCT Blood Glucose.: 221 mg/dL (09 Oct 2020 16:16)        RADIOLOGY & ADDITIONAL TESTS:  Results Reviewed:   Imaging Personally Reviewed:  Electrocardiogram Personally Reviewed:

## 2020-10-14 NOTE — CHART NOTE - TREATMENT: THE FOLLOWING DIET HAS BEEN RECOMMENDED
Diet, Consistent Carbohydrate/No Snacks:   DASH/TLC {Sodium & Cholesterol Restricted} (DASH)  1000mL Fluid Restriction ([id number]) (10-09-20 @ 10:16) [Active]    RX: Glucerna BID

## 2020-10-14 NOTE — PROGRESS NOTE ADULT - ASSESSMENT
85 female with R effusion with multiple possible etiologies including CHF, cirrhosis, malnutrition, ? underlying malignancy.  Pneumothorax on CXR likely trapped lung.  Daily CXR.  CT Chest as per pulmonology.  10/12 Maintain R pigtail water-seal  10/14 pt still with high-output from chest tube. WIll discuss possibility of removal in AM. 85 female with R effusion with multiple possible etiologies including CHF, cirrhosis, malnutrition, ? underlying malignancy.  Pneumothorax on CXR likely trapped lung.  Daily CXR.  CT Chest as per pulmonology.  10/12 Maintain R pigtail water-seal  10/14 pt still with high-output from chest tube. Will discuss possibility of removal in AM with palliative home hospice vs pleurx catheter placement  .

## 2020-10-14 NOTE — PROGRESS NOTE ADULT - SUBJECTIVE AND OBJECTIVE BOX
PULMONARY PROGRESS NOTE      NEO BLANDON-[id number]    Patient is a 85y old  Female who presents with a chief complaint of right Pleural effusion (14 Oct 2020 12:37)      INTERVAL HPI/OVERNIGHT EVENTS: No resp distress; denies sob; main complaint is pain at chest tube site and she wants to go home.     MEDICATIONS  (STANDING):  amLODIPine   Tablet 10 milliGRAM(s) Oral daily  ATENolol  Tablet 50 milliGRAM(s) Oral two times a day  cloNIDine 0.2 milliGRAM(s) Oral two times a day  dextrose 5%. 1000 milliLiter(s) (50 mL/Hr) IV Continuous <Continuous>  dextrose 50% Injectable 12.5 Gram(s) IV Push once  dextrose 50% Injectable 25 Gram(s) IV Push once  dextrose 50% Injectable 25 Gram(s) IV Push once  furosemide    Tablet 20 milliGRAM(s) Oral daily  heparin   Injectable 5000 Unit(s) SubCutaneous every 12 hours  influenza   Vaccine 0.5 milliLiter(s) IntraMuscular once  insulin glargine Injectable (LANTUS) 20 Unit(s) SubCutaneous at bedtime  insulin lispro (HumaLOG) corrective regimen sliding scale   SubCutaneous three times a day before meals  lidocaine   Patch 1 Patch Transdermal daily  losartan 25 milliGRAM(s) Oral daily  predniSONE   Tablet 2.5 milliGRAM(s) Oral daily  spironolactone 25 milliGRAM(s) Oral daily      MEDICATIONS  (PRN):  acetaminophen   Tablet .. 650 milliGRAM(s) Oral every 6 hours PRN Temp greater or equal to 38C (100.4F), Mild Pain (1 - 3)  dextrose 40% Gel 15 Gram(s) Oral once PRN Blood Glucose LESS THAN 70 milliGRAM(s)/deciliter  glucagon  Injectable 1 milliGRAM(s) IntraMuscular once PRN Glucose LESS THAN 70 milligrams/deciliter  oxycodone    5 mG/acetaminophen 325 mG 1 Tablet(s) Oral every 4 hours PRN Moderate Pain (4 - 6)      Allergies    penicillin (Other)    Intolerances        PAST MEDICAL & SURGICAL HISTORY:  Ascites    Cirrhosis    DM (diabetes mellitus)    CHF (congestive heart failure)    HTN (hypertension)    S/P hernia repair                Vital Signs Last 24 Hrs  T(C): 36.3 (14 Oct 2020 11:28), Max: 36.7 (14 Oct 2020 05:22)  T(F): 97.4 (14 Oct 2020 11:28), Max: 98 (14 Oct 2020 05:22)  HR: 77 (14 Oct 2020 11:28) (53 - 77)  BP: 153/74 (14 Oct 2020 11:28) (120/65 - 180/80)  BP(mean): --  RR: 18 (14 Oct 2020 05:22) (18 - 18)  SpO2: 95% (14 Oct 2020 11:28) (95% - 98%)    PHYSICAL EXAMINATION:    GENERAL: The patient is awake and alert in no apparent distress. Frail.    HEENT: Head is normocephalic and atraumatic.  Mucous membranes are moist.    NECK: Supple.    LUNGS: chest tube on R; decreased bs on r,  respirations unlabored    HEART: Regular rate and rhythm      ABDOMEN: Soft, nontender, and nondistended.      EXTREMITIES: Without any cyanosis, clubbing, rash, lesions or edema.    NEUROLOGIC: Grossly intact.            RADIOLOGY & ADDITIONAL STUDIES:  CXR today: PTX on R; smaller; official read pending        < from: Xray Chest 1 View- PORTABLE-Routine (Xray Chest 1 View- PORTABLE-Routine in AM.) (10.13.20 @ 14:40) >     EXAM:  XR CHEST PORTABLE ROUTINE 1V                          PROCEDURE DATE:  10/13/2020          INTERPRETATION:  Portable chest radiograph    CLINICAL INFORMATION:   RIGHT chest tube. Follow-up.    TECHNIQUE:  Portable  AP view of the chest was obtained.    COMPARISON: 10/12/2020 chest available for review.    FINDINGS:  RIGHT lung reexpanded. RIGHT multi-sidehole pigtail catheter overlies RIGHT lower hemithorax.  . No airspace consolidation or effusion.. No pneumothorax.    The heart and mediastinum are within normal limits.    Visualized osseous structures are intact.        IMPRESSION:   No evidence of active chest disease.                  GERALD GRIFFIN M.D., ATTENDING RADIOLOGIST  This document has been electronically signed. Oct13 2020  4:17PM    < end of copied text >

## 2020-10-14 NOTE — STUDENT SIGN OFF DOCUMENT - DOCUMENTS STUDENTS ARE SIGNED OFF ON
Primary RN made aware of VS, VS cosign for Nursing student [facility] Nikki Miranda. Cosign: Marlyn Cuellar [employer] [facility] Nursing instructor./Vital Signs

## 2020-10-14 NOTE — PROGRESS NOTE ADULT - ASSESSMENT
-Large TRANSUDATIVE R Pleural effusion with hx cirrhosis and ascites; likely from liver dz; perhaps contributed to by CHF and renal insufficiency,  -PTX on R; improving but still there; no mass lesion on ct; likely ? pleural scarring  -Hx cirrhosis  -Renal insufficiency  -SOB  -Hypoxia; see above    RECC:  Prognosis poor. Agree with palliative care with hospice. Concerned the lung may not stay re-expanded even with chest tube; can either let fluid reaccumulate and treat palliatively for sob or do pleurex catheter and d/c. T-surg f/u.  O2 to keep sat >89%.  DVT proph. Cardio f/u. GI f/u.

## 2020-10-14 NOTE — PROGRESS NOTE ADULT - SUBJECTIVE AND OBJECTIVE BOX
Subjective: no c/o incisional pain at this time. Denies CP, SOB, palpitations, N/V, other c/o.    T(C): 36.3 (10-14-20 @ 11:28), Max: 36.7 (10-14-20 @ 05:22)  HR: 77 (10-14-20 @ 11:28) (53 - 77)  BP: 153/74 (10-14-20 @ 11:28) (120/65 - 180/80)  ABP: --  ABP(mean): --  RR: 18 (10-14-20 @ 05:22) (18 - 18)  SpO2: 95% (10-14-20 @ 11:28) (95% - 98%)  Wt(kg): --  CVP(mm Hg): --  CO: --  CI: --  PA: --       I&O's Detail    13 Oct 2020 07:01  -  14 Oct 2020 07:00  --------------------------------------------------------  IN:    IV PiggyBack: 100 mL    Oral Fluid: 600 mL  Total IN: 700 mL    OUT:    Chest Tube (mL): 750 mL    Voided (mL): 800 mL  Total OUT: 1550 mL    Total NET: -850 mL      14 Oct 2020 07:01  -  14 Oct 2020 14:03  --------------------------------------------------------  IN:    Oral Fluid: 340 mL  Total IN: 340 mL    OUT:  Total OUT: 0 mL    Total NET: 340 mL          LABS: All Lab data reviewed and analyzed                        12.3   6.98  )-----------( 144      ( 13 Oct 2020 06:51 )             37.5     10-13    136  |  99  |  77.0<H>  ----------------------------<  196<H>  4.5   |  24.0  |  1.94<H>    Ca    9.3      13 Oct 2020 06:51    TPro  6.5<L>  /  Alb  3.6  /  TBili  0.7  /  DBili  x   /  AST  27  /  ALT  13  /  AlkPhos  123<H>  10-13    PT/INR - ( 13 Oct 2020 06:51 )   PT: 11.6 sec;   INR: 1.00 ratio                 CAPILLARY BLOOD GLUCOSE      POCT Blood Glucose.: 243 mg/dL (14 Oct 2020 12:24)  POCT Blood Glucose.: 271 mg/dL (14 Oct 2020 10:10)  POCT Blood Glucose.: 191 mg/dL (14 Oct 2020 08:34)  POCT Blood Glucose.: 157 mg/dL (13 Oct 2020 20:52)  POCT Blood Glucose.: 210 mg/dL (13 Oct 2020 17:18)           RADIOLOGY: - Reviewed and analyzed   CXR: pending    HOSPITAL MEDICATIONS: All medications reviewed and analyzed  MEDICATIONS  (STANDING):  amLODIPine   Tablet 10 milliGRAM(s) Oral daily  ATENolol  Tablet 50 milliGRAM(s) Oral two times a day  cloNIDine 0.2 milliGRAM(s) Oral two times a day  dextrose 5%. 1000 milliLiter(s) (50 mL/Hr) IV Continuous <Continuous>  dextrose 50% Injectable 12.5 Gram(s) IV Push once  dextrose 50% Injectable 25 Gram(s) IV Push once  dextrose 50% Injectable 25 Gram(s) IV Push once  furosemide    Tablet 20 milliGRAM(s) Oral daily  heparin   Injectable 5000 Unit(s) SubCutaneous every 12 hours  influenza   Vaccine 0.5 milliLiter(s) IntraMuscular once  insulin glargine Injectable (LANTUS) 20 Unit(s) SubCutaneous at bedtime  insulin lispro (HumaLOG) corrective regimen sliding scale   SubCutaneous three times a day before meals  lidocaine   Patch 1 Patch Transdermal daily  losartan 25 milliGRAM(s) Oral daily  predniSONE   Tablet 2.5 milliGRAM(s) Oral daily  spironolactone 25 milliGRAM(s) Oral daily    MEDICATIONS  (PRN):  acetaminophen   Tablet .. 650 milliGRAM(s) Oral every 6 hours PRN Temp greater or equal to 38C (100.4F), Mild Pain (1 - 3)  dextrose 40% Gel 15 Gram(s) Oral once PRN Blood Glucose LESS THAN 70 milliGRAM(s)/deciliter  glucagon  Injectable 1 milliGRAM(s) IntraMuscular once PRN Glucose LESS THAN 70 milligrams/deciliter  oxycodone    5 mG/acetaminophen 325 mG 1 Tablet(s) Oral every 4 hours PRN Moderate Pain (4 - 6)          Neuro: A+O x 3, non-focal, speech clear and intact  HEENT: PERRL, EOMI, oral mucosa pink and moist  Neck: supple, no JVD  CV: regular rate, regular rhythm, +S1S2, no murmurs or rub  Pulm/chest: lung sounds CTA and equal bilaterally, no accessory muscle use noted, right chest tube dressing c/d/i   Abd: soft, NT, ND, +BS  Ext: BLANCAS x 4, no C/C/E  Skin: warm, well perfused       Case including assessment/plan of care discussed with   CT surgery attending.  Critical Care time:  35    minutes of noncontinuos care time spent evaluating/treating, reviewing imaging, labs, discussing case with multidisciplinary team, discussing plans/goals of care with patient/family.     85yFemale with PMH         PAST MEDICAL & SURGICAL HISTORY:  Ascites    Cirrhosis    DM (diabetes mellitus)    CHF (congestive heart failure)    HTN (hypertension)    S/P hernia repair

## 2020-10-14 NOTE — PROGRESS NOTE ADULT - SUBJECTIVE AND OBJECTIVE BOX
Chief Complaint: This is a 85y old woman patient being seen in follow-up consultation for cirrhosis.    HPI / 24H events:  Patient reports being tired of all the medical care and wishes to focus on QOL.  Is ready for discharge with hospice.    ROS: A 14-point review of systems was reviewed and was otherwise negative save what was reported in the HPI.    PAST MEDICAL/SURGICAL HISTORY:  Ascites  Cirrhosis  DM (diabetes mellitus)  CHF (congestive heart failure)  HTN (hypertension)  S/P hernia repair    MEDICATIONS  (STANDING):  amLODIPine   Tablet 10 milliGRAM(s) Oral daily  ATENolol  Tablet 50 milliGRAM(s) Oral two times a day  cloNIDine 0.2 milliGRAM(s) Oral two times a day  dextrose 5%. 1000 milliLiter(s) (50 mL/Hr) IV Continuous <Continuous>  dextrose 50% Injectable 12.5 Gram(s) IV Push once  dextrose 50% Injectable 25 Gram(s) IV Push once  dextrose 50% Injectable 25 Gram(s) IV Push once  furosemide    Tablet 20 milliGRAM(s) Oral daily  heparin   Injectable 5000 Unit(s) SubCutaneous every 12 hours  influenza   Vaccine 0.5 milliLiter(s) IntraMuscular once  insulin glargine Injectable (LANTUS) 20 Unit(s) SubCutaneous at bedtime  insulin lispro (HumaLOG) corrective regimen sliding scale   SubCutaneous three times a day before meals  lidocaine   Patch 1 Patch Transdermal daily  losartan 25 milliGRAM(s) Oral daily  predniSONE   Tablet 2.5 milliGRAM(s) Oral daily  spironolactone 25 milliGRAM(s) Oral daily    MEDICATIONS  (PRN):  acetaminophen   Tablet .. 650 milliGRAM(s) Oral every 6 hours PRN Temp greater or equal to 38C (100.4F), Mild Pain (1 - 3)  dextrose 40% Gel 15 Gram(s) Oral once PRN Blood Glucose LESS THAN 70 milliGRAM(s)/deciliter  glucagon  Injectable 1 milliGRAM(s) IntraMuscular once PRN Glucose LESS THAN 70 milligrams/deciliter  oxycodone    5 mG/acetaminophen 325 mG 1 Tablet(s) Oral every 4 hours PRN Moderate Pain (4 - 6)    penicillin (Other)    T(C): 36.3 (10-14-20 @ 11:28), Max: 36.7 (10-14-20 @ 05:22)  HR: 77 (10-14-20 @ 11:28) (53 - 77)  BP: 153/74 (10-14-20 @ 11:28) (120/65 - 180/80)  RR: 18 (10-14-20 @ 05:22) (18 - 18)  SpO2: 95% (10-14-20 @ 11:28) (95% - 98%)    I&O's Summary    13 Oct 2020 07:01  -  14 Oct 2020 07:00  --------------------------------------------------------  IN: 700 mL / OUT: 1550 mL / NET: -850 mL      PHYSICAL EXAM:  Constitutional: Chronically ill, elderly  woman in no apparent distress  Eyes: Sclerae anicteric, conjunctivae normal  ENMT: Mucus membranes moist, no oropharyngeal thrush noted  Neck: No thyroid nodules appreciated, no significant cervical or supraclavicular lymphadenopathy  Respiratory: Breathing nonlabored  Cardiovascular: Regular rate and rhythm  Gastrointestinal: Soft, nontender, nondistended, normoactive bowel sounds; no hepatosplenomegaly appreciated; no rebound tenderness or involuntary guarding  Extremities: No clubbing, cyanosis or edema  Neurological: Alert and oriented to person, place and time; no asterixis  Skin: No jaundice  Lymph Nodes: No significant lymphadenopathy  Musculoskeletal: No significant peripheral atrophy  Psychiatric: Affect and mood appropriate                   12.3   6.98  )-----------( 144      ( 10-13 @ 06:51 )             37.5                10.4   6.10  )-----------( 116      ( 10-12 @ 06:32 )             31.6       10-13    136  |  99  |  77.0<H>  ----------------------------<  196<H>  4.5   |  24.0  |  1.94<H>    Ca    9.3      13 Oct 2020 06:51    TPro  6.5<L>  /  Alb  3.6  /  TBili  0.7  /  DBili  x   /  AST  27  /  ALT  13  /  AlkPhos  123<H>  10-13    LIVER FUNCTIONS - ( 13 Oct 2020 06:51 )  Alb: 3.6 g/dL / Pro: 6.5 g/dL / ALK PHOS: 123 U/L / ALT: 13 U/L / AST: 27 U/L / GGT: x               PT/INR - ( 13 Oct 2020 06:51 )   PT: 11.6 sec;   INR: 1.00 ratio             IMAGING: I personally reviewed the [XXXXXX], and I agree with the radiologist's interpretation as described below:

## 2020-10-14 NOTE — PROGRESS NOTE ADULT - ASSESSMENT
Patient with advanced decompensated CHF and cirrhosis, complicated by right pleural effusion, status post chest tube.  Patient is now willing to be discharged with home hospice.  Would continue diuretics as keeping the fluid under control would provide comfort. Patient with advanced decompensated CHF and cirrhosis, complicated by right pleural effusion, status post chest tube.  Patient is now willing to be discharged with home hospice.  Would continue diuretics as keeping the fluid under control would provide comfort.    GI will sign off.  Please reconsult as needed and call with any questions or concerns.

## 2020-10-14 NOTE — DIETITIAN INITIAL EVALUATION ADULT. - ETIOLOGY
related to inability to consume sufficient protein energy intake with poor appetite in setting of advanced age, hx cirrhosis and now s/p fall with SOB/pleural effusion

## 2020-10-14 NOTE — DIETITIAN INITIAL EVALUATION ADULT. - MALNUTRITION
Limited NFPE performed- severe muscle wasting at temples, clavicle, shoulder, interosseous.  Severe fat loss in orbital region, buccal pads. severe (chronic)

## 2020-10-14 NOTE — PROGRESS NOTE ADULT - NSHPATTENDINGPLANDISCUSS_GEN_ALL_CORE
Dr. Burr
the patient, Dr. Busch, and Dr. Krishnamurthy
the patient, her son Abdulaziz, Dr. Busch, and Dr. Burr
the pt.

## 2020-10-15 LAB
ANION GAP SERPL CALC-SCNC: 13 MMOL/L — SIGNIFICANT CHANGE UP (ref 5–17)
BUN SERPL-MCNC: 82 MG/DL — HIGH (ref 8–20)
CALCIUM SERPL-MCNC: 9.2 MG/DL — SIGNIFICANT CHANGE UP (ref 8.6–10.2)
CHLORIDE SERPL-SCNC: 103 MMOL/L — SIGNIFICANT CHANGE UP (ref 98–107)
CO2 SERPL-SCNC: 24 MMOL/L — SIGNIFICANT CHANGE UP (ref 22–29)
CREAT SERPL-MCNC: 2.36 MG/DL — HIGH (ref 0.5–1.3)
GLUCOSE BLDC GLUCOMTR-MCNC: 105 MG/DL — HIGH (ref 70–99)
GLUCOSE BLDC GLUCOMTR-MCNC: 147 MG/DL — HIGH (ref 70–99)
GLUCOSE BLDC GLUCOMTR-MCNC: 157 MG/DL — HIGH (ref 70–99)
GLUCOSE BLDC GLUCOMTR-MCNC: 80 MG/DL — SIGNIFICANT CHANGE UP (ref 70–99)
GLUCOSE SERPL-MCNC: 108 MG/DL — HIGH (ref 70–99)
HCT VFR BLD CALC: 33.1 % — LOW (ref 34.5–45)
HGB BLD-MCNC: 10.7 G/DL — LOW (ref 11.5–15.5)
MCHC RBC-ENTMCNC: 31 PG — SIGNIFICANT CHANGE UP (ref 27–34)
MCHC RBC-ENTMCNC: 32.3 GM/DL — SIGNIFICANT CHANGE UP (ref 32–36)
MCV RBC AUTO: 95.9 FL — SIGNIFICANT CHANGE UP (ref 80–100)
PLATELET # BLD AUTO: 121 K/UL — LOW (ref 150–400)
POTASSIUM SERPL-MCNC: 5.6 MMOL/L — HIGH (ref 3.5–5.3)
POTASSIUM SERPL-SCNC: 5.6 MMOL/L — HIGH (ref 3.5–5.3)
PROCALCITONIN SERPL-MCNC: 0.31 NG/ML — HIGH (ref 0.02–0.1)
RBC # BLD: 3.45 M/UL — LOW (ref 3.8–5.2)
RBC # FLD: 14.7 % — HIGH (ref 10.3–14.5)
SODIUM SERPL-SCNC: 140 MMOL/L — SIGNIFICANT CHANGE UP (ref 135–145)
TSH SERPL-MCNC: 2.96 UIU/ML — SIGNIFICANT CHANGE UP (ref 0.27–4.2)
WBC # BLD: 8.16 K/UL — SIGNIFICANT CHANGE UP (ref 3.8–10.5)
WBC # FLD AUTO: 8.16 K/UL — SIGNIFICANT CHANGE UP (ref 3.8–10.5)

## 2020-10-15 PROCEDURE — 99232 SBSQ HOSP IP/OBS MODERATE 35: CPT

## 2020-10-15 PROCEDURE — 71045 X-RAY EXAM CHEST 1 VIEW: CPT | Mod: 26

## 2020-10-15 RX ORDER — HYDRALAZINE HCL 50 MG
5 TABLET ORAL ONCE
Refills: 0 | Status: COMPLETED | OUTPATIENT
Start: 2020-10-15 | End: 2020-10-15

## 2020-10-15 RX ORDER — SODIUM ZIRCONIUM CYCLOSILICATE 10 G/10G
10 POWDER, FOR SUSPENSION ORAL DAILY
Refills: 0 | Status: DISCONTINUED | OUTPATIENT
Start: 2020-10-15 | End: 2020-10-18

## 2020-10-15 RX ORDER — INSULIN GLARGINE 100 [IU]/ML
16 INJECTION, SOLUTION SUBCUTANEOUS AT BEDTIME
Refills: 0 | Status: DISCONTINUED | OUTPATIENT
Start: 2020-10-15 | End: 2020-10-20

## 2020-10-15 RX ADMIN — INSULIN GLARGINE 16 UNIT(S): 100 INJECTION, SOLUTION SUBCUTANEOUS at 22:03

## 2020-10-15 RX ADMIN — SODIUM ZIRCONIUM CYCLOSILICATE 10 GRAM(S): 10 POWDER, FOR SUSPENSION ORAL at 12:20

## 2020-10-15 RX ADMIN — Medication 0.2 MILLIGRAM(S): at 05:30

## 2020-10-15 RX ADMIN — Medication 2.5 MILLIGRAM(S): at 05:30

## 2020-10-15 RX ADMIN — SPIRONOLACTONE 25 MILLIGRAM(S): 25 TABLET, FILM COATED ORAL at 05:30

## 2020-10-15 RX ADMIN — Medication 20 MILLIGRAM(S): at 05:30

## 2020-10-15 RX ADMIN — Medication 0.2 MILLIGRAM(S): at 17:37

## 2020-10-15 RX ADMIN — LIDOCAINE 1 PATCH: 4 CREAM TOPICAL at 22:03

## 2020-10-15 RX ADMIN — Medication 3 UNIT(S): at 09:31

## 2020-10-15 RX ADMIN — HEPARIN SODIUM 5000 UNIT(S): 5000 INJECTION INTRAVENOUS; SUBCUTANEOUS at 22:03

## 2020-10-15 RX ADMIN — Medication 1: at 17:37

## 2020-10-15 RX ADMIN — HEPARIN SODIUM 5000 UNIT(S): 5000 INJECTION INTRAVENOUS; SUBCUTANEOUS at 09:31

## 2020-10-15 RX ADMIN — Medication 5 MILLIGRAM(S): at 23:02

## 2020-10-15 NOTE — PROGRESS NOTE ADULT - ASSESSMENT
85 year old F with PMH of HTN, CHF, cirrhosis with ascites, HLD, DM, AFib (no A/C) presented to ED s/p fall, reported as mechanical fall, tripped and fell, denies loss of consciousness or head injury. Patient stated she had worsening shortness of breath for the past 3 days.  Acute hypoxic respiratory failure due to right pleural effusion, likely hepatic hydrothorax, improved. trapped lung  -CXR showed large right side pleural effusion  - S/p chest tube, cytology negative for malignancy.  - CTS, Pulm consulted. Recs appreciated  - Supplemental oxygen via NC prn  - Daily CXR  - CT care per CTS. Possible removal vs Pleurex cath.  Volume overload 2/2 to cirrhosis, improved  - Cardiology consulted, recs appreciated. Cardiology signed off.   - GI consulted, recs apprecitated  - Strict I&Os, daily weights, fluid/salt restriction.  - Lasix IV switched to PO.  - C/w Spirinolactone 25mg daily  - Telemetry monitoring  - S/p albumin, as per GI no need for paracentesis at this time due to small amt of ascites.     DEVYN vs CKD, worsened  - Nephro consulted, recs appreciated  - Monitor BMP, correct electrolytes as needed.    HTN  - C/w with Lasix 20mg PO, spironolactone 25mg and Clonidine 0.2mg BID. Losartan 25mg daily started by Nephrology.  - D/c Norvasc     IDDM  - Decrease Lantus to 16u qhs  - D/c Lispro 3u TIDAC with meals  - ISS, hypoglycemic protocol, FSG     Arthritis   -cont with prednisone     Severe protein calorie malnutrition  - Supplementation  DVT ppx   - Heparin sq  Dispo: Home with home hospice once CT removed w/ palliative management of sx vs Pleurex placement per CTS. Patient was asked today regarding her preference, initially she wanted the CT out then considered a Pleurex. Awaiting discussions with CTS and pt and final plan regarding pleural effusion mgmt.   Code Status: DNR/DNI   85 year old F with PMH of HTN, CHF, cirrhosis with ascites, HLD, DM, AFib (no A/C) presented to ED s/p fall, reported as mechanical fall, tripped and fell, denies loss of consciousness or head injury. Patient stated she had worsening shortness of breath for the past 3 days.  Acute hypoxic respiratory failure due to right pleural effusion, likely hepatic hydrothorax, improved. trapped lung  -CXR showed large right side pleural effusion  - S/p chest tube, cytology negative for malignancy.  - CTS, Pulm consulted. Recs appreciated  - Supplemental oxygen via NC prn, currently on room air   - Daily CXR  - CT care per CTS. Possible removal vs Pleurex cath.    Volume overload 2/2 to cirrhosis, improved  - Cardiology consulted, recs appreciated. Cardiology signed off.   - GI consulted, recs appreciated.   - Strict I&Os, daily weights, fluid/salt restriction.  - Lasix 20mg PO daily  - C/w Spirinolactone 25mg daily  - Telemetry monitoring  - S/p albumin, as per GI no need for paracentesis at this time due to small amt of ascites.     DEVYN vs CKD, worsened. hyperkalemia  - Nephro following.  - Monitor BMP, correct electrolytes as needed.    HTN  - C/w with Lasix 20mg PO, spironolactone 25mg and Clonidine 0.2mg BID  - D/c Norvasc and Losartan.      IDDM  - Decrease Lantus to 16u qhs  - D/c Lispro 3u TIDAC with meals  - ISS, hypoglycemic protocol, FSG     Arthritis   -cont with prednisone     Severe protein calorie malnutrition  - Supplementation  DVT ppx   - Heparin sq    Advance Directives/Dispo: Pt desires home w/ hospice. She is still deciding about going home with CT vs Pleurex placement. CTS to follow up. Pt daughter desires patient to be medical optimized and able to ambulate around home before being discharged home with home hospice. She would like the patient to go to [facility] and then to home hospice once cleared from [facility], however pt refusing [facility].   Code Status: DNR/DNI

## 2020-10-15 NOTE — PROGRESS NOTE ADULT - ASSESSMENT
-Large TRANSUDATIVE R Pleural effusion with hx cirrhosis and ascites; likely from liver dz; perhaps contributed to by CHF and renal insufficiency,  -PTX on R; improving;  no mass lesion on ct; likely ? pleural scarring  -Hx cirrhosis  -Renal insufficiency  -SOB  -Hypoxia; see above    RECC:  Prognosis poor. Agree with palliative care with hospice. If family agrees, would lean towards d/c tube since she is so bothered by it and if/when fluid reacumulates treat palliatively for sob. T-surg f/u.  O2 to keep sat >89%.  DVT proph. Cardio f/u. GI f/u.

## 2020-10-15 NOTE — PROGRESS NOTE ADULT - SUBJECTIVE AND OBJECTIVE BOX
Subjective: "I want to get rid of this tube" Denies CP, SOB.     T(C): 36.4 (10-15-20 @ 15:50), Max: 36.7 (10-15-20 @ 09:30)  HR: 56 (10-15-20 @ 17:40) (55 - 60)  BP: 132/78 (10-15-20 @ 17:40) (96/55 - 177/80)  RR: 22 (10-15-20 @ 12:23) (18 - 22)  SpO2: 98% (10-15-20 @ 16:11) (95% - 100%)    I&O's Detail    14 Oct 2020 07:01  -  15 Oct 2020 07:00  --------------------------------------------------------  IN:    Oral Fluid: 880 mL  Total IN: 880 mL    OUT:    Chest Tube (mL): 540 mL  Total OUT: 540 mL    Total NET: 340 mL      15 Oct 2020 07:01  -  15 Oct 2020 18:53  --------------------------------------------------------  IN:    Oral Fluid: 660 mL  Total IN: 660 mL    OUT:    Chest Tube (mL): 170 mL    Voided (mL): 600 mL  Total OUT: 770 mL    Total NET: -110 mL          Patient's  laboratory results and current medications reviewed.    Physical Exam:  Gen: WN/WD NAD  Neuro: AAOx3, nonfocal  Pulm: diminished right chest with crackles  CV: RRR  Tube: continued serous drainage without air leak    Today's CXR:   < from: Xray Chest 1 View- PORTABLE-Routine (Xray Chest 1 View- PORTABLE-Routine in AM.) (10.15.20 @ 06:13) >    IMPRESSION: No interval change.    5% RIGHT apical pneumothorax.    < end of copied text >

## 2020-10-15 NOTE — PROGRESS NOTE ADULT - PROBLEM SELECTOR PLAN 1
maintain pigtail right  likely not a candidate for pleurex for h/o cirrhosis  CHF mild  Will plan to remove tube on discharge  Home hospice is being considered by pt and her daughter

## 2020-10-15 NOTE — GOALS OF CARE CONVERSATION - ADVANCED CARE PLANNING - CONVERSATION DETAILS
SW met with patient and daughter Pippa to address palliative care involvement and offer support. patient verbalized understanding her time is limited but wants to discuss details with family. SW addressed completing HCP and patient designated dgt Pippa as primary and son Abdulaziz and alternate agent. Palliative care following.
Sw met with patient to provide ongoing support. Patient signed HCP . SW spoke with daughter earlier today and provided support. daughter reports that she and patient spoke last night and daughter wants to ensure patient quality of life and is open to home Hospice.
spoke with patient regarding goal of care, at this time pt want everything to be done, she want trial of intubation and cpr if needed. pt is full code.
Home hospice referral received. [facility] RN spoke with patient's daughter/HCP, Pippa, regarding home hospice services. All aspects of hospice care explained with good return understanding. All questions answered; emotional support provided. As per daughter Pippa, she wants home hospice services  - she is just waiting to speak with PT regarding patient's functional status to determine if patient should go to [facility] and then hospice versus just home with hospice. This writer and Pippa agreed to speak again either later today or early tomorrow to solidify plan; direct cell phone number provided to Pippa. Will continue to follow.    Kerry Pereira RN [facility]  [phone]
Writer[facility] RN followed up with patient's daughter, Pippa, today regarding d/c planning. As per daughter Pippa, she is still not sure if she wants patient to be d/c'd to a [facility] with eventual transition to home hospice versus d/c directly home from [facility] on hospice. Daughter stated that she is waiting to speak with PT and with Dr. Busch. Daughter Pippa has this writer's cell phone number and advised she would call as soon as she had made her decision. Will continue to follow.     Kerry Pereira RN [facility]  [phone]
VICTORIA discussed.  Discussed CPR/ intubation/ mech  intubation- limited benefit given comorbidities and advance  age.   Patient verbalized does NOT want CPR nor any intubation for any reason.   Patient aware of home hospice plan. Daughter hoping she can have a little PT, and asked about rehab. Patient stated that she does NOT want to go. She refused today's PT  session.

## 2020-10-15 NOTE — PROGRESS NOTE ADULT - SUBJECTIVE AND OBJECTIVE BOX
PULMONARY PROGRESS NOTE      NEO BLANDON-[id number]    Patient is a 85y old  Female who presents with a chief complaint of right Pleural effusion (15 Oct 2020 15:22)      INTERVAL HPI/OVERNIGHT EVENTS: She still has pain at pigtail site. Denies any sob, wheeze. On RA. Wants to go home.     MEDICATIONS  (STANDING):  ATENolol  Tablet 50 milliGRAM(s) Oral two times a day  cloNIDine 0.2 milliGRAM(s) Oral two times a day  dextrose 5%. 1000 milliLiter(s) (50 mL/Hr) IV Continuous <Continuous>  dextrose 50% Injectable 12.5 Gram(s) IV Push once  dextrose 50% Injectable 25 Gram(s) IV Push once  dextrose 50% Injectable 25 Gram(s) IV Push once  furosemide    Tablet 20 milliGRAM(s) Oral daily  heparin   Injectable 5000 Unit(s) SubCutaneous every 12 hours  influenza   Vaccine 0.5 milliLiter(s) IntraMuscular once  insulin glargine Injectable (LANTUS) 16 Unit(s) SubCutaneous at bedtime  insulin lispro (HumaLOG) corrective regimen sliding scale   SubCutaneous three times a day before meals  lidocaine   Patch 1 Patch Transdermal daily  predniSONE   Tablet 2.5 milliGRAM(s) Oral daily  sodium zirconium cyclosilicate 10 Gram(s) Oral daily  spironolactone 25 milliGRAM(s) Oral daily      MEDICATIONS  (PRN):  acetaminophen   Tablet .. 650 milliGRAM(s) Oral every 6 hours PRN Temp greater or equal to 38C (100.4F), Mild Pain (1 - 3)  dextrose 40% Gel 15 Gram(s) Oral once PRN Blood Glucose LESS THAN 70 milliGRAM(s)/deciliter  glucagon  Injectable 1 milliGRAM(s) IntraMuscular once PRN Glucose LESS THAN 70 milligrams/deciliter  oxycodone    5 mG/acetaminophen 325 mG 1 Tablet(s) Oral every 4 hours PRN Moderate Pain (4 - 6)      Allergies    penicillin (Other)    Intolerances        PAST MEDICAL & SURGICAL HISTORY:  Ascites    Cirrhosis    DM (diabetes mellitus)    CHF (congestive heart failure)    HTN (hypertension)    S/P hernia repair           Vital Signs Last 24 Hrs  T(C): 36.7 (15 Oct 2020 09:30), Max: 36.7 (15 Oct 2020 09:30)  T(F): 98 (15 Oct 2020 09:30), Max: 98 (15 Oct 2020 09:30)  HR: 59 (15 Oct 2020 12:23) (55 - 60)  BP: 134/52 (15 Oct 2020 09:30) (96/55 - 165/70)  BP(mean): --  RR: 22 (15 Oct 2020 12:23) (18 - 22)  SpO2: 100% (15 Oct 2020 12:23) (95% - 100%)    PHYSICAL EXAMINATION:    GENERAL: The patient is awake and alert in no apparent distress.     HEENT:   Mucous membranes are moist.    NECK: Supple.    LUNGS: chest tube on R ored    HEART: Regular rate           EXTREMITIES: Without any cyanosis, clubbing, rash, lesions or edema.    NEUROLOGIC: Grossly intact.       < from: Xray Chest 1 View- PORTABLE-Routine (Xray Chest 1 View- PORTABLE-Routine in AM.) (10.15.20 @ 06:13) >   EXAM:  XR CHEST PORTABLE ROUTINE 1V                          PROCEDURE DATE:  10/15/2020          INTERPRETATION:  Portable chest radiograph    CLINICAL INFORMATION:   Short of breath.    TECHNIQUE:  Portable  AP view of the chest was obtained.    COMPARISON: No previous examinations are available for review.    FINDINGS: There is no interval change.  RIGHT multi-sidehole pigtail catheter overlies RIGHT lower hemithorax.    5% RIGHT apical pneumothorax.  /./.  The lungs are clear of airspace consolidations or effusions.  The heart and mediastinum are within normal limits.    Visualized osseous structures are intact.        IMPRESSION: No interval change.    5% RIGHT apical pneumothorax.                    GERALD GRIFFIN M.D., ATTENDING RADIOLOGIST    < end of copied text >

## 2020-10-15 NOTE — PROGRESS NOTE ADULT - ASSESSMENT
85 female with R effusion with multiple possible etiologies including CHF, cirrhosis, malnutrition, ? underlying malignancy.  Pneumothorax on CXR likely trapped lung.  Daily CXR.  CT Chest as per pulmonology.  10/12 Maintain R pigtail water-seal  10/14 pt still with high-output from chest tube.

## 2020-10-15 NOTE — PROGRESS NOTE ADULT - SUBJECTIVE AND OBJECTIVE BOX
[facility] Division of Hospital Medicine  Harlan Busch [phone]    Chief Complaint:  Patient is a 85y old  Female who presents with a chief complaint of right Pleural effusion (10 Oct 2020 13:17)      SUBJECTIVE / OVERNIGHT EVENTS:  Patient seen and examined at bedside. No acute events reported overnight. Patient reports feeling better. CT continues to drain. No new complaints.    MEDICATIONS  (STANDING):  ATENolol  Tablet 50 milliGRAM(s) Oral two times a day  cloNIDine 0.2 milliGRAM(s) Oral two times a day  dextrose 5%. 1000 milliLiter(s) (50 mL/Hr) IV Continuous <Continuous>  dextrose 50% Injectable 12.5 Gram(s) IV Push once  dextrose 50% Injectable 25 Gram(s) IV Push once  dextrose 50% Injectable 25 Gram(s) IV Push once  furosemide   Injectable 40 milliGRAM(s) IV Push two times a day  heparin   Injectable 5000 Unit(s) SubCutaneous every 12 hours  insulin glargine Injectable (LANTUS) 10 Unit(s) SubCutaneous at bedtime  insulin lispro (HumaLOG) corrective regimen sliding scale   SubCutaneous three times a day before meals  lidocaine   Patch 1 Patch Transdermal daily  predniSONE   Tablet 2.5 milliGRAM(s) Oral daily  spironolactone 25 milliGRAM(s) Oral daily    MEDICATIONS  (PRN):  acetaminophen   Tablet .. 650 milliGRAM(s) Oral every 6 hours PRN Temp greater or equal to 38C (100.4F), Mild Pain (1 - 3)  dextrose 40% Gel 15 Gram(s) Oral once PRN Blood Glucose LESS THAN 70 milliGRAM(s)/deciliter  glucagon  Injectable 1 milliGRAM(s) IntraMuscular once PRN Glucose LESS THAN 70 milligrams/deciliter  oxycodone    5 mG/acetaminophen 325 mG 1 Tablet(s) Oral every 4 hours PRN Moderate Pain (4 - 6)        I&O's Summary    09 Oct 2020 07:  -  10 Oct 2020 07:00  --------------------------------------------------------  IN: 0 mL / OUT: 5440 mL / NET: -5440 mL    10 Oct 2020 07:01  -  10 Oct 2020 14:09  --------------------------------------------------------  IN: 0 mL / OUT: 500 mL / NET: -500 mL        PHYSICAL EXAM:  Vital Signs Last 24 Hrs  T(C): 36.6 (10 Oct 2020 11:30), Max: 36.6 (10 Oct 2020 11:30)  T(F): 97.9 (10 Oct 2020 11:30), Max: 97.9 (10 Oct 2020 11:30)  HR: 60 (10 Oct 2020 11:30) (56 - 76)  BP: 102/61 (10 Oct 2020 11:30) (102/61 - 184/68)  BP(mean): --  RR: 18 (10 Oct 2020 11:30) (18 - 19)  SpO2: 97% (10 Oct 2020 11:30) (94% - 99%)        CONSTITUTIONAL: Elderly female, frail, in NAD, pleasant.  HEENT: NC/AT, PERRL, no JVD  RESPIRATORY: Decreased breath sounds R lung  CARDIOVASCULAR: RRR, S1/S2+, no m/g/r  ABDOMEN: Mildly distended abdomen, non-tender, BS+  MUSCLOSKELETAL: No edema, cyanosis, deformities  PSYCH: A+O to person, place, and time; affect appropriate  NEUROLOGY: CN 2-12 are intact and symmetric; no gross neurological deficits.  SKIN: No rashes; no palpable lesions  VASC: Distal pulses palpable    LABS:                        12.0   8.91  )-----------( 135      ( 10 Oct 2020 03:17 )             37.1     10-10    137  |  101  |  61.0<H>  ----------------------------<  260<H>  5.1   |  25.0  |  1.99<H>    Ca    8.6      10 Oct 2020 03:17  Phos  4.0     10-10  Mg     1.9     10-10    TPro  6.8  /  Alb  2.8<L>  /  TBili  0.6  /  DBili  x   /  AST  35<H>  /  ALT  19  /  AlkPhos  163<H>  10-09    PT/INR - ( 09 Oct 2020 01:49 )   PT: 11.9 sec;   INR: 1.03 ratio         PTT - ( 09 Oct 2020 01:49 )  PTT:29.7 sec  CARDIAC MARKERS ( 09 Oct 2020 01:49 )  x     / 0.03 ng/mL / x     / x     / x          Urinalysis Basic - ( 09 Oct 2020 06:26 )    Color: Yellow / Appearance: Clear / S.010 / pH: x  Gluc: x / Ketone: Negative  / Bili: Negative / Urobili: Negative mg/dL   Blood: x / Protein: 100 mg/dL / Nitrite: Negative   Leuk Esterase: Negative / RBC: 11-25 /HPF / WBC 3-5   Sq Epi: x / Non Sq Epi: Occasional / Bacteria: Few        Culture - Body Fluid with Gram Stain (collected 09 Oct 2020 16:33)  Source: .Body Fluid Pleural Fluid  Gram Stain (09 Oct 2020 19:22):    No polymorphonuclear cells seen    No organisms seen    by cytocentrifuge  Preliminary Report (10 Oct 2020 12:47):    No growth      CAPILLARY BLOOD GLUCOSE      POCT Blood Glucose.: 273 mg/dL (10 Oct 2020 11:27)  POCT Blood Glucose.: 259 mg/dL (10 Oct 2020 08:12)  POCT Blood Glucose.: 221 mg/dL (09 Oct 2020 16:16)        RADIOLOGY & ADDITIONAL TESTS:  Results Reviewed:   Imaging Personally Reviewed:  Electrocardiogram Personally Reviewed:

## 2020-10-16 ENCOUNTER — TRANSCRIPTION ENCOUNTER (OUTPATIENT)
Age: 85
End: 2020-10-16

## 2020-10-16 LAB
ALBUMIN SERPL ELPH-MCNC: 3.5 G/DL — SIGNIFICANT CHANGE UP (ref 3.3–5.2)
ALP SERPL-CCNC: 142 U/L — HIGH (ref 40–120)
ALT FLD-CCNC: 15 U/L — SIGNIFICANT CHANGE UP
AMMONIA BLD-MCNC: 72 UMOL/L — HIGH (ref 11–55)
ANION GAP SERPL CALC-SCNC: 14 MMOL/L — SIGNIFICANT CHANGE UP (ref 5–17)
AST SERPL-CCNC: 33 U/L — HIGH
BILIRUB SERPL-MCNC: 0.7 MG/DL — SIGNIFICANT CHANGE UP (ref 0.4–2)
BUN SERPL-MCNC: 84 MG/DL — HIGH (ref 8–20)
CALCIUM SERPL-MCNC: 9.5 MG/DL — SIGNIFICANT CHANGE UP (ref 8.6–10.2)
CHLORIDE SERPL-SCNC: 100 MMOL/L — SIGNIFICANT CHANGE UP (ref 98–107)
CO2 SERPL-SCNC: 24 MMOL/L — SIGNIFICANT CHANGE UP (ref 22–29)
CREAT SERPL-MCNC: 2.13 MG/DL — HIGH (ref 0.5–1.3)
GLUCOSE BLDC GLUCOMTR-MCNC: 142 MG/DL — HIGH (ref 70–99)
GLUCOSE BLDC GLUCOMTR-MCNC: 183 MG/DL — HIGH (ref 70–99)
GLUCOSE BLDC GLUCOMTR-MCNC: 200 MG/DL — HIGH (ref 70–99)
GLUCOSE BLDC GLUCOMTR-MCNC: 241 MG/DL — HIGH (ref 70–99)
GLUCOSE SERPL-MCNC: 79 MG/DL — SIGNIFICANT CHANGE UP (ref 70–99)
HCT VFR BLD CALC: 37.4 % — SIGNIFICANT CHANGE UP (ref 34.5–45)
HGB BLD-MCNC: 12 G/DL — SIGNIFICANT CHANGE UP (ref 11.5–15.5)
MCHC RBC-ENTMCNC: 30.7 PG — SIGNIFICANT CHANGE UP (ref 27–34)
MCHC RBC-ENTMCNC: 32.1 GM/DL — SIGNIFICANT CHANGE UP (ref 32–36)
MCV RBC AUTO: 95.7 FL — SIGNIFICANT CHANGE UP (ref 80–100)
PLATELET # BLD AUTO: 159 K/UL — SIGNIFICANT CHANGE UP (ref 150–400)
POTASSIUM SERPL-MCNC: 5 MMOL/L — SIGNIFICANT CHANGE UP (ref 3.5–5.3)
POTASSIUM SERPL-SCNC: 5 MMOL/L — SIGNIFICANT CHANGE UP (ref 3.5–5.3)
PROT SERPL-MCNC: 6.3 G/DL — LOW (ref 6.6–8.7)
RBC # BLD: 3.91 M/UL — SIGNIFICANT CHANGE UP (ref 3.8–5.2)
RBC # FLD: 14.3 % — SIGNIFICANT CHANGE UP (ref 10.3–14.5)
SODIUM SERPL-SCNC: 137 MMOL/L — SIGNIFICANT CHANGE UP (ref 135–145)
WBC # BLD: 7.8 K/UL — SIGNIFICANT CHANGE UP (ref 3.8–10.5)
WBC # FLD AUTO: 7.8 K/UL — SIGNIFICANT CHANGE UP (ref 3.8–10.5)

## 2020-10-16 PROCEDURE — 99231 SBSQ HOSP IP/OBS SF/LOW 25: CPT

## 2020-10-16 PROCEDURE — 99233 SBSQ HOSP IP/OBS HIGH 50: CPT

## 2020-10-16 PROCEDURE — 71045 X-RAY EXAM CHEST 1 VIEW: CPT | Mod: 26

## 2020-10-16 PROCEDURE — 99232 SBSQ HOSP IP/OBS MODERATE 35: CPT

## 2020-10-16 RX ORDER — OXYCODONE AND ACETAMINOPHEN 5; 325 MG/1; MG/1
1 TABLET ORAL EVERY 4 HOURS
Refills: 0 | Status: DISCONTINUED | OUTPATIENT
Start: 2020-10-17 | End: 2020-10-20

## 2020-10-16 RX ORDER — LACTULOSE 10 G/15ML
15 SOLUTION ORAL
Refills: 0 | Status: DISCONTINUED | OUTPATIENT
Start: 2020-10-16 | End: 2020-10-20

## 2020-10-16 RX ADMIN — LIDOCAINE 1 PATCH: 4 CREAM TOPICAL at 09:51

## 2020-10-16 RX ADMIN — LACTULOSE 15 GRAM(S): 10 SOLUTION ORAL at 18:07

## 2020-10-16 RX ADMIN — ATENOLOL 50 MILLIGRAM(S): 25 TABLET ORAL at 18:02

## 2020-10-16 RX ADMIN — HEPARIN SODIUM 5000 UNIT(S): 5000 INJECTION INTRAVENOUS; SUBCUTANEOUS at 09:47

## 2020-10-16 RX ADMIN — HEPARIN SODIUM 5000 UNIT(S): 5000 INJECTION INTRAVENOUS; SUBCUTANEOUS at 22:11

## 2020-10-16 RX ADMIN — INSULIN GLARGINE 16 UNIT(S): 100 INJECTION, SOLUTION SUBCUTANEOUS at 22:11

## 2020-10-16 RX ADMIN — Medication 1: at 12:25

## 2020-10-16 RX ADMIN — Medication 0.2 MILLIGRAM(S): at 06:29

## 2020-10-16 RX ADMIN — Medication 2.5 MILLIGRAM(S): at 06:28

## 2020-10-16 RX ADMIN — Medication 20 MILLIGRAM(S): at 06:28

## 2020-10-16 RX ADMIN — LIDOCAINE 1 PATCH: 4 CREAM TOPICAL at 22:12

## 2020-10-16 RX ADMIN — Medication 0.2 MILLIGRAM(S): at 18:01

## 2020-10-16 RX ADMIN — LIDOCAINE 1 PATCH: 4 CREAM TOPICAL at 07:30

## 2020-10-16 RX ADMIN — Medication 2: at 17:59

## 2020-10-16 RX ADMIN — SPIRONOLACTONE 25 MILLIGRAM(S): 25 TABLET, FILM COATED ORAL at 06:28

## 2020-10-16 RX ADMIN — SODIUM ZIRCONIUM CYCLOSILICATE 10 GRAM(S): 10 POWDER, FOR SUSPENSION ORAL at 13:12

## 2020-10-16 NOTE — PROGRESS NOTE ADULT - ASSESSMENT
-Large TRANSUDATIVE R Pleural effusion with hx cirrhosis and ascites; likely from liver dz; perhaps contributed to by CHF and renal insufficiency; cytopath negative  -PTX on R, resolved;  no mass lesion on ct; likely ? pleural scarring  -Hx cirrhosis  -Renal insufficiency  -SOB, hypoxia    Recommendations:  Family agreeable for home with hospice  Fluid likely to re-accumulate, and so if local pain/irritation from presence of chest tube causes noticeable symptoms for her, consider D/C chest tube and palliatively treating SOB if/when fluid re-accumulates  PO Lasix  S/p albumin  Prognosis poor  Pulmonary team to sign off. Do not hesitate to re-consult should any further issues arise.    Abdulaziz Lucero M.D.  Pulmonary & Critical Care Medicine  [facility]  Pulmonary Medicine at [place]  [address]  [place] [address]  T: [phone]  F: [phone]

## 2020-10-16 NOTE — PROGRESS NOTE ADULT - ASSESSMENT
85 year old F with PMH of HTN, CHF, cirrhosis with ascites, HLD, DM, AFib (no A/C) presented to ED s/p fall, reported as mechanical fall, tripped and fell, denies loss of consciousness or head injury. Patient stated she had worsening shortness of breath for the past 3 days.  Acute hypoxic respiratory failure due to right pleural effusion, likely hepatic hydrothorax, improved. trapped lung  -CXR showed large right side pleural effusion  - S/p chest tube, cytology negative for malignancy.  - CTS, Pulm consulted. Recs appreciated  - Supplemental oxygen via NC prn, currently on room air   - Daily CXR  - CT care per CTS. Removal on discharge to home hospice.     Volume overload 2/2 to cirrhosis, improved  - Cardiology consulted, recs appreciated. Cardiology signed off.   - GI consulted, recs appreciated.   - Strict I&Os, daily weights, fluid/salt restriction.  - Lasix 20mg PO daily  - C/w Spirinolactone 25mg daily  - Telemetry monitoring  - S/p albumin, as per GI no need for paracentesis at this time due to small amt of ascites.     DEVYN vs CKD, worsened. hyperkalemia  - Nephro following.  - Monitor BMP, correct electrolytes as needed.    HTN  - C/w with Lasix 20mg PO, spironolactone 25mg and Clonidine 0.2mg BID  - Losartan/Norvasc discotinued     IDDM  - Lantus 16u qhs  - D/c Lispro 3u TIDAC with meals  - ISS, hypoglycemic protocol, FSG     Arthritis   -cont with prednisone   Severe protein calorie malnutrition  - Supplementation    DVT ppx   - Heparin sq    Advance Directives/Dispo: Palliative following. Patient made DNR/DNI. Family and patient understand poor long-term prognosis. Patient/family agreeable for home hospice. Initially daughter wanted patient be discharged to [facility] and then follow up with home hospice after discharge from [facility]. They are now agreeable to home hospice, however family states they need the weekend to have arrangements made.   Code Status: DNR/DNI   85 year old F with PMH of HTN, CHF, cirrhosis with ascites, HLD, DM, AFib (no A/C) presented to ED s/p fall, reported as mechanical fall, tripped and fell, denies loss of consciousness or head injury. Patient stated she had worsening shortness of breath for the past 3 days.  Acute hypoxic respiratory failure due to right pleural effusion, likely hepatic hydrothorax, improved. trapped lung  -CXR showed large right side pleural effusion  - S/p chest tube, cytology negative for malignancy.  - CTS, Pulm consulted. Recs appreciated  - Supplemental oxygen via NC prn, currently on room air   - Daily CXR  - CT care per CTS. Removal on discharge to home hospice.     Volume overload 2/2 to cirrhosis, improved. Asterixis  - Cardiology consulted, recs appreciated. Cardiology signed off.   - GI consulted, recs appreciated.   - Strict I&Os, daily weights, fluid/salt restriction.  - Lasix 20mg PO daily  - C/w Spirinolactone 25mg daily  - Telemetry monitoring  - S/p albumin, as per GI no need for paracentesis at this time due to small amt of ascites.   - Ammonia elevated - will start Lactulose and titrate to bowel movements.    DEVYN vs CKD, worsened. hyperkalemia  - Nephro following.  - Monitor BMP, correct electrolytes as needed.    HTN  - C/w with Lasix 20mg PO, spironolactone 25mg and Clonidine 0.2mg BID  - Losartan/Norvasc discotinued     IDDM  - Lantus 16u qhs  - D/c Lispro 3u TIDAC with meals  - ISS, hypoglycemic protocol, FSG     Arthritis   -cont with prednisone   Severe protein calorie malnutrition  - Supplementation    DVT ppx   - Heparin sq    Advance Directives/Dispo: Palliative following. Patient made DNR/DNI. Family and patient understand poor long-term prognosis. Patient/family agreeable for home hospice. Initially daughter wanted patient be discharged to [facility] and then follow up with home hospice after discharge from [facility]. They are now agreeable to home hospice, however family states they need the weekend to have arrangements made.   Code Status: DNR/DNI

## 2020-10-16 NOTE — PROGRESS NOTE ADULT - SUBJECTIVE AND OBJECTIVE BOX
NEPHROLOGY INTERVAL HPI/OVERNIGHT EVENTS:  pt comfortable when seen earlier  no acute distress noted    MEDICATIONS  (STANDING):  ATENolol  Tablet 50 milliGRAM(s) Oral two times a day  cloNIDine 0.2 milliGRAM(s) Oral two times a day  dextrose 5%. 1000 milliLiter(s) (50 mL/Hr) IV Continuous <Continuous>  dextrose 50% Injectable 12.5 Gram(s) IV Push once  dextrose 50% Injectable 25 Gram(s) IV Push once  dextrose 50% Injectable 25 Gram(s) IV Push once  furosemide    Tablet 20 milliGRAM(s) Oral daily  heparin   Injectable 5000 Unit(s) SubCutaneous every 12 hours  influenza   Vaccine 0.5 milliLiter(s) IntraMuscular once  insulin glargine Injectable (LANTUS) 16 Unit(s) SubCutaneous at bedtime  insulin lispro (HumaLOG) corrective regimen sliding scale   SubCutaneous three times a day before meals  lactulose Syrup 15 Gram(s) Oral two times a day  lidocaine   Patch 1 Patch Transdermal daily  predniSONE   Tablet 2.5 milliGRAM(s) Oral daily  sodium zirconium cyclosilicate 10 Gram(s) Oral daily  spironolactone 25 milliGRAM(s) Oral daily    MEDICATIONS  (PRN):  acetaminophen   Tablet .. 650 milliGRAM(s) Oral every 6 hours PRN Temp greater or equal to 38C (100.4F), Mild Pain (1 - 3)  dextrose 40% Gel 15 Gram(s) Oral once PRN Blood Glucose LESS THAN 70 milliGRAM(s)/deciliter  glucagon  Injectable 1 milliGRAM(s) IntraMuscular once PRN Glucose LESS THAN 70 milligrams/deciliter  oxycodone    5 mG/acetaminophen 325 mG 1 Tablet(s) Oral every 4 hours PRN Moderate Pain (4 - 6)      Allergies    penicillin (Other)    Intolerances          Vital Signs Last 24 Hrs  T(C): 36.5 (16 Oct 2020 11:30), Max: 36.5 (16 Oct 2020 11:30)  T(F): 97.7 (16 Oct 2020 11:30), Max: 97.7 (16 Oct 2020 11:30)  HR: 55 (16 Oct 2020 11:30) (54 - 60)  BP: 142/73 (16 Oct 2020 09:53) (132/78 - 178/82)  BP(mean): --  RR: 18 (16 Oct 2020 09:53) (18 - 18)  SpO2: 100% (16 Oct 2020 09:53) (98% - 100%)    PHYSICAL EXAM:  GENERAL: Appears chronically ill  NECK: Supple, no jvd  NERVOUS SYSTEM:  Alert & Oriented X3; no asterixis  CHEST/LUNG: Clear  bilaterally  HEART: Regular rate and rhythm; No rub  ABDOMEN: Soft, Nontender, Nondistended; BS+  EXTREMITIES: trace edema     LABS:                        12.0   7.80  )-----------( 159      ( 16 Oct 2020 06:03 )             37.4     10-16    137  |  100  |  84.0<H>  ----------------------------<  79  5.0   |  24.0  |  2.13<H>      Creatinine, Serum: 1.83 mg/dL (10.09.20)    Ca    9.5      16 Oct 2020 06:03    TPro  6.3<L>  /  Alb  3.5  /  TBili  0.7  /  DBili  x   /  AST  33<H>  /  ALT  15  /  AlkPhos  142<H>  10-16            RADIOLOGY & ADDITIONAL TESTS:  < from: US Renal (10.12.20 @ 16:28) >     EXAM:  US KIDNEY(S)                          PROCEDURE DATE:  10/12/2020          INTERPRETATION:  CLINICAL INFORMATION: Acute kidney injury on chronic kidney disease. Shortness of breath.    COMPARISON: Abdominal sonogram 9/12/2020    TECHNIQUE: Sonography of the kidneys and bladder.    FINDINGS:    Right kidney: 9.9 (cm). No renal mass, hydronephrosis or calculi. Multiple small cysts. The largest cyst measures 2.2 x 1.9 cm.    Left kidney: 9.9 (cm). No renal mass, hydronephrosis or calculi. Multiple cysts. The largest cyst measures 2.1 x 1.8 cm.    Urinary bladder: The bladder is distended. No focal bladder abnormality.    Nodular liver surface noted with ascites.    IMPRESSION:    Bilateral simple renal cortical cysts. Otherwise unremarkable. No evidence of hydronephrosis.        < end of copied text >

## 2020-10-16 NOTE — PROGRESS NOTE ADULT - PROBLEM SELECTOR PLAN 1
maintain pigtail right  likely not a candidate for pleurex for h/o cirrhosis  CHF mild  Likely plan for home hospice with chest tube in place.

## 2020-10-16 NOTE — PROGRESS NOTE ADULT - SUBJECTIVE AND OBJECTIVE BOX
CC:  follow up GOC  INTERVAL HPI/OVERNIGHT EVENTS:    PRESENT SYMPTOMS: SOURCE:  Patient/Family/Team    PAIN SCALE:  0 = none  1 = mild   2 = moderate  3 = severe    Pain: 1    Dyspnea:  [ ] YES [x ] NO  Anxiety:  [ ] YES [ x] NO  Fatigue: [x ] YES [ ] NO  Nausea: [ ] YES [ x] NO  Loss of Appetite: [x ] YES [ ] NO  Other symptoms: __________    MEDICATIONS  (STANDING):  ATENolol  Tablet 50 milliGRAM(s) Oral two times a day  cloNIDine 0.2 milliGRAM(s) Oral two times a day  dextrose 5%. 1000 milliLiter(s) (50 mL/Hr) IV Continuous <Continuous>  dextrose 50% Injectable 12.5 Gram(s) IV Push once  dextrose 50% Injectable 25 Gram(s) IV Push once  dextrose 50% Injectable 25 Gram(s) IV Push once  furosemide    Tablet 20 milliGRAM(s) Oral daily  heparin   Injectable 5000 Unit(s) SubCutaneous every 12 hours  influenza   Vaccine 0.5 milliLiter(s) IntraMuscular once  insulin glargine Injectable (LANTUS) 16 Unit(s) SubCutaneous at bedtime  insulin lispro (HumaLOG) corrective regimen sliding scale   SubCutaneous three times a day before meals  lactulose Syrup 15 Gram(s) Oral two times a day  lidocaine   Patch 1 Patch Transdermal daily  predniSONE   Tablet 2.5 milliGRAM(s) Oral daily  sodium zirconium cyclosilicate 10 Gram(s) Oral daily  spironolactone 25 milliGRAM(s) Oral daily    MEDICATIONS  (PRN):  acetaminophen   Tablet .. 650 milliGRAM(s) Oral every 6 hours PRN Temp greater or equal to 38C (100.4F), Mild Pain (1 - 3)  dextrose 40% Gel 15 Gram(s) Oral once PRN Blood Glucose LESS THAN 70 milliGRAM(s)/deciliter  glucagon  Injectable 1 milliGRAM(s) IntraMuscular once PRN Glucose LESS THAN 70 milligrams/deciliter  oxycodone    5 mG/acetaminophen 325 mG 1 Tablet(s) Oral every 4 hours PRN Moderate Pain (4 - 6)      Allergies    penicillin (Other)    Intolerances    Karnofsky Performance Score/Palliative Performance Status Version 2:  40   %    Vital Signs Last 24 Hrs  T(C): 36.4 (16 Oct 2020 09:53), Max: 36.4 (15 Oct 2020 15:50)  T(F): 97.6 (16 Oct 2020 09:53), Max: 97.6 (16 Oct 2020 09:53)  HR: 54 (16 Oct 2020 09:53) (54 - 60)  BP: 142/73 (16 Oct 2020 09:53) (132/78 - 178/82)  BP(mean): --  RR: 18 (16 Oct 2020 09:53) (18 - 22)  SpO2: 100% (16 Oct 2020 09:53) (98% - 100%)    PHYSICAL EXAM:    General: elderly woman, AOx4 NAD  HEENT: [x ] normal  [ ] dry mouth  [ ] ET tube/trach    Lungs: [ x] comfortable  + CT with drainage  CV: [ x] normal  [ ] tachycardia    GI: [ x] normal  [ ] distended  [ ] tender  [ ] no BS               [ ] PEG/NG/OG tube    : [x ] normal  [ ] incontinent  [ ] oliguria/anuria  [ ] hdez    MSK: [ ] normal  [ x] weakness  [ ] edema             [ ] ambulatory  [x ] bedbound/wheelchair bound    Skin: [ ] normal  [ ] pressure ulcers- Stage_____  [x ] no rash    LABS:                        12.0   7.80  )-----------( 159      ( 16 Oct 2020 06:03 )             37.4     10-16    137  |  100  |  84.0<H>  ----------------------------<  79  5.0   |  24.0  |  2.13<H>    Ca    9.5      16 Oct 2020 06:03    TPro  6.3<L>  /  Alb  3.5  /  TBili  0.7  /  DBili  x   /  AST  33<H>  /  ALT  15  /  AlkPhos  142<H>  10-16        I&O's Summary    15 Oct 2020 07:01  -  16 Oct 2020 07:00  --------------------------------------------------------  IN: 660 mL / OUT: 1820 mL / NET: -1160 mL        RADIOLOGY & ADDITIONAL STUDIES:

## 2020-10-16 NOTE — PROGRESS NOTE ADULT - ASSESSMENT
85 yr woman hx of cirrhosis, CHF, afib admitted after a mechanical fall  with sx of SOB found to have large right pleural effusion associated with HF, cirrhosis  and DEVYN likely on CKD     Problem/Recommendation - 1:  Problem: Pleural effusion. 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   cytology negative  CTS following - likely not a candidate for Pleurex  Oxycodone renewed for pain       Problem/Recommendation - 2:  ·  Problem: Cirrhosis.  Recommendation: with Ascites  Noted GI input- continuing with salt poor albumin.      Problem/Recommendation - 3:  ·  Problem: DEVYN (acute kidney injury).  Recommendation: Renal following.  cont medical optimization  patient states would NOT want dialysis.      Problem/Recommendation - 4:  ·  Problem: CHF (congestive heart failure).  Recommendation:   cont Spironolactone and Lasix.      Problem/Recommendation - 5:  ·  Problem: Encounter for palliative care.  Recommendation:  Discussed with patient issue with CT with drainage. She reports it is  uncomfortable and wants d/c.   Discussed option to manage palliatively - d/c CT with potential for fluid to accumulate but manage symptoms with morphine. Patient declines.     Discussed with Dr. Busch - daughter agreeable to home hospice rather than [facility]   85 yr woman hx of cirrhosis, CHF, afib admitted after a mechanical fall  with sx of SOB found to have large right pleural effusion associated with HF, cirrhosis  and DEVYN likely on CKD     Problem/Recommendation - 1:  Problem: Pleural effusion. 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   cytology negative  CTS following - likely not a candidate for Pleurex  Oxycodone renewed for pain       Problem/Recommendation - 2:  ·  Problem: Cirrhosis.  Recommendation: with Ascites  Noted GI input- continuing with salt poor albumin.      Problem/Recommendation - 3:  ·  Problem: DEVYN (acute kidney injury).  Recommendation: Renal following.  cont medical optimization  patient states would NOT want dialysis.      Problem/Recommendation - 4:  ·  Problem: CHF (congestive heart failure).  Recommendation:   cont Spironolactone and Lasix.      Problem/Recommendation - 5:  ·  Problem: Encounter for palliative care.  Recommendation:  Discussed with patient issue with CT with drainage. She reports it is  uncomfortable and wants d/c.   Discussed option to manage palliatively - d/c CT with potential for fluid to accumulate but manage symptoms with morphine. Patient declines.     Discussed with Dr. Busch - daughter agreeable to home hospice rather than [facility]  Discussed with CT surgery - not a candidate for a pleurex. Can maintain chest tube   85 yr woman hx of cirrhosis, CHF, afib admitted after a mechanical fall  with sx of SOB found to have large right pleural effusion associated with HF, cirrhosis  and DEVYN likely on CKD     Problem/Recommendation - 1:  Problem: Pleural effusion. 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   cytology negative  CTS following - likely not a candidate for Pleurex  Oxycodone renewed for pain       Problem/Recommendation - 2:  ·  Problem: Cirrhosis.  Recommendation: with Ascites  Noted GI input- continuing with salt poor albumin.      Problem/Recommendation - 3:  ·  Problem: DEVYN (acute kidney injury).  Recommendation: Renal following.  cont medical optimization  patient states would NOT want dialysis.      Problem/Recommendation - 4:  ·  Problem: CHF (congestive heart failure).  Recommendation:   cont Spironolactone and Lasix.      Problem/Recommendation - 5:  ·  Problem: Encounter for palliative care.  Recommendation:  Discussed with patient issue with CT with drainage. She reports it is  uncomfortable and wants d/c.   Discussed option to manage palliatively - d/c CT with potential for fluid to accumulate but manage symptoms with morphine. Patient declines.     Discussed with Dr. Busch - daughter agreeable to home hospice rather than [facility]  Discussed with CT surgery - not a candidate for a pleurex contraindaction with cirrhosis. Can maintain chest tube and possible outpatient thoracentesis.  Per hospice - cannot manage CT at home

## 2020-10-16 NOTE — DISCHARGE NOTE PROVIDER - NSDCCPCAREPLAN_GEN_ALL_CORE_FT
PRINCIPAL DISCHARGE DIAGNOSIS  Diagnosis: Pleural effusion  Assessment and Plan of Treatment:       SECONDARY DISCHARGE DIAGNOSES  Diagnosis: CHF (congestive heart failure)  Assessment and Plan of Treatment:

## 2020-10-16 NOTE — PROGRESS NOTE ADULT - SUBJECTIVE AND OBJECTIVE BOX
HPI:  85 year old F with PMH of HTN, CHF, cirrhosis with ascites, HLD, DM, AFib (no A/C) presented to ED s/p fall, reported as mechanical fall, tripped and fell, denies loss of consciousness or head injury. Patient stated she had worsening shortness of breath for the past 3 days. Patient was evaluated by her cardiologist Dr. Camp and pulmonologist Dr. Cotto on 2020, CXR outpatient which showed right sided pleural effusion and was recommended to get drained and was scheduled for follow up visit today 10/09. In the Ed pt was hypoxic, tachypneic and mild to moderate respiratory distress requiring 4L NC, CXR showed large pleural effusion, given Lasix 80mg IV push, evaluated by ct surgery for possible chest tube placement, pending Ct chest w/o contrast. Patient denies fevers/chills, dizziness, headache, chest pain, palpitation, cough, abd pain/n/v/d, urinary symptoms, recent travel or sick contact.      (09 Oct 2020 04:49)    10/16 - patient seen and examined bedside. no complaints at present. denies CP, SOB, HA, nausea, vomiting, dizziness.      PAST MEDICAL & SURGICAL HISTORY:  Ascites    Cirrhosis    DM (diabetes mellitus)    CHF (congestive heart failure)    HTN (hypertension)    S/P hernia repair          acetaminophen   Tablet .. 650 milliGRAM(s) Oral every 6 hours PRN  ATENolol  Tablet 50 milliGRAM(s) Oral two times a day  cloNIDine 0.2 milliGRAM(s) Oral two times a day  dextrose 40% Gel 15 Gram(s) Oral once PRN  dextrose 5%. 1000 milliLiter(s) IV Continuous <Continuous>  dextrose 50% Injectable 12.5 Gram(s) IV Push once  dextrose 50% Injectable 25 Gram(s) IV Push once  dextrose 50% Injectable 25 Gram(s) IV Push once  furosemide    Tablet 20 milliGRAM(s) Oral daily  glucagon  Injectable 1 milliGRAM(s) IntraMuscular once PRN  heparin   Injectable 5000 Unit(s) SubCutaneous every 12 hours  influenza   Vaccine 0.5 milliLiter(s) IntraMuscular once  insulin glargine Injectable (LANTUS) 16 Unit(s) SubCutaneous at bedtime  insulin lispro (HumaLOG) corrective regimen sliding scale   SubCutaneous three times a day before meals  lactulose Syrup 15 Gram(s) Oral two times a day  lidocaine   Patch 1 Patch Transdermal daily  oxycodone    5 mG/acetaminophen 325 mG 1 Tablet(s) Oral every 4 hours PRN  predniSONE   Tablet 2.5 milliGRAM(s) Oral daily  sodium zirconium cyclosilicate 10 Gram(s) Oral daily  spironolactone 25 milliGRAM(s) Oral daily  MEDICATIONS  (PRN):  acetaminophen   Tablet .. 650 milliGRAM(s) Oral every 6 hours PRN Temp greater or equal to 38C (100.4F), Mild Pain (1 - 3)  dextrose 40% Gel 15 Gram(s) Oral once PRN Blood Glucose LESS THAN 70 milliGRAM(s)/deciliter  glucagon  Injectable 1 milliGRAM(s) IntraMuscular once PRN Glucose LESS THAN 70 milligrams/deciliter  oxycodone    5 mG/acetaminophen 325 mG 1 Tablet(s) Oral every 4 hours PRN Moderate Pain (4 - 6)      Daily     Daily Weight in k.6 (16 Oct 2020 04:24)                              12.0   7.80  )-----------( 159      ( 16 Oct 2020 06:03 )             37.4   10-16    137  |  100  |  84.0<H>  ----------------------------<  79  5.0   |  24.0  |  2.13<H>    Ca    9.5      16 Oct 2020 06:03    TPro  6.3<L>  /  Alb  3.5  /  TBili  0.7  /  DBili  x   /  AST  33<H>  /  ALT  15  /  AlkPhos  142<H>  10-16            Objective:  T(C): 36.4 (10-16-20 @ 09:53), Max: 36.4 (10-15-20 @ 15:50)  HR: 54 (10-16-20 @ 09:53) (54 - 60)  BP: 142/73 (10-16-20 @ 09:53) (132/78 - 178/82)  RR: 18 (10-16-20 @ 09:53) (18 - 22)  SpO2: 100% (10-16-20 @ 09:53) (98% - 100%)  Wt(kg): --CAPILLARY BLOOD GLUCOSE      POCT Blood Glucose.: 142 mg/dL (16 Oct 2020 07:56)  POCT Blood Glucose.: 147 mg/dL (15 Oct 2020 21:18)  POCT Blood Glucose.: 157 mg/dL (15 Oct 2020 17:04)  POCT Blood Glucose.: 80 mg/dL (15 Oct 2020 12:08)  I&O's Summary    15 Oct 2020 07:01  -  16 Oct 2020 07:00  --------------------------------------------------------  IN: 660 mL / OUT: 1820 mL / NET: -1160 mL        Physical Exam  Neuro: A+O x 3, non-focal, speech clear and intact  Pulm: CTA, equal bilaterally  CV: RRR, +S1S2, no m/r/g  Abd: soft, NT, ND, +BS  Ext: +DP Pulses b/l, , no edema  Chest tubes: RT pleural pigtail catheter to WS, no air leaks, draining appropriately serous fluid.    Imaging:  CXR: < from: Xray Chest 1 View- PORTABLE-Routine (Xray Chest 1 View- PORTABLE-Routine in AM.) (10.15.20 @ 06:13) >      INTERPRETATION:  Portable chest radiograph    CLINICAL INFORMATION:   Short of breath.    TECHNIQUE:  Portable  AP view of the chest was obtained.    COMPARISON: No previous examinations are available for review.    FINDINGS: There is no interval change.  RIGHT multi-sidehole pigtail catheter overlies RIGHT lower hemithorax.    5% RIGHT apical pneumothorax.  /./.  The lungs are clear of airspace consolidations or effusions.  The heart and mediastinum are within normal limits.    Visualized osseous structures are intact.        IMPRESSION: No interval change.    5% RIGHT apical pneumothorax.          < end of copied text >

## 2020-10-16 NOTE — DISCHARGE NOTE PROVIDER - HOSPITAL COURSE
Patient is a 84 yo F w/ PMH of HTN, CHF, Cirrhosis, HLD, Afib not on AC who presented s/p mechanical fall. Admitted for acute hyperemic respiratory failure due to R pleural effusion in the setting of cirrhosis/CHF. CTS placed R chest tube. CXR showed residual PTX despite drainage, likely trapped lung. Cardiology was consulted and patient was started on IV diuresis. Patient found to have DEVYN on admission and Nephrology was consulted. Patient also had hypertensive urgency on admission which resolved with BP med adjustment. GI was consulted and paracentesis was initially on hold due to concern for hepatorenal syndrome and worsening renal function. Patient was then given albumin to improve intravascular volume. As per GI - not much ascites to drain. Family and patient were updated regarding patient’s long term poor prognosis. Palliative was consulted and patient chose to be DNR/DNI and elected for home hospice. Pulmonary was also consulted and agreed with hospice care. Patient was seen by PT who initially recommended [facility] however patient refused. CT recommended ***** 85 year old F with PMH of HTN, CHF, cirrhosis with ascites, HLD, DM, AFib (no A/C) presented to ED s/p fall, reported as mechanical fall, tripped and fell, denies loss of consciousness or head injury. Patient stated she had worsening shortness of breath. Patient worked up and found to have large right pleural effusion associated with HF, cirrhosis  and DEVYN likely on CKD. Patient seen by multiple speciliaties and had ct placed in right lung. patient is now stable for dc to [facility] with dnr dni and a do not hospitalize    Acute hypoxic respiratory failure due to right pleural effusion, likely hepatic hydrothorax, improved. trapped lung  - CTS, Pulm following   - will pull out chest tube prior to dc   Volume overload 2/2 to cirrhosis, improved.   - Cardiology signed off. c.w lasix and aldactone  - S/p albumin, as per GI no need for paracentesis at this time due to small amt of ascites.   - Ammonia elevated -lactulose on dc  asymptomatic bradycardia - atneolol  DEVYN vs CKD,=cr stable   HTN- home meds  IDDM  - Levemir   Arthritis   -cont with prednisone   Severe protein calorie malnutrition  - Supplementation    pe  CONSTITUTIONAL: Elderly female, frail, in NAD, pleasant.   HEENT: NC/AT, PERRL, no JVD  RESPIRATORY: Decreased breath sounds R lung  CARDIOVASCULAR:  s1s2+  ABDOMEN: Mildly distended abdomen, non-tender, BS+  MUSCLOSKELETAL: No edema, cyanosis, deformities  PSYCH: A+O to person, place, and time; affect appropriate  NEUROLOGY: CN 2-12 are intact and symmetric; no gross neurological deficits.  SKIN: No rashes; no palpable lesions  VASC: Distal pulses palpable        time spent on dc 34 minutes

## 2020-10-16 NOTE — PROGRESS NOTE ADULT - ASSESSMENT
CKD suspect stage III (baseline Screat 1.8)   h/o HTN, CHF, cirrhosis with ascites, HLD, AFib (no A/C)   DM poor control A1C 8.5==> 24 hr urine study= 2100 mg proteinuria  Large R pleural effusion s/p CT   No obstruction on renal sono  - avoid potential nephrotoxins  - cont Lasix and Aldactone  - monitor labs

## 2020-10-16 NOTE — PROGRESS NOTE ADULT - SUBJECTIVE AND OBJECTIVE BOX
PULMONARY PROGRESS NOTE      BHAVIK BLANDON  MRN-[id number]    Patient is a 85y old  Female who presents with a chief complaint of right Pleural effusion (16 Oct 2020 12:43)        INTERVAL HPI/OVERNIGHT EVENTS:  No acute events. Pigtail catheter remains in place.    MEDICATIONS  (STANDING):  ATENolol  Tablet 50 milliGRAM(s) Oral two times a day  cloNIDine 0.2 milliGRAM(s) Oral two times a day  dextrose 5%. 1000 milliLiter(s) (50 mL/Hr) IV Continuous <Continuous>  dextrose 50% Injectable 12.5 Gram(s) IV Push once  dextrose 50% Injectable 25 Gram(s) IV Push once  dextrose 50% Injectable 25 Gram(s) IV Push once  furosemide    Tablet 20 milliGRAM(s) Oral daily  heparin   Injectable 5000 Unit(s) SubCutaneous every 12 hours  influenza   Vaccine 0.5 milliLiter(s) IntraMuscular once  insulin glargine Injectable (LANTUS) 16 Unit(s) SubCutaneous at bedtime  insulin lispro (HumaLOG) corrective regimen sliding scale   SubCutaneous three times a day before meals  lactulose Syrup 15 Gram(s) Oral two times a day  lidocaine   Patch 1 Patch Transdermal daily  predniSONE   Tablet 2.5 milliGRAM(s) Oral daily  sodium zirconium cyclosilicate 10 Gram(s) Oral daily  spironolactone 25 milliGRAM(s) Oral daily    MEDICATIONS  (PRN):  acetaminophen   Tablet .. 650 milliGRAM(s) Oral every 6 hours PRN Temp greater or equal to 38C (100.4F), Mild Pain (1 - 3)  dextrose 40% Gel 15 Gram(s) Oral once PRN Blood Glucose LESS THAN 70 milliGRAM(s)/deciliter  glucagon  Injectable 1 milliGRAM(s) IntraMuscular once PRN Glucose LESS THAN 70 milligrams/deciliter  oxycodone    5 mG/acetaminophen 325 mG 1 Tablet(s) Oral every 4 hours PRN Moderate Pain (4 - 6)    Allergies    penicillin (Other)    Intolerances      PAST MEDICAL & SURGICAL HISTORY:  Ascites    Cirrhosis    DM (diabetes mellitus)    CHF (congestive heart failure)    HTN (hypertension)    S/P hernia repair          REVIEW OF SYSTEMS:  Negative except as noted in HPI      Vital Signs Last 24 Hrs  T(C): 36.5 (16 Oct 2020 11:30), Max: 36.5 (16 Oct 2020 11:30)  T(F): 97.7 (16 Oct 2020 11:30), Max: 97.7 (16 Oct 2020 11:30)  HR: 55 (16 Oct 2020 11:30) (54 - 59)  BP: 168/83 (16 Oct 2020 11:30) (139/71 - 178/82)  BP(mean): --  RR: 18 (16 Oct 2020 11:30) (18 - 18)  SpO2: 100% (16 Oct 2020 11:30) (100% - 100%)      PHYSICAL EXAMINATION:  GEN: In no apparent distress  HEENT: NC/AT  NECK: Supple  CV: +S1, S2  RESPIRATORY: Diminished breath sounds in R lung base; good inspiratory effort; non-labored breathing  ABDOMEN: Soft, NT/ND  EXTREMITIES: No rashes, lesions, or pitting edema noted  NEURO: Grossly intact  PSYCH: Normal affect      LABS:                        12.0   7.80  )-----------( 159      ( 16 Oct 2020 06:03 )             37.4     10-16    137  |  100  |  84.0<H>  ----------------------------<  79  5.0   |  24.0  |  2.13<H>    Ca    9.5      16 Oct 2020 06:03    TPro  6.3<L>  /  Alb  3.5  /  TBili  0.7  /  DBili  x   /  AST  33<H>  /  ALT  15  /  AlkPhos  142<H>  10-16                    Procalcitonin, Serum: 0.31 ng/mL (10-15-20 @ 08:29)      MICROBIOLOGY:  COVID-19 PCR . (10.09.20 @ 04:03)    COVID-19 PCR: NotDetec: Testing is performed using polymerase chain reaction (PCR) or  transcription mediated amplification (TMA). This COVID-19 (SARS-CoV-2)  nucleic acid amplification test was validated by [employer] and is  in use under the FDA Emergency Use Authorization (EUA) for clinical labs  CLIA-certified to perform high complexity testing. Test results should be  correlated with clinical presentation, patient history, and epidemiology.        RADIOLOGY & ADDITIONAL STUDIES:  < from: Xray Chest 1 View- PORTABLE-Routine (Xray Chest 1 View- PORTABLE-Routine in AM.) (10.16.20 @ 05:46) >  FINDINGS:  The lungs are clear of airspace consolidations or effusions.  No pneumothorax.  RIGHT multi-sidehole pigtail catheter overlies RIGHT lower hemithorax.    The heart and mediastinumare within normal limits.    Visualized osseous structures are intact.        IMPRESSION:   No evidence of active chest disease.    RIGHT multi-sidehole pigtail catheter overlies RIGHT lower hemithorax.    < end of copied text >      ECHO:

## 2020-10-16 NOTE — DISCHARGE NOTE PROVIDER - NSDCMRMEDTOKEN_GEN_ALL_CORE_FT
atenolol 50 mg oral tablet: 1 tab(s) orally 2 times a day  cloNIDine 0.2 mg oral tablet: 1 tab(s) orally 2 times a day  furosemide 40 mg oral tablet: 1 tab(s) orally once a day  Levemir 100 units/mL subcutaneous solution: 12 unit(s) subcutaneous once a day (at bedtime)  predniSONE 2.5 mg oral tablet: 1 tab(s) orally once a day  spironolactone 25 mg oral tablet: 1 tab(s) orally once a day   atenolol 25 mg oral tablet: 1 tab(s) orally once a day  furosemide 40 mg oral tablet: 1 tab(s) orally once a day  hydrALAZINE 25 mg oral tablet: 1 tab(s) orally 3 times a day  lactulose 10 g/15 mL oral syrup: 22.5 milliliter(s) orally 2 times a day  Levemir 100 units/mL subcutaneous solution: 10 unit(s) subcutaneous once a day (at bedtime)  lidocaine 5% topical film: Apply topically to affected area once a day  oxycodone-acetaminophen 5 mg-325 mg oral tablet: 1 tab(s) orally every 4 hours, As needed, Moderate Pain (4 - 6)  predniSONE 2.5 mg oral tablet: 1 tab(s) orally once a day  spironolactone 25 mg oral tablet: 1 tab(s) orally once a day

## 2020-10-16 NOTE — PROGRESS NOTE ADULT - SUBJECTIVE AND OBJECTIVE BOX
[facility] Division of Hospital Medicine  Harlan Busch [phone]    Chief Complaint:  Patient is a 85y old  Female who presents with a chief complaint of right Pleural effusion (10 Oct 2020 13:17)      SUBJECTIVE / OVERNIGHT EVENTS:  Patient seen and examined at bedside. Patient reports having a bad dream last night. Endores tremors, otherwise no new complaints. States her breathing is improved.     MEDICATIONS  (STANDING):  ATENolol  Tablet 50 milliGRAM(s) Oral two times a day  cloNIDine 0.2 milliGRAM(s) Oral two times a day  dextrose 5%. 1000 milliLiter(s) (50 mL/Hr) IV Continuous <Continuous>  dextrose 50% Injectable 12.5 Gram(s) IV Push once  dextrose 50% Injectable 25 Gram(s) IV Push once  dextrose 50% Injectable 25 Gram(s) IV Push once  furosemide   Injectable 40 milliGRAM(s) IV Push two times a day  heparin   Injectable 5000 Unit(s) SubCutaneous every 12 hours  insulin glargine Injectable (LANTUS) 10 Unit(s) SubCutaneous at bedtime  insulin lispro (HumaLOG) corrective regimen sliding scale   SubCutaneous three times a day before meals  lidocaine   Patch 1 Patch Transdermal daily  predniSONE   Tablet 2.5 milliGRAM(s) Oral daily  spironolactone 25 milliGRAM(s) Oral daily    MEDICATIONS  (PRN):  acetaminophen   Tablet .. 650 milliGRAM(s) Oral every 6 hours PRN Temp greater or equal to 38C (100.4F), Mild Pain (1 - 3)  dextrose 40% Gel 15 Gram(s) Oral once PRN Blood Glucose LESS THAN 70 milliGRAM(s)/deciliter  glucagon  Injectable 1 milliGRAM(s) IntraMuscular once PRN Glucose LESS THAN 70 milligrams/deciliter  oxycodone    5 mG/acetaminophen 325 mG 1 Tablet(s) Oral every 4 hours PRN Moderate Pain (4 - 6)        I&O's Summary    09 Oct 2020 07:  -  10 Oct 2020 07:00  --------------------------------------------------------  IN: 0 mL / OUT: 5440 mL / NET: -5440 mL    10 Oct 2020 07:  -  10 Oct 2020 14:09  --------------------------------------------------------  IN: 0 mL / OUT: 500 mL / NET: -500 mL        PHYSICAL EXAM:  Vital Signs Last 24 Hrs  T(C): 36.6 (10 Oct 2020 11:30), Max: 36.6 (10 Oct 2020 11:30)  T(F): 97.9 (10 Oct 2020 11:30), Max: 97.9 (10 Oct 2020 11:30)  HR: 60 (10 Oct 2020 11:30) (56 - 76)  BP: 102/61 (10 Oct 2020 11:30) (102/61 - 184/68)  BP(mean): --  RR: 18 (10 Oct 2020 11:30) (18 - 19)  SpO2: 97% (10 Oct 2020 11:30) (94% - 99%)        CONSTITUTIONAL: Elderly female, frail, in NAD, pleasant. Tremulous.   HEENT: NC/AT, PERRL, no JVD  RESPIRATORY: Decreased breath sounds R lung  CARDIOVASCULAR: RRR, S1/S2+, no m/g/r  ABDOMEN: Mildly distended abdomen, non-tender, BS+  MUSCLOSKELETAL: No edema, cyanosis, deformities  PSYCH: A+O to person, place, and time; affect appropriate  NEUROLOGY: CN 2-12 are intact and symmetric; no gross neurological deficits.  SKIN: No rashes; no palpable lesions  VASC: Distal pulses palpable    LABS:                        12.0   8.91  )-----------( 135      ( 10 Oct 2020 03:17 )             37.1     10-10    137  |  101  |  61.0<H>  ----------------------------<  260<H>  5.1   |  25.0  |  1.99<H>    Ca    8.6      10 Oct 2020 03:17  Phos  4.0     10-10  Mg     1.9     10-10    TPro  6.8  /  Alb  2.8<L>  /  TBili  0.6  /  DBili  x   /  AST  35<H>  /  ALT  19  /  AlkPhos  163<H>  10-09    PT/INR - ( 09 Oct 2020 01:49 )   PT: 11.9 sec;   INR: 1.03 ratio         PTT - ( 09 Oct 2020 01:49 )  PTT:29.7 sec  CARDIAC MARKERS ( 09 Oct 2020 01:49 )  x     / 0.03 ng/mL / x     / x     / x          Urinalysis Basic - ( 09 Oct 2020 06:26 )    Color: Yellow / Appearance: Clear / S.010 / pH: x  Gluc: x / Ketone: Negative  / Bili: Negative / Urobili: Negative mg/dL   Blood: x / Protein: 100 mg/dL / Nitrite: Negative   Leuk Esterase: Negative / RBC: 11-25 /HPF / WBC 3-5   Sq Epi: x / Non Sq Epi: Occasional / Bacteria: Few        Culture - Body Fluid with Gram Stain (collected 09 Oct 2020 16:33)  Source: .Body Fluid Pleural Fluid  Gram Stain (09 Oct 2020 19:22):    No polymorphonuclear cells seen    No organisms seen    by cytocentrifuge  Preliminary Report (10 Oct 2020 12:47):    No growth      CAPILLARY BLOOD GLUCOSE      POCT Blood Glucose.: 273 mg/dL (10 Oct 2020 11:27)  POCT Blood Glucose.: 259 mg/dL (10 Oct 2020 08:12)  POCT Blood Glucose.: 221 mg/dL (09 Oct 2020 16:16)        RADIOLOGY & ADDITIONAL TESTS:  Results Reviewed:   Imaging Personally Reviewed:  Electrocardiogram Personally Reviewed:

## 2020-10-17 LAB
ANION GAP SERPL CALC-SCNC: 15 MMOL/L — SIGNIFICANT CHANGE UP (ref 5–17)
BUN SERPL-MCNC: 89 MG/DL — HIGH (ref 8–20)
CALCIUM SERPL-MCNC: 8.7 MG/DL — SIGNIFICANT CHANGE UP (ref 8.6–10.2)
CHLORIDE SERPL-SCNC: 98 MMOL/L — SIGNIFICANT CHANGE UP (ref 98–107)
CO2 SERPL-SCNC: 23 MMOL/L — SIGNIFICANT CHANGE UP (ref 22–29)
CREAT SERPL-MCNC: 2.17 MG/DL — HIGH (ref 0.5–1.3)
GLUCOSE BLDC GLUCOMTR-MCNC: 148 MG/DL — HIGH (ref 70–99)
GLUCOSE BLDC GLUCOMTR-MCNC: 177 MG/DL — HIGH (ref 70–99)
GLUCOSE BLDC GLUCOMTR-MCNC: 179 MG/DL — HIGH (ref 70–99)
GLUCOSE BLDC GLUCOMTR-MCNC: 318 MG/DL — HIGH (ref 70–99)
GLUCOSE SERPL-MCNC: 145 MG/DL — HIGH (ref 70–99)
HCT VFR BLD CALC: 33.8 % — LOW (ref 34.5–45)
HGB BLD-MCNC: 10.9 G/DL — LOW (ref 11.5–15.5)
MCHC RBC-ENTMCNC: 30.7 PG — SIGNIFICANT CHANGE UP (ref 27–34)
MCHC RBC-ENTMCNC: 32.2 GM/DL — SIGNIFICANT CHANGE UP (ref 32–36)
MCV RBC AUTO: 95.2 FL — SIGNIFICANT CHANGE UP (ref 80–100)
PLATELET # BLD AUTO: 135 K/UL — LOW (ref 150–400)
POTASSIUM SERPL-MCNC: 3.8 MMOL/L — SIGNIFICANT CHANGE UP (ref 3.5–5.3)
POTASSIUM SERPL-SCNC: 3.8 MMOL/L — SIGNIFICANT CHANGE UP (ref 3.5–5.3)
RBC # BLD: 3.55 M/UL — LOW (ref 3.8–5.2)
RBC # FLD: 13.9 % — SIGNIFICANT CHANGE UP (ref 10.3–14.5)
SODIUM SERPL-SCNC: 136 MMOL/L — SIGNIFICANT CHANGE UP (ref 135–145)
WBC # BLD: 6.76 K/UL — SIGNIFICANT CHANGE UP (ref 3.8–10.5)
WBC # FLD AUTO: 6.76 K/UL — SIGNIFICANT CHANGE UP (ref 3.8–10.5)

## 2020-10-17 PROCEDURE — 71045 X-RAY EXAM CHEST 1 VIEW: CPT | Mod: 26

## 2020-10-17 PROCEDURE — 99233 SBSQ HOSP IP/OBS HIGH 50: CPT

## 2020-10-17 PROCEDURE — 99231 SBSQ HOSP IP/OBS SF/LOW 25: CPT

## 2020-10-17 RX ORDER — HYDRALAZINE HCL 50 MG
5 TABLET ORAL EVERY 6 HOURS
Refills: 0 | Status: DISCONTINUED | OUTPATIENT
Start: 2020-10-17 | End: 2020-10-20

## 2020-10-17 RX ORDER — FUROSEMIDE 40 MG
40 TABLET ORAL DAILY
Refills: 0 | Status: DISCONTINUED | OUTPATIENT
Start: 2020-10-17 | End: 2020-10-20

## 2020-10-17 RX ADMIN — Medication 20 MILLIGRAM(S): at 06:01

## 2020-10-17 RX ADMIN — ATENOLOL 50 MILLIGRAM(S): 25 TABLET ORAL at 06:01

## 2020-10-17 RX ADMIN — Medication 0.2 MILLIGRAM(S): at 06:01

## 2020-10-17 RX ADMIN — SPIRONOLACTONE 25 MILLIGRAM(S): 25 TABLET, FILM COATED ORAL at 06:01

## 2020-10-17 RX ADMIN — Medication 4: at 12:39

## 2020-10-17 RX ADMIN — Medication 1: at 16:39

## 2020-10-17 RX ADMIN — Medication 40 MILLIGRAM(S): at 10:30

## 2020-10-17 RX ADMIN — LIDOCAINE 1 PATCH: 4 CREAM TOPICAL at 07:35

## 2020-10-17 RX ADMIN — Medication 0.2 MILLIGRAM(S): at 18:02

## 2020-10-17 RX ADMIN — HEPARIN SODIUM 5000 UNIT(S): 5000 INJECTION INTRAVENOUS; SUBCUTANEOUS at 21:42

## 2020-10-17 RX ADMIN — LIDOCAINE 1 PATCH: 4 CREAM TOPICAL at 12:38

## 2020-10-17 RX ADMIN — LACTULOSE 15 GRAM(S): 10 SOLUTION ORAL at 06:01

## 2020-10-17 RX ADMIN — LIDOCAINE 1 PATCH: 4 CREAM TOPICAL at 20:06

## 2020-10-17 RX ADMIN — INSULIN GLARGINE 16 UNIT(S): 100 INJECTION, SOLUTION SUBCUTANEOUS at 21:45

## 2020-10-17 RX ADMIN — ATENOLOL 50 MILLIGRAM(S): 25 TABLET ORAL at 18:03

## 2020-10-17 RX ADMIN — HEPARIN SODIUM 5000 UNIT(S): 5000 INJECTION INTRAVENOUS; SUBCUTANEOUS at 10:31

## 2020-10-17 RX ADMIN — Medication 2.5 MILLIGRAM(S): at 06:01

## 2020-10-17 RX ADMIN — SODIUM ZIRCONIUM CYCLOSILICATE 10 GRAM(S): 10 POWDER, FOR SUSPENSION ORAL at 12:38

## 2020-10-17 NOTE — PROGRESS NOTE ADULT - SUBJECTIVE AND OBJECTIVE BOX
NEPHROLOGY INTERVAL HPI/OVERNIGHT EVENTS:  pt essentially unchanged  no acute sob, cp, n/v/d    MEDICATIONS  (STANDING):  ATENolol  Tablet 50 milliGRAM(s) Oral two times a day  cloNIDine 0.2 milliGRAM(s) Oral two times a day  dextrose 5%. 1000 milliLiter(s) (50 mL/Hr) IV Continuous <Continuous>  dextrose 50% Injectable 12.5 Gram(s) IV Push once  dextrose 50% Injectable 25 Gram(s) IV Push once  dextrose 50% Injectable 25 Gram(s) IV Push once  furosemide    Tablet 20 milliGRAM(s) Oral daily  heparin   Injectable 5000 Unit(s) SubCutaneous every 12 hours  influenza   Vaccine 0.5 milliLiter(s) IntraMuscular once  insulin glargine Injectable (LANTUS) 16 Unit(s) SubCutaneous at bedtime  insulin lispro (HumaLOG) corrective regimen sliding scale   SubCutaneous three times a day before meals  lactulose Syrup 15 Gram(s) Oral two times a day  lidocaine   Patch 1 Patch Transdermal daily  predniSONE   Tablet 2.5 milliGRAM(s) Oral daily  sodium zirconium cyclosilicate 10 Gram(s) Oral daily  spironolactone 25 milliGRAM(s) Oral daily    MEDICATIONS  (PRN):  acetaminophen   Tablet .. 650 milliGRAM(s) Oral every 6 hours PRN Temp greater or equal to 38C (100.4F), Mild Pain (1 - 3)  dextrose 40% Gel 15 Gram(s) Oral once PRN Blood Glucose LESS THAN 70 milliGRAM(s)/deciliter  glucagon  Injectable 1 milliGRAM(s) IntraMuscular once PRN Glucose LESS THAN 70 milligrams/deciliter  oxycodone    5 mG/acetaminophen 325 mG 1 Tablet(s) Oral every 4 hours PRN Moderate Pain (4 - 6)      Allergies    penicillin (Other)        Vital Signs Last 24 Hrs  T(C): 36.8 (17 Oct 2020 04:54), Max: 36.8 (17 Oct 2020 04:54)  T(F): 98.3 (17 Oct 2020 04:54), Max: 98.3 (17 Oct 2020 04:54)  HR: 51 (17 Oct 2020 04:54) (51 - 82)  BP: 151/77 (17 Oct 2020 04:54) (142/73 - 176/83)  BP(mean): --  RR: 18 (17 Oct 2020 04:54) (18 - 18)  SpO2: 98% (17 Oct 2020 04:54) (97% - 100%)    PHYSICAL EXAM:  GENERAL: Frail, debilitated  NECK: Supple, no JVD  NERVOUS SYSTEM:  Alert & Oriented X3; no asterixis  CHEST/LUNG: Diminished BS at bases; R CT  HEART: Regular rate and rhythm; No rub  ABDOMEN: Soft, Nontender, Nondistended; BS+  EXTREMITIES: + improved LE edema    LABS:                        10.9   6.76  )-----------( 135      ( 17 Oct 2020 08:01 )             33.8     10-16    137  |  100  |  84.0<H>  ----------------------------<  79  5.0   |  24.0  |  2.13<H>    Ca    9.5      16 Oct 2020 06:03    TPro  6.3<L>  /  Alb  3.5  /  TBili  0.7  /  DBili  x   /  AST  33<H>  /  ALT  15  /  AlkPhos  142<H>  10-16            RADIOLOGY & ADDITIONAL TESTS:  < from: US Renal (10.12.20 @ 16:28) >   EXAM:  US KIDNEY(S)                          PROCEDURE DATE:  10/12/2020          INTERPRETATION:  CLINICAL INFORMATION: Acute kidney injury on chronic kidney disease. Shortness of breath.    COMPARISON: Abdominal sonogram 9/12/2020    TECHNIQUE: Sonography of the kidneys and bladder.    FINDINGS:    Right kidney: 9.9 (cm). No renal mass, hydronephrosis or calculi. Multiple small cysts. The largest cyst measures 2.2 x 1.9 cm.    Left kidney: 9.9 (cm). No renal mass, hydronephrosis or calculi. Multiple cysts. The largest cyst measures 2.1 x 1.8 cm.    Urinary bladder: The bladder is distended. No focal bladder abnormality.    Nodular liver surface noted with ascites.    IMPRESSION:    Bilateral simple renal cortical cysts. Otherwise unremarkable. No evidence of hydronephrosis.    < end of copied text >      < from: Xray Chest 1 View- PORTABLE-Routine (Xray Chest 1 View- PORTABLE-Routine in AM.) (10.16.20 @ 05:46) >   EXAM:  XR CHEST PORTABLE ROUTINE 1V                          PROCEDURE DATE:  10/16/2020          INTERPRETATION:  Portable chest radiograph    CLINICAL INFORMATION:   Pleural effusion. Cirrhosis.    TECHNIQUE:  Portable  AP view of the chest was obtained.    COMPARISON: 10/15/2020 available for review.    FINDINGS:  The lungs are clear of airspace consolidations or effusions.  No pneumothorax.  RIGHT multi-sidehole pigtail catheter overlies RIGHT lower hemithorax.    The heart and mediastinumare within normal limits.    Visualized osseous structures are intact.        IMPRESSION:   No evidence of active chest disease.    RIGHT multi-sidehole pigtail catheter overlies RIGHT lower hemithorax.    < end of copied text >

## 2020-10-17 NOTE — CHART NOTE - NSCHARTNOTEFT_GEN_A_CORE
PULMONARY    cxr with ptx; likely trapped lung; should do ct to r/o underlying mass though less likely    pt doing well on RA; no resp issues.    D/W T-surg team.
Source: Patient [ ]  Family [ ]   other [X] EMR    Current Diet: Diet, Consistent Carbohydrate/No Snacks:   DASH/TLC {Sodium & Cholesterol Restricted} (DASH)  1000mL Fluid Restriction ([id number])  No Concentrated Potassium (10-15-20 @ 09:51)    PO intake:  < 50% [ ]   50-75%  [ ]   %  [ ]  other : 50-85% per documentation    Source for PO intake [ ] Patient [ ] family [X] chart [ ] staff [ ] other    Current Weight:   10/16/20: 131.3#  10/13/20: 128#  10/12/20: 127.8#  10/11/20: 132.2#  No edema noted. Obtain daily weights to monitor trends.    Pertinent Medications: MEDICATIONS  (STANDING):  ATENolol  Tablet 50 milliGRAM(s) Oral two times a day  cloNIDine 0.2 milliGRAM(s) Oral two times a day  dextrose 5%. 1000 milliLiter(s) (50 mL/Hr) IV Continuous <Continuous>  dextrose 50% Injectable 12.5 Gram(s) IV Push once  dextrose 50% Injectable 25 Gram(s) IV Push once  dextrose 50% Injectable 25 Gram(s) IV Push once  furosemide    Tablet 40 milliGRAM(s) Oral daily  heparin   Injectable 5000 Unit(s) SubCutaneous every 12 hours  influenza   Vaccine 0.5 milliLiter(s) IntraMuscular once  insulin glargine Injectable (LANTUS) 16 Unit(s) SubCutaneous at bedtime  insulin lispro (HumaLOG) corrective regimen sliding scale   SubCutaneous three times a day before meals  lactulose Syrup 15 Gram(s) Oral two times a day  lidocaine   Patch 1 Patch Transdermal daily  predniSONE   Tablet 2.5 milliGRAM(s) Oral daily  sodium zirconium cyclosilicate 10 Gram(s) Oral daily  spironolactone 25 milliGRAM(s) Oral daily    MEDICATIONS  (PRN):  acetaminophen   Tablet .. 650 milliGRAM(s) Oral every 6 hours PRN Temp greater or equal to 38C (100.4F), Mild Pain (1 - 3)  dextrose 40% Gel 15 Gram(s) Oral once PRN Blood Glucose LESS THAN 70 milliGRAM(s)/deciliter  glucagon  Injectable 1 milliGRAM(s) IntraMuscular once PRN Glucose LESS THAN 70 milligrams/deciliter  hydrALAZINE Injectable 5 milliGRAM(s) IV Push every 6 hours PRN for sbp above 160 mmhg  oxycodone    5 mG/acetaminophen 325 mG 1 Tablet(s) Oral every 4 hours PRN Moderate Pain (4 - 6)    Pertinent Labs: CBC Full  -  ( 17 Oct 2020 08:01 )  WBC Count : 6.76 K/uL  RBC Count : 3.55 M/uL  Hemoglobin : 10.9 g/dL  Hematocrit : 33.8 %  Platelet Count - Automated : 135 K/uL  Mean Cell Volume : 95.2 fl  Mean Cell Hemoglobin : 30.7 pg  Mean Cell Hemoglobin Concentration : 32.2 gm/dL  Auto Neutrophil # : x  Auto Lymphocyte # : x  Auto Monocyte # : x  Auto Eosinophil # : x  Auto Basophil # : x  Auto Neutrophil % : x  Auto Lymphocyte % : x  Auto Monocyte % : x  Auto Eosinophil % : x  Auto Basophil % : x    Labs: 10-17 Na136 mmol/L Glu 145 mg/dL<H> K+ 3.8 mmol/L Cr  2.17 mg/dL<H> BUN 89.0 mg/dL<H> Phos n/a   Alb n/a   PAB n/a       Skin: As per EMR, WDL except ecchymotic.     Nutrition focused physical exam previously conducted - found signs of malnutrition [ ]absent [ ]present    Subcutaneous fat loss: [X] Orbital fat pads region, [X]Buccal fat region, [ ]Triceps region,  [ ]Ribs region    Muscle wasting: [X]Temples region, [X]Clavicle region, [X]Shoulder region, [ ]Scapula region, [X]Interosseous region,  [ ]thigh region, [ ]Calf region    Estimated Needs:   [X] no change since previous assessment  [ ] recalculated:     Current Nutrition Diagnosis: Pt remains at nutrition risk secondary to malnutrition severe (chronic) related to inability to consume sufficient protein energy intake with poor appetite in setting of advanced age, hx cirrhosis and now s/p fall with SOB/pleural effusion  as evidenced by pt meeting <75% est energy intake >1 mo, severe muscle wasting/fat loss, wt loss. Attempts made, unable to interview pt at this time. Last BM 10/17 per documentation. Pt with 50-85% PO intake per flowsheet documentation. Encourage PO intake. RD to remain available.     Recommendations:   1. Continue diet as tolerated.  2. Recommend Glucerna BID to optimize PO intake and provide an additional 220kcal and 10g protein per serving.   3. Monitor weights and labs.     Monitoring and Evaluation:   [X] PO intake [X] Tolerance to diet prescription [X] Weights  [X] Follow up per protocol [X] Labs
Patient seen and examined at bedside. Agree with above H & P except pt does not have a hx of CHF or Afib as per cardio. Large right side pleural effusion with mediastinal shift liekly due to cirrhosis. Pt is s/p thoracentesis. Pt feels a lot better & now is able to breathe. No chest pain, palpitations, sob, light headedness/dizziness, difficulty breathing/cough, fevers/chills, abdominal pain, n/v, diarrhea/constipation, dysuria or increased urinary frequency. 98% on 3L NC. Does not use O2 at home. f/u pleural fluid analysis. c/w lasix IV & aldactone. Severe PCM- nutrition consult called      PHYSICAL EXAM-  GENERAL: NAD, cachetic looking elderly female  HEAD:  Atraumatic, Normocephalic  EYES: EOMI, PERRLA, conjunctiva and sclera clear  NECK: Supple, No JVD  NERVOUS SYSTEM:  Alert & Oriented X3, Motor Strength 5/5 B/L upper and lower extremities; DTRs 2+ intact and symmetric  CHEST/LUNG: Clear to auscultation bilaterally; No rales, rhonchi, wheezing, or rubs, +chest tube  HEART: Regular rate and rhythm; No murmurs, rubs, or gallops  ABDOMEN: Soft, Nontender, distended; Bowel sounds present  EXTREMITIES:  2+ Peripheral Pulses, 1+ pitting edema  SKIN: No rashes or lesions

## 2020-10-17 NOTE — PROGRESS NOTE ADULT - SUBJECTIVE AND OBJECTIVE BOX
Significant recent/past 24 hr events:  No acute overnight events. CT to H20 seal, No AL noted, 110cc drainage overnight.  Pt currently in NAD and without acute complaints. Denies N/V/D HA, dizziness, blurry vision, numbness/tingling, SOB, cough, chest pain, palpitations, abd pain or urinary sx's.       Subjective:  Review of Systems  ROS negative x 10 systems except as noted above    Patient is a 85y old  Female who presents with a chief complaint of right Pleural effusion (17 Oct 2020 14:06)    HPI:  85 year old F with PMH of HTN, CHF, cirrhosis with ascites, HLD, DM, AFib (no A/C) presented to ED s/p fall, reported as mechanical fall, tripped and fell, denies loss of consciousness or head injury. Patient stated she had worsening shortness of breath for the past 3 days. Patient was evaluated by her cardiologist Dr. Camp and pulmonologist Dr. Cotto on 08/2020, CXR outpatient which showed right sided pleural effusion and was recommended to get drained and was scheduled for follow up visit today 10/09. In the Ed pt was hypoxic, tachypneic and mild to moderate respiratory distress requiring 4L NC, CXR showed large pleural effusion, given Lasix 80mg IV push, evaluated by ct surgery for possible chest tube placement, pending Ct chest w/o contrast. Patient denies fevers/chills, dizziness, headache, chest pain, palpitation, cough, abd pain/n/v/d, urinary symptoms, recent travel or sick contact.      (09 Oct 2020 04:49)    PAST MEDICAL & SURGICAL HISTORY:  Ascites    Cirrhosis    DM (diabetes mellitus)    CHF (congestive heart failure)    HTN (hypertension)    S/P hernia repair      FAMILY HISTORY:  No family history of diabetes mellitus      Vitals   ICU Vital Signs Last 24 Hrs  T(C): 36.1 (17 Oct 2020 13:48), Max: 36.8 (17 Oct 2020 04:54)  T(F): 97 (17 Oct 2020 13:48), Max: 98.3 (17 Oct 2020 04:54)  HR: 55 (17 Oct 2020 13:48) (51 - 82)  BP: 132/67 (17 Oct 2020 13:48) (124/70 - 176/83)  BP(mean): --  ABP: --  ABP(mean): --  RR: 18 (17 Oct 2020 13:48) (18 - 18)  SpO2: 94% (17 Oct 2020 13:48) (94% - 99%)    I&O's Detail    16 Oct 2020 07:01  -  17 Oct 2020 07:00  --------------------------------------------------------  IN:  Total IN: 0 mL    OUT:    Chest Tube (mL): 630 mL  Total OUT: 630 mL    Total NET: -630 mL      17 Oct 2020 07:01  -  17 Oct 2020 15:47  --------------------------------------------------------  IN:  Total IN: 0 mL    OUT:    Chest Tube (mL): 110 mL  Total OUT: 110 mL    Total NET: -110 mL    LABS                        10.9   6.76  )-----------( 135      ( 17 Oct 2020 08:01 )             33.8     10-17    136  |  98  |  89.0<H>  ----------------------------<  145<H>  3.8   |  23.0  |  2.17<H>    Ca    8.7      17 Oct 2020 08:01    TPro  6.3<L>  /  Alb  3.5  /  TBili  0.7  /  DBili  x   /  AST  33<H>  /  ALT  15  /  AlkPhos  142<H>  10-16    LIVER FUNCTIONS - ( 16 Oct 2020 06:03 )  Alb: 3.5 g/dL / Pro: 6.3 g/dL / ALK PHOS: 142 U/L / ALT: 15 U/L / AST: 33 U/L / GGT: x           POCT Blood Glucose.: 318 mg/dL (10-17-20 @ 12:37)  POCT Blood Glucose.: 148 mg/dL (10-17-20 @ 08:17)  POCT Blood Glucose.: 183 mg/dL (10-16-20 @ 22:10)  POCT Blood Glucose.: 241 mg/dL (10-16-20 @ 17:58)      MEDICATIONS  (STANDING):  ATENolol  Tablet 50 milliGRAM(s) Oral two times a day  cloNIDine 0.2 milliGRAM(s) Oral two times a day  dextrose 5%. 1000 milliLiter(s) (50 mL/Hr) IV Continuous <Continuous>  dextrose 50% Injectable 12.5 Gram(s) IV Push once  dextrose 50% Injectable 25 Gram(s) IV Push once  dextrose 50% Injectable 25 Gram(s) IV Push once  furosemide    Tablet 40 milliGRAM(s) Oral daily  heparin   Injectable 5000 Unit(s) SubCutaneous every 12 hours  influenza   Vaccine 0.5 milliLiter(s) IntraMuscular once  insulin glargine Injectable (LANTUS) 16 Unit(s) SubCutaneous at bedtime  insulin lispro (HumaLOG) corrective regimen sliding scale   SubCutaneous three times a day before meals  lactulose Syrup 15 Gram(s) Oral two times a day  lidocaine   Patch 1 Patch Transdermal daily  predniSONE   Tablet 2.5 milliGRAM(s) Oral daily  sodium zirconium cyclosilicate 10 Gram(s) Oral daily  spironolactone 25 milliGRAM(s) Oral daily    MEDICATIONS  (PRN):  acetaminophen   Tablet .. 650 milliGRAM(s) Oral every 6 hours PRN Temp greater or equal to 38C (100.4F), Mild Pain (1 - 3)  dextrose 40% Gel 15 Gram(s) Oral once PRN Blood Glucose LESS THAN 70 milliGRAM(s)/deciliter  glucagon  Injectable 1 milliGRAM(s) IntraMuscular once PRN Glucose LESS THAN 70 milligrams/deciliter  hydrALAZINE Injectable 5 milliGRAM(s) IV Push every 6 hours PRN for sbp above 160 mmhg  oxycodone    5 mG/acetaminophen 325 mG 1 Tablet(s) Oral every 4 hours PRN Moderate Pain (4 - 6)      Allergies:  penicillin (Other)      Physical Exam:   Constitutional: NAD  Neck: supple,  No JVD. Trachea midline.  Respiratory: Breath Sounds equal & clear bilaterally to auscultation, no accessory muscle use noted  Cardiovascular: RRR, normal S1, S2; no murmurs or rub  Gastrointestinal: Soft, non-tender, non distended, normal bowel sounds  Extremities: BLANCAS x 4, no peripheral edema, no cyanosis, no clubbing   Vascular: Equal and normal pulses: 2+ peripheral pulses throughout  Neurological: A+O x 3; speech clear and intact; no gross sensory/motor deficits  Psychiatric: calm, normal mood, normal affect  Skin: warm, dry, well perfused, no rashes  CT; Rt CT to H20 seal, no AL. Insertion site D/I without d/c or bleeding.      Code Status: DNR/DNI      Critical care time spent: 35 minutes (reviewing chart including medication, labs and imaging results, discussions with interdisciplinary team, discussing goals of care/advanced directives, counseling patient and/or family, non-inclusive of procedures)    Case including assessment/plan of care discussed with attending.

## 2020-10-17 NOTE — PROGRESS NOTE ADULT - ASSESSMENT
CKD: stage III (baseline Screat 1.8)   h/o HTN, CHF, cirrhosis with ascites, HLD, AFib (no A/C)   DM poor control A1C 8.5==> 24 hr urine study= 2100 mg proteinuria  Large R pleural effusion s/p CT   No obstruction on renal sono  - avoid potential nephrotoxins  - cont Lasix and Aldactone  - monitor labs==> will need to maintain a degree of azotemia

## 2020-10-17 NOTE — PROGRESS NOTE ADULT - SUBJECTIVE AND OBJECTIVE BOX
BHAVIK BLANDON    [id number]    85y      Female    INTERVAL HPI/OVERNIGHT EVENTS:  patient being seen for effusions. patient seen at bedside and states feeling ok.     REVIEW OF SYSTEMS:    CONSTITUTIONAL: No fever, weight loss, or fatigue  RESPIRATORY: No cough, wheezing, hemoptysis; No shortness of breath  CARDIOVASCULAR: No chest pain, palpitations  GASTROINTESTINAL: No abdominal or epigastric pain. No nausea, vomiting  NEUROLOGICAL: No headaches, memory loss, loss of strength.  MISCELLANEOUS:      Vital Signs Last 24 Hrs  T(C): 36.1 (17 Oct 2020 13:48), Max: 36.8 (17 Oct 2020 04:54)  T(F): 97 (17 Oct 2020 13:48), Max: 98.3 (17 Oct 2020 04:54)  HR: 55 (17 Oct 2020 13:48) (51 - 82)  BP: 132/67 (17 Oct 2020 13:48) (124/70 - 176/83)  BP(mean): --  RR: 18 (17 Oct 2020 13:48) (18 - 18)  SpO2: 94% (17 Oct 2020 13:48) (94% - 99%)    PHYSICAL EXAM:    CONSTITUTIONAL: Elderly female, frail, in NAD, pleasant.   HEENT: NC/AT, PERRL, no JVD  RESPIRATORY: Decreased breath sounds R lung  CARDIOVASCULAR: RRR, S1/S2+, no m/g/r  ABDOMEN: Mildly distended abdomen, non-tender, BS+  MUSCLOSKELETAL: No edema, cyanosis, deformities  PSYCH: A+O to person, place, and time; affect appropriate  NEUROLOGY: CN 2-12 are intact and symmetric; no gross neurological deficits.  SKIN: No rashes; no palpable lesions  VASC: Distal pulses palpable    LABS:                        10.9   6.76  )-----------( 135      ( 17 Oct 2020 08:01 )             33.8     10-17    136  |  98  |  89.0<H>  ----------------------------<  145<H>  3.8   |  23.0  |  2.17<H>    Ca    8.7      17 Oct 2020 08:01    TPro  6.3<L>  /  Alb  3.5  /  TBili  0.7  /  DBili  x   /  AST  33<H>  /  ALT  15  /  AlkPhos  142<H>  10-16      MEDICATIONS  (STANDING):  ATENolol  Tablet 50 milliGRAM(s) Oral two times a day  cloNIDine 0.2 milliGRAM(s) Oral two times a day  dextrose 5%. 1000 milliLiter(s) (50 mL/Hr) IV Continuous <Continuous>  dextrose 50% Injectable 12.5 Gram(s) IV Push once  dextrose 50% Injectable 25 Gram(s) IV Push once  dextrose 50% Injectable 25 Gram(s) IV Push once  furosemide    Tablet 40 milliGRAM(s) Oral daily  heparin   Injectable 5000 Unit(s) SubCutaneous every 12 hours  influenza   Vaccine 0.5 milliLiter(s) IntraMuscular once  insulin glargine Injectable (LANTUS) 16 Unit(s) SubCutaneous at bedtime  insulin lispro (HumaLOG) corrective regimen sliding scale   SubCutaneous three times a day before meals  lactulose Syrup 15 Gram(s) Oral two times a day  lidocaine   Patch 1 Patch Transdermal daily  predniSONE   Tablet 2.5 milliGRAM(s) Oral daily  sodium zirconium cyclosilicate 10 Gram(s) Oral daily  spironolactone 25 milliGRAM(s) Oral daily    MEDICATIONS  (PRN):  acetaminophen   Tablet .. 650 milliGRAM(s) Oral every 6 hours PRN Temp greater or equal to 38C (100.4F), Mild Pain (1 - 3)  dextrose 40% Gel 15 Gram(s) Oral once PRN Blood Glucose LESS THAN 70 milliGRAM(s)/deciliter  glucagon  Injectable 1 milliGRAM(s) IntraMuscular once PRN Glucose LESS THAN 70 milligrams/deciliter  hydrALAZINE Injectable 5 milliGRAM(s) IV Push every 6 hours PRN for sbp above 160 mmhg  oxycodone    5 mG/acetaminophen 325 mG 1 Tablet(s) Oral every 4 hours PRN Moderate Pain (4 - 6)      RADIOLOGY & ADDITIONAL TESTS:

## 2020-10-17 NOTE — PROGRESS NOTE ADULT - PROBLEM SELECTOR PLAN 1
Maintain Rt CT at this time  Record drainage Qshift  Cough and deep breathing exercise, IS  Daily CXR  Not likey a candidate for pleurex for h/o cirrhosis  Likely plan for home hospice with chest tube in place.  Cont supportive care per primary team. Will cont to follow

## 2020-10-17 NOTE — PROGRESS NOTE ADULT - ASSESSMENT
85 year old F with PMH of HTN, CHF, cirrhosis with ascites, HLD, DM, AFib (no A/C) presented to ED s/p fall, reported as mechanical fall, tripped and fell, denies loss of consciousness or head injury. Patient stated she had worsening shortness of breath. Patient worked up and found to have large right pleural effusion associated with HF, cirrhosis  and DEVYN likely on CKD. Patient seen by multiple speciliaties and had ct placed in right lung and is now awaiting dc to either home with hospice or [facility] with eventual hospice    Acute hypoxic respiratory failure due to right pleural effusion, likely hepatic hydrothorax, improved. trapped lung  - CTS, Pulm following   - CT care per CTS. Removal on discharge to home hospice.   Volume overload 2/2 to cirrhosis, improved.   - Cardiology signed off. c.w lasix and aldactone  - S/p albumin, as per GI no need for paracentesis at this time due to small amt of ascites.   - Ammonia elevated -lactulose  DEVYN vs CKD, worsened. hyperkalemia  - Nephro following.  lokelma  HTN  -c.w meds  IDDM  - Lantus 16u qhs  - D/c Lispro 3u TIDAC with meals  Arthritis   -cont with prednisone   Severe protein calorie malnutrition  - Supplementation  DVT ppx   - Heparin sq    dnr dni   spoke to daughter today [phone] and would like to explore the option of discharging to [facility]

## 2020-10-18 LAB
GLUCOSE BLDC GLUCOMTR-MCNC: 151 MG/DL — HIGH (ref 70–99)
GLUCOSE BLDC GLUCOMTR-MCNC: 160 MG/DL — HIGH (ref 70–99)
GLUCOSE BLDC GLUCOMTR-MCNC: 219 MG/DL — HIGH (ref 70–99)
GLUCOSE BLDC GLUCOMTR-MCNC: 353 MG/DL — HIGH (ref 70–99)

## 2020-10-18 PROCEDURE — 99232 SBSQ HOSP IP/OBS MODERATE 35: CPT

## 2020-10-18 PROCEDURE — 71045 X-RAY EXAM CHEST 1 VIEW: CPT | Mod: 26

## 2020-10-18 RX ORDER — HYDRALAZINE HCL 50 MG
25 TABLET ORAL THREE TIMES A DAY
Refills: 0 | Status: DISCONTINUED | OUTPATIENT
Start: 2020-10-18 | End: 2020-10-20

## 2020-10-18 RX ORDER — ATENOLOL 25 MG/1
25 TABLET ORAL DAILY
Refills: 0 | Status: DISCONTINUED | OUTPATIENT
Start: 2020-10-18 | End: 2020-10-20

## 2020-10-18 RX ADMIN — HEPARIN SODIUM 5000 UNIT(S): 5000 INJECTION INTRAVENOUS; SUBCUTANEOUS at 10:19

## 2020-10-18 RX ADMIN — Medication 650 MILLIGRAM(S): at 18:07

## 2020-10-18 RX ADMIN — Medication 1: at 08:18

## 2020-10-18 RX ADMIN — LIDOCAINE 1 PATCH: 4 CREAM TOPICAL at 00:20

## 2020-10-18 RX ADMIN — LIDOCAINE 1 PATCH: 4 CREAM TOPICAL at 12:17

## 2020-10-18 RX ADMIN — Medication 5: at 12:15

## 2020-10-18 RX ADMIN — Medication 25 MILLIGRAM(S): at 23:00

## 2020-10-18 RX ADMIN — INSULIN GLARGINE 16 UNIT(S): 100 INJECTION, SOLUTION SUBCUTANEOUS at 22:59

## 2020-10-18 RX ADMIN — Medication 40 MILLIGRAM(S): at 06:14

## 2020-10-18 RX ADMIN — HEPARIN SODIUM 5000 UNIT(S): 5000 INJECTION INTRAVENOUS; SUBCUTANEOUS at 23:00

## 2020-10-18 RX ADMIN — Medication 2.5 MILLIGRAM(S): at 06:06

## 2020-10-18 RX ADMIN — Medication 0.2 MILLIGRAM(S): at 06:05

## 2020-10-18 RX ADMIN — Medication 2: at 17:17

## 2020-10-18 RX ADMIN — SPIRONOLACTONE 25 MILLIGRAM(S): 25 TABLET, FILM COATED ORAL at 06:05

## 2020-10-18 NOTE — PROGRESS NOTE ADULT - SUBJECTIVE AND OBJECTIVE BOX
BHAVIK BLANDON    [id number]    85y      Female    INTERVAL HPI/OVERNIGHT EVENTS: patient being seen for effusions. patient seen at bedside and states feeling ok.     REVIEW OF SYSTEMS:    CONSTITUTIONAL: No fever, weight loss, or fatigue  RESPIRATORY: No cough, wheezing, hemoptysis; No shortness of breath  CARDIOVASCULAR: No chest pain, palpitations  GASTROINTESTINAL: No abdominal or epigastric pain. No nausea, vomiting  NEUROLOGICAL: No headaches, memory loss, loss of strength.  MISCELLANEOUS:      Vital Signs Last 24 Hrs  T(C): 36.4 (18 Oct 2020 08:51), Max: 36.4 (17 Oct 2020 23:30)  T(F): 97.5 (18 Oct 2020 08:51), Max: 97.5 (17 Oct 2020 23:30)  HR: 49 (18 Oct 2020 08:51) (49 - 58)  BP: 104/56 (18 Oct 2020 08:51) (104/56 - 169/63)  BP(mean): --  RR: 16 (18 Oct 2020 08:51) (16 - 20)  SpO2: 100% (18 Oct 2020 08:51) (94% - 100%)    PHYSICAL EXAM:    CONSTITUTIONAL: Elderly female, frail, in NAD, pleasant.   HEENT: NC/AT, PERRL, no JVD  RESPIRATORY: Decreased breath sounds R lung  CARDIOVASCULAR:  s1s2+  ABDOMEN: Mildly distended abdomen, non-tender, BS+  MUSCLOSKELETAL: No edema, cyanosis, deformities  PSYCH: A+O to person, place, and time; affect appropriate  NEUROLOGY: CN 2-12 are intact and symmetric; no gross neurological deficits.  SKIN: No rashes; no palpable lesions  VASC: Distal pulses palpable    LABS:                        10.9   6.76  )-----------( 135      ( 17 Oct 2020 08:01 )             33.8     10-17    136  |  98  |  89.0<H>  ----------------------------<  145<H>  3.8   |  23.0  |  2.17<H>    Ca    8.7      17 Oct 2020 08:01        MEDICATIONS  (STANDING):  ATENolol  Tablet 25 milliGRAM(s) Oral daily  dextrose 5%. 1000 milliLiter(s) (50 mL/Hr) IV Continuous <Continuous>  dextrose 50% Injectable 12.5 Gram(s) IV Push once  dextrose 50% Injectable 25 Gram(s) IV Push once  dextrose 50% Injectable 25 Gram(s) IV Push once  furosemide    Tablet 40 milliGRAM(s) Oral daily  heparin   Injectable 5000 Unit(s) SubCutaneous every 12 hours  hydrALAZINE 25 milliGRAM(s) Oral three times a day  influenza   Vaccine 0.5 milliLiter(s) IntraMuscular once  insulin glargine Injectable (LANTUS) 16 Unit(s) SubCutaneous at bedtime  insulin lispro (HumaLOG) corrective regimen sliding scale   SubCutaneous three times a day before meals  lactulose Syrup 15 Gram(s) Oral two times a day  lidocaine   Patch 1 Patch Transdermal daily  predniSONE   Tablet 2.5 milliGRAM(s) Oral daily  spironolactone 25 milliGRAM(s) Oral daily    MEDICATIONS  (PRN):  acetaminophen   Tablet .. 650 milliGRAM(s) Oral every 6 hours PRN Temp greater or equal to 38C (100.4F), Mild Pain (1 - 3)  dextrose 40% Gel 15 Gram(s) Oral once PRN Blood Glucose LESS THAN 70 milliGRAM(s)/deciliter  glucagon  Injectable 1 milliGRAM(s) IntraMuscular once PRN Glucose LESS THAN 70 milligrams/deciliter  hydrALAZINE Injectable 5 milliGRAM(s) IV Push every 6 hours PRN for sbp above 160 mmhg  oxycodone    5 mG/acetaminophen 325 mG 1 Tablet(s) Oral every 4 hours PRN Moderate Pain (4 - 6)      RADIOLOGY & ADDITIONAL TESTS:

## 2020-10-18 NOTE — PROGRESS NOTE ADULT - PROBLEM SELECTOR PLAN 1
Maintain Rt CT at this time  Record drainage Qshift  Cough and deep breathing exercise, IS  Daily CXR  Not likey a candidate for pleurex for h/o cirrhosis  Likely plan for home hospice with chest tube in place.   Will cont to follow  Discussed with Dr. Best in Am rounds.

## 2020-10-18 NOTE — PROGRESS NOTE ADULT - SUBJECTIVE AND OBJECTIVE BOX
Subjective: Pt lying in bed.  NAD noted.    Vital Signs:  Vital Signs Last 24 Hrs  T(C): 36.4 (10-18-20 @ 08:51), Max: 36.4 (10-17-20 @ 23:30)  T(F): 97.5 (10-18-20 @ 08:51), Max: 97.5 (10-17-20 @ 23:30)  HR: 49 (10-18-20 @ 08:51) (49 - 58)  BP: 104/56 (10-18-20 @ 08:51) (104/56 - 169/63)  RR: 16 (10-18-20 @ 08:51) (16 - 20)  SpO2: 100% (10-18-20 @ 08:51) (94% - 100%)       Neurology: A&Ox3, nonfocal.  Respiratory: Diminished  CV: RRR, S1S2.  Abdominal: Soft, NT, ND +BS.  CT site without crepitus and no airleak noted.        10-17 @ 07:01  -  10-18 @ 07:00  --------------------------------------------------------  IN:  Total IN: 0 mL    OUT:    Chest Tube (mL): 310 mL    Estimated Blood Loss (mL): 0 mL    Voided (mL): 250 mL  Total OUT: 560 mL    Total NET: -560 mL

## 2020-10-18 NOTE — PROGRESS NOTE ADULT - ASSESSMENT
CKD: stage III (baseline Screat 1.8)   h/o HTN, CHF, cirrhosis with ascites, HLD, AFib  DM ==> 24 hr urine= 2100 mg proteinuria  Large R pleural effusion s/p CT   No obstruction on renal sono  - avoid potential nephrotoxins  - cont Lasix and Aldactone  - monitor labs==> keep azotemic  - Elevated BUN also due to steroid effect

## 2020-10-18 NOTE — PROGRESS NOTE ADULT - ASSESSMENT
85 year old F with PMH of HTN, CHF, cirrhosis with ascites, HLD, DM, AFib (no A/C) presented to ED s/p fall, reported as mechanical fall, tripped and fell, denies loss of consciousness or head injury. Patient stated she had worsening shortness of breath. Patient worked up and found to have large right pleural effusion associated with HF, cirrhosis  and DEVYN likely on CKD. Patient seen by multiple speciliaties and had ct placed in right lung     Acute hypoxic respiratory failure due to right pleural effusion, likely hepatic hydrothorax, improved. trapped lung  - CTS, Pulm following   - CT care per CTS.   Volume overload 2/2 to cirrhosis, improved.   - Cardiology signed off. c.w lasix and aldactone  - S/p albumin, as per GI no need for paracentesis at this time due to small amt of ascites.   - Ammonia elevated -lactulose    asymptomatic bradycardia - decreased atenolol  -> dc clonidine, add hydralazine for bp   EDVYN vs CKD,=cr stable   - Nephro following.  HTN  cut back on atenolol and dc clonidine   IDDM  - Lantus 16u qhs  Arthritis   -cont with prednisone   Severe protein calorie malnutrition  - Supplementation  DVT ppx   - Heparin sq    dnr dni   daughter [phone] wants [place] to [place] , hopefully tomorrow

## 2020-10-18 NOTE — PROGRESS NOTE ADULT - SUBJECTIVE AND OBJECTIVE BOX
NEPHROLOGY INTERVAL HPI/OVERNIGHT EVENTS:  pt resting when seen earlier  no acute complaints    MEDICATIONS  (STANDING):  ATENolol  Tablet 25 milliGRAM(s) Oral daily  cloNIDine 0.1 milliGRAM(s) Oral two times a day  dextrose 5%. 1000 milliLiter(s) (50 mL/Hr) IV Continuous <Continuous>  dextrose 50% Injectable 12.5 Gram(s) IV Push once  dextrose 50% Injectable 25 Gram(s) IV Push once  dextrose 50% Injectable 25 Gram(s) IV Push once  furosemide    Tablet 40 milliGRAM(s) Oral daily  heparin   Injectable 5000 Unit(s) SubCutaneous every 12 hours  hydrALAZINE 25 milliGRAM(s) Oral three times a day  influenza   Vaccine 0.5 milliLiter(s) IntraMuscular once  insulin glargine Injectable (LANTUS) 16 Unit(s) SubCutaneous at bedtime  insulin lispro (HumaLOG) corrective regimen sliding scale   SubCutaneous three times a day before meals  lactulose Syrup 15 Gram(s) Oral two times a day  lidocaine   Patch 1 Patch Transdermal daily  predniSONE   Tablet 2.5 milliGRAM(s) Oral daily  spironolactone 25 milliGRAM(s) Oral daily    MEDICATIONS  (PRN):  acetaminophen   Tablet .. 650 milliGRAM(s) Oral every 6 hours PRN Temp greater or equal to 38C (100.4F), Mild Pain (1 - 3)  dextrose 40% Gel 15 Gram(s) Oral once PRN Blood Glucose LESS THAN 70 milliGRAM(s)/deciliter  glucagon  Injectable 1 milliGRAM(s) IntraMuscular once PRN Glucose LESS THAN 70 milligrams/deciliter  hydrALAZINE Injectable 5 milliGRAM(s) IV Push every 6 hours PRN for sbp above 160 mmhg  oxycodone    5 mG/acetaminophen 325 mG 1 Tablet(s) Oral every 4 hours PRN Moderate Pain (4 - 6)      Allergies    penicillin (Other)          Vital Signs Last 24 Hrs  T(C): 36.4 (18 Oct 2020 08:51), Max: 36.4 (17 Oct 2020 10:00)  T(F): 97.5 (18 Oct 2020 08:51), Max: 97.5 (17 Oct 2020 10:00)  HR: 49 (18 Oct 2020 08:51) (49 - 59)  BP: 104/56 (18 Oct 2020 08:51) (104/56 - 169/63)  BP(mean): --  RR: 16 (18 Oct 2020 08:51) (16 - 20)  SpO2: 100% (18 Oct 2020 08:51) (94% - 100%)    PHYSICAL EXAM:  GENERAL: Fatigued  NECK: Supple, no JVD  NERVOUS SYSTEM:  Alert & Oriented X3; no asterixis  CHEST/LUNG: Diminished BS at bases; R CT  HEART: Regular rate and rhythm; No rub  ABDOMEN: Soft, Nontender, Nondistended; BS+  EXTREMITIES: + improved LE edema    LABS:                        10.9   6.76  )-----------( 135      ( 17 Oct 2020 08:01 )             33.8     10-17    136  |  98  |  89.0<H>  ----------------------------<  145<H>  3.8   |  23.0  |  2.17<H>        Ca    8.7      17 Oct 2020 08:01              RADIOLOGY & ADDITIONAL TESTS:

## 2020-10-19 LAB
GLUCOSE BLDC GLUCOMTR-MCNC: 179 MG/DL — HIGH (ref 70–99)
GLUCOSE BLDC GLUCOMTR-MCNC: 220 MG/DL — HIGH (ref 70–99)
GLUCOSE BLDC GLUCOMTR-MCNC: 237 MG/DL — HIGH (ref 70–99)
GLUCOSE BLDC GLUCOMTR-MCNC: 245 MG/DL — HIGH (ref 70–99)
SARS-COV-2 RNA SPEC QL NAA+PROBE: SIGNIFICANT CHANGE UP

## 2020-10-19 PROCEDURE — 71045 X-RAY EXAM CHEST 1 VIEW: CPT | Mod: 26

## 2020-10-19 PROCEDURE — 99232 SBSQ HOSP IP/OBS MODERATE 35: CPT

## 2020-10-19 RX ORDER — LACTULOSE 10 G/15ML
22.5 SOLUTION ORAL
Qty: 0 | Refills: 0 | DISCHARGE
Start: 2020-10-19

## 2020-10-19 RX ORDER — LIDOCAINE 4 G/100G
1 CREAM TOPICAL
Qty: 0 | Refills: 0 | DISCHARGE
Start: 2020-10-19

## 2020-10-19 RX ORDER — HYDRALAZINE HCL 50 MG
1 TABLET ORAL
Qty: 0 | Refills: 0 | DISCHARGE
Start: 2020-10-19

## 2020-10-19 RX ORDER — FUROSEMIDE 40 MG
1 TABLET ORAL
Qty: 0 | Refills: 0 | DISCHARGE

## 2020-10-19 RX ORDER — INSULIN DETEMIR 100/ML (3)
10 INSULIN PEN (ML) SUBCUTANEOUS
Qty: 0 | Refills: 0 | DISCHARGE

## 2020-10-19 RX ORDER — SPIRONOLACTONE 25 MG/1
1 TABLET, FILM COATED ORAL
Qty: 0 | Refills: 0 | DISCHARGE

## 2020-10-19 RX ORDER — ATENOLOL 25 MG/1
1 TABLET ORAL
Qty: 0 | Refills: 0 | DISCHARGE
Start: 2020-10-19

## 2020-10-19 RX ORDER — ATENOLOL 25 MG/1
1 TABLET ORAL
Qty: 0 | Refills: 0 | DISCHARGE

## 2020-10-19 RX ORDER — SPIRONOLACTONE 25 MG/1
1 TABLET, FILM COATED ORAL
Qty: 0 | Refills: 0 | DISCHARGE
Start: 2020-10-19

## 2020-10-19 RX ORDER — INSULIN DETEMIR 100/ML (3)
12 INSULIN PEN (ML) SUBCUTANEOUS
Qty: 0 | Refills: 0 | DISCHARGE

## 2020-10-19 RX ORDER — FUROSEMIDE 40 MG
1 TABLET ORAL
Qty: 0 | Refills: 0 | DISCHARGE
Start: 2020-10-19

## 2020-10-19 RX ADMIN — INSULIN GLARGINE 16 UNIT(S): 100 INJECTION, SOLUTION SUBCUTANEOUS at 22:06

## 2020-10-19 RX ADMIN — Medication 2: at 16:40

## 2020-10-19 RX ADMIN — Medication 2: at 11:48

## 2020-10-19 RX ADMIN — SPIRONOLACTONE 25 MILLIGRAM(S): 25 TABLET, FILM COATED ORAL at 06:34

## 2020-10-19 RX ADMIN — Medication 25 MILLIGRAM(S): at 13:39

## 2020-10-19 RX ADMIN — Medication 2: at 07:54

## 2020-10-19 RX ADMIN — Medication 650 MILLIGRAM(S): at 11:51

## 2020-10-19 RX ADMIN — Medication 25 MILLIGRAM(S): at 06:34

## 2020-10-19 RX ADMIN — Medication 25 MILLIGRAM(S): at 22:07

## 2020-10-19 RX ADMIN — LACTULOSE 15 GRAM(S): 10 SOLUTION ORAL at 06:36

## 2020-10-19 RX ADMIN — HEPARIN SODIUM 5000 UNIT(S): 5000 INJECTION INTRAVENOUS; SUBCUTANEOUS at 22:07

## 2020-10-19 RX ADMIN — Medication 40 MILLIGRAM(S): at 06:35

## 2020-10-19 RX ADMIN — Medication 2.5 MILLIGRAM(S): at 06:35

## 2020-10-19 RX ADMIN — HEPARIN SODIUM 5000 UNIT(S): 5000 INJECTION INTRAVENOUS; SUBCUTANEOUS at 11:45

## 2020-10-19 RX ADMIN — Medication 650 MILLIGRAM(S): at 13:00

## 2020-10-19 RX ADMIN — ATENOLOL 25 MILLIGRAM(S): 25 TABLET ORAL at 11:45

## 2020-10-19 NOTE — PROGRESS NOTE ADULT - SUBJECTIVE AND OBJECTIVE BOX
NEPHROLOGY INTERVAL HPI/OVERNIGHT EVENTS:  pt remains comfortable  no acute distress noted    MEDICATIONS  (STANDING):  ATENolol  Tablet 25 milliGRAM(s) Oral daily  dextrose 5%. 1000 milliLiter(s) (50 mL/Hr) IV Continuous <Continuous>  dextrose 50% Injectable 12.5 Gram(s) IV Push once  dextrose 50% Injectable 25 Gram(s) IV Push once  dextrose 50% Injectable 25 Gram(s) IV Push once  furosemide    Tablet 40 milliGRAM(s) Oral daily  heparin   Injectable 5000 Unit(s) SubCutaneous every 12 hours  hydrALAZINE 25 milliGRAM(s) Oral three times a day  influenza   Vaccine 0.5 milliLiter(s) IntraMuscular once  insulin glargine Injectable (LANTUS) 16 Unit(s) SubCutaneous at bedtime  insulin lispro (HumaLOG) corrective regimen sliding scale   SubCutaneous three times a day before meals  lactulose Syrup 15 Gram(s) Oral two times a day  lidocaine   Patch 1 Patch Transdermal daily  predniSONE   Tablet 2.5 milliGRAM(s) Oral daily  spironolactone 25 milliGRAM(s) Oral daily    MEDICATIONS  (PRN):  acetaminophen   Tablet .. 650 milliGRAM(s) Oral every 6 hours PRN Temp greater or equal to 38C (100.4F), Mild Pain (1 - 3)  dextrose 40% Gel 15 Gram(s) Oral once PRN Blood Glucose LESS THAN 70 milliGRAM(s)/deciliter  glucagon  Injectable 1 milliGRAM(s) IntraMuscular once PRN Glucose LESS THAN 70 milligrams/deciliter  hydrALAZINE Injectable 5 milliGRAM(s) IV Push every 6 hours PRN for sbp above 160 mmhg  oxycodone    5 mG/acetaminophen 325 mG 1 Tablet(s) Oral every 4 hours PRN Moderate Pain (4 - 6)      Allergies    penicillin (Other)        Vital Signs Last 24 Hrs  T(C): 36.8 (19 Oct 2020 08:35), Max: 36.8 (19 Oct 2020 08:35)  T(F): 98.2 (19 Oct 2020 08:35), Max: 98.2 (19 Oct 2020 08:35)  HR: 61 (19 Oct 2020 08:35) (50 - 66)  BP: 100/78 (19 Oct 2020 08:35) (100/78 - 156/79)  BP(mean): --  RR: 18 (19 Oct 2020 08:35) (18 - 19)  SpO2: 99% (19 Oct 2020 08:35) (98% - 99%)    PHYSICAL EXAM:  GENERAL: Weak  NECK: Supple, no JVD  NERVOUS SYSTEM:  Alert & Oriented X3; no asterixis  CHEST/LUNG: Diminished BS at bases; R CT  HEART: Regular rate and rhythm; No rub  ABDOMEN: Soft, Nontender, Nondistended; BS+  EXTREMITIES: + less LE edema    LABS:      Basic Metabolic Panel in AM (10.17.20 @ 08:01)   Creatinine, Serum: 2.17 mg/dL             RADIOLOGY & ADDITIONAL TESTS:

## 2020-10-19 NOTE — PROGRESS NOTE ADULT - ASSESSMENT
85 yr woman hx of cirrhosis, CHF, afib admitted after a mechanical fall  with sx of SOB found to have large right pleural effusion associated with HF, cirrhosis  and DEVYN likely on CKD     Problem/Recommendation - 1:  Problem: Pleural effusion. 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   cytology negative  CTS following - likely not a candidate for Pleurex  Oxycodone renewed for pain       Problem/Recommendation - 2:  ·  Problem: Cirrhosis.  Recommendation: with Ascites  no planned paracentesis       Problem/Recommendation - 3:  ·  Problem: DEVYN (acute kidney injury).  Recommendation: Renal following.  cont medical optimization  patient states would NOT want dialysis.      Problem/Recommendation - 4:  ·  Problem: CHF (congestive heart failure).  Recommendation:   cont Spironolactone and Lasix.      Problem/Recommendation - 5:  ·  Problem: Encounter for palliative care.  Recommendation:  Discussed with patient issue with CT with drainage. Per CTS she is not a candidate to have Pleurex. Hospice CANNOT manage chest tubes at home.  Plan for [facility]?  If patient goes to [facility] and eventually has CT removed, she can always have hospice services when she gets home.

## 2020-10-19 NOTE — PROGRESS NOTE ADULT - SUBJECTIVE AND OBJECTIVE BOX
Pt is an 86yo Female admitted to [facility] for Rt Pleural Effusion. Pt seen and examined at bedside today by Hospitalist and PA. No acute overnight events to report. Pt has no complaints at this time. Denies SOB, CP, HA, cough, NVD, fever, chills, changes in urinary or bowel habits, changes in appetite. All other remaining ROS negative.     ROS: see above    Allergies    penicillin (Other)    Intolerances    MEDICATIONS  (STANDING):  ATENolol  Tablet 25 milliGRAM(s) Oral daily  dextrose 5%. 1000 milliLiter(s) (50 mL/Hr) IV Continuous <Continuous>  dextrose 50% Injectable 12.5 Gram(s) IV Push once  dextrose 50% Injectable 25 Gram(s) IV Push once  dextrose 50% Injectable 25 Gram(s) IV Push once  furosemide    Tablet 40 milliGRAM(s) Oral daily  heparin   Injectable 5000 Unit(s) SubCutaneous every 12 hours  hydrALAZINE 25 milliGRAM(s) Oral three times a day  influenza   Vaccine 0.5 milliLiter(s) IntraMuscular once  insulin glargine Injectable (LANTUS) 16 Unit(s) SubCutaneous at bedtime  insulin lispro (HumaLOG) corrective regimen sliding scale   SubCutaneous three times a day before meals  lactulose Syrup 15 Gram(s) Oral two times a day  lidocaine   Patch 1 Patch Transdermal daily  predniSONE   Tablet 2.5 milliGRAM(s) Oral daily  spironolactone 25 milliGRAM(s) Oral daily    MEDICATIONS  (PRN):  acetaminophen   Tablet .. 650 milliGRAM(s) Oral every 6 hours PRN Temp greater or equal to 38C (100.4F), Mild Pain (1 - 3)  dextrose 40% Gel 15 Gram(s) Oral once PRN Blood Glucose LESS THAN 70 milliGRAM(s)/deciliter  glucagon  Injectable 1 milliGRAM(s) IntraMuscular once PRN Glucose LESS THAN 70 milligrams/deciliter  hydrALAZINE Injectable 5 milliGRAM(s) IV Push every 6 hours PRN for sbp above 160 mmhg  oxycodone    5 mG/acetaminophen 325 mG 1 Tablet(s) Oral every 4 hours PRN Moderate Pain (4 - 6)    PHYSICAL EXAM:  GENERAL: NAD, lying in bed comfortably  HEAD:  Atraumatic, Normocephalic  EYES: EOMI, PERRL, conjunctiva and sclera clear  ENT: Moist mucous membranes  NECK: Supple, No JVD  CHEST/LUNG: Clear to auscultation bilaterally; No rales, rhonchi, wheezing, or rubs. Unlabored respirations  HEART: Regular rate and rhythm; No murmurs, rubs, or gallops  ABDOMEN: Bowel sounds present; Soft, Nontender, Nondistended   EXTREMITIES:  2+ Peripheral Pulses, brisk capillary refill. No clubbing, cyanosis, or edema  NERVOUS SYSTEM:  Alert & Oriented X3, speech clear. No facial droop, tongue protrusion midline. Answers questions appropriately. Full and equal 5/5 strength B/L upper and lower extremities. +reflexes B/L LE. Sensation intact. No deficits   MSK: FROM x 4 extremities   SKIN: No rashes or lesions     Pt is an 86yo Female admitted to [facility] for Rt Pleural Effusion. Pt seen and examined at bedside today by Hospitalist and PA. No acute overnight events to report. Pt has no complaints at this time. Denies SOB, CP, HA, cough, NVD, fever, chills, changes in urinary or bowel habits, changes in appetite. All other remaining ROS negative.     ROS: see above    Allergies    penicillin (Other)    Intolerances    MEDICATIONS  (STANDING):  ATENolol  Tablet 25 milliGRAM(s) Oral daily  dextrose 5%. 1000 milliLiter(s) (50 mL/Hr) IV Continuous <Continuous>  dextrose 50% Injectable 12.5 Gram(s) IV Push once  dextrose 50% Injectable 25 Gram(s) IV Push once  dextrose 50% Injectable 25 Gram(s) IV Push once  furosemide    Tablet 40 milliGRAM(s) Oral daily  heparin   Injectable 5000 Unit(s) SubCutaneous every 12 hours  hydrALAZINE 25 milliGRAM(s) Oral three times a day  influenza   Vaccine 0.5 milliLiter(s) IntraMuscular once  insulin glargine Injectable (LANTUS) 16 Unit(s) SubCutaneous at bedtime  insulin lispro (HumaLOG) corrective regimen sliding scale   SubCutaneous three times a day before meals  lactulose Syrup 15 Gram(s) Oral two times a day  lidocaine   Patch 1 Patch Transdermal daily  predniSONE   Tablet 2.5 milliGRAM(s) Oral daily  spironolactone 25 milliGRAM(s) Oral daily    MEDICATIONS  (PRN):  acetaminophen   Tablet .. 650 milliGRAM(s) Oral every 6 hours PRN Temp greater or equal to 38C (100.4F), Mild Pain (1 - 3)  dextrose 40% Gel 15 Gram(s) Oral once PRN Blood Glucose LESS THAN 70 milliGRAM(s)/deciliter  glucagon  Injectable 1 milliGRAM(s) IntraMuscular once PRN Glucose LESS THAN 70 milligrams/deciliter  hydrALAZINE Injectable 5 milliGRAM(s) IV Push every 6 hours PRN for sbp above 160 mmhg  oxycodone    5 mG/acetaminophen 325 mG 1 Tablet(s) Oral every 4 hours PRN Moderate Pain (4 - 6)    Vital Signs Last 24 Hrs  T(C): 36.8 (19 Oct 2020 08:35), Max: 36.8 (19 Oct 2020 08:35)  T(F): 98.2 (19 Oct 2020 08:35), Max: 98.2 (19 Oct 2020 08:35)  HR: 61 (19 Oct 2020 08:35) (50 - 66)  BP: 100/78 (19 Oct 2020 08:35) (100/78 - 156/79)  BP(mean): --  ABP: --  ABP(mean): --  RR: 18 (19 Oct 2020 08:35) (18 - 19)  SpO2: 99% (19 Oct 2020 08:35) (98% - 99%)    PHYSICAL EXAM:  GENERAL: NAD, lying in bed comfortably  HEAD:  Atraumatic, Normocephalic  EENT: conjunctiva and sclera clear, moist mucous membranes, trachea midline, nares patent  NECK: Supple, No JVD  CHEST/LUNG: CTABL; No rales, rhonchi, wheezing, or rubs. Unlabored respirations. Rt CT noted, draining and functioning appropriately.   HEART:  RRR; No murmurs, rubs, or gallops  ABDOMEN: +BS present in all 4 quadrants; Soft, NTTP, ND  EXTREMITIES:  2+ Peripheral Pulses, brisk capillary refill. No clubbing, cyanosis, or edema  NERVOUS SYSTEM:  A&OX3, speech clear. No facial droop, tongue protrusion midline. Answers questions appropriately. No deficits noted  MSK: FROM x 4 extremities   SKIN: No rashes or lesions    < from: Xray Chest 1 View- PORTABLE-Routine (Xray Chest 1 View- PORTABLE-Routine in AM.) (10.18.20 @ 11:12) >   EXAM:  XR CHEST PORTABLE ROUTINE 1V                          PROCEDURE DATE:  10/18/2020      FINDINGS:    Right pigtail catheter in place. Heart is normal in size. No focal consolidation. Hemidiaphragms unremarkable. Degenerative changes.      IMPRESSION:    Right pigtail catheter in place. No focal consolidation.

## 2020-10-19 NOTE — PROGRESS NOTE ADULT - ASSESSMENT
86yo Female w/PMHx of HTN, CHF, cirrhosis with ascites, HLD, DM, AFib (no A/C) presented to ED s/p fall, reported as mechanical fall, tripped and fell, denies loss of consciousness or head injury. Pt stated she had worsening SOB and found to have large Rt pleural effusion associated w/HF, cirrhosis  and DEVYN likely on CKD. Pt seen by multiple speciliaties, had CT placed in Rt lung. Pt is now medically stable and ready for D/C.    1. Acute hypoxic respiratory failure 2/2 Rt pleural effusion-likely hepatic hydrothorax  - CTS following, recs appreciated- maintain CT to water seal, daily CXR, record output per shift, Remove CT upon confirmation of D/C, likely tomorrow  - Pulm signed off    2. Volume overload 2/2 to cirrhosis  - improved.   - Cardiology signed off  - C/w  Lasix andAaldactone  - S/p albumin, as per GI no need for paracentesis at this time due to small amt of ascites.   - Ammonia elevated- c/w Lactulose    3. Asx Bradycardia   - C/w Atenolol 25mg PO TID  - C/w hydralazine for BP control     4. DEVYN vs CKD  - Cr stable   - Nephro following, recs appreciated- CKD: stage III (baseline Scr 1.8), 24 hr urine= 2100 mg proteinuria, Elevated BUN also due to steroid effect, C/w Lasix and Aldactone- will need to be kept azotemic  - No obstruction on renal sono  - Avoid potential nephrotoxins    5. HTN  - C/w Atenolol 25mg PO TID    6. IDDM  - Lantus 16U QHS    7. Arthritis   - C/w Prednisone     8.Severe protein calorie malnutrition  - Supplementation    DVT PPx : Heparin SQ  DNR/I    Dispo: Pt to be D/C'd to [facility] today or tomorrow, CT to be removed prior to D/C

## 2020-10-19 NOTE — PROGRESS NOTE ADULT - SUBJECTIVE AND OBJECTIVE BOX
CC:  follow up GOC  INTERVAL HPI/OVERNIGHT EVENTS:    PRESENT SYMPTOMS: SOURCE:  Patient/Family/Team    PAIN SCALE:  0 = none  1 = mild   2 = moderate  3 = severe    Pain: 2    Dyspnea:  [ ] YES [x ] NO  Anxiety:  [x ] YES [ ] NO  Fatigue: [ x] YES [ ] NO  Nausea: [ ] YES [x ] NO  Loss of Appetite: [ x] YES [ ] NO  Other symptoms: __________    MEDICATIONS  (STANDING):  ATENolol  Tablet 25 milliGRAM(s) Oral daily  dextrose 5%. 1000 milliLiter(s) (50 mL/Hr) IV Continuous <Continuous>  dextrose 50% Injectable 12.5 Gram(s) IV Push once  dextrose 50% Injectable 25 Gram(s) IV Push once  dextrose 50% Injectable 25 Gram(s) IV Push once  furosemide    Tablet 40 milliGRAM(s) Oral daily  heparin   Injectable 5000 Unit(s) SubCutaneous every 12 hours  hydrALAZINE 25 milliGRAM(s) Oral three times a day  influenza   Vaccine 0.5 milliLiter(s) IntraMuscular once  insulin glargine Injectable (LANTUS) 16 Unit(s) SubCutaneous at bedtime  insulin lispro (HumaLOG) corrective regimen sliding scale   SubCutaneous three times a day before meals  lactulose Syrup 15 Gram(s) Oral two times a day  lidocaine   Patch 1 Patch Transdermal daily  predniSONE   Tablet 2.5 milliGRAM(s) Oral daily  spironolactone 25 milliGRAM(s) Oral daily    MEDICATIONS  (PRN):  acetaminophen   Tablet .. 650 milliGRAM(s) Oral every 6 hours PRN Temp greater or equal to 38C (100.4F), Mild Pain (1 - 3)  dextrose 40% Gel 15 Gram(s) Oral once PRN Blood Glucose LESS THAN 70 milliGRAM(s)/deciliter  glucagon  Injectable 1 milliGRAM(s) IntraMuscular once PRN Glucose LESS THAN 70 milligrams/deciliter  hydrALAZINE Injectable 5 milliGRAM(s) IV Push every 6 hours PRN for sbp above 160 mmhg  oxycodone    5 mG/acetaminophen 325 mG 1 Tablet(s) Oral every 4 hours PRN Moderate Pain (4 - 6)      Allergies    penicillin (Other)    Intolerances    Karnofsky Performance Score/Palliative Performance Status Version 2:  40       %    Vital Signs Last 24 Hrs  T(C): 36.8 (19 Oct 2020 08:35), Max: 36.8 (19 Oct 2020 08:35)  T(F): 98.2 (19 Oct 2020 08:35), Max: 98.2 (19 Oct 2020 08:35)  HR: 67 (19 Oct 2020 11:46) (50 - 67)  BP: 136/68 (19 Oct 2020 11:46) (100/78 - 156/79)  BP(mean): --  RR: 18 (19 Oct 2020 11:46) (18 - 19)  SpO2: 98% (19 Oct 2020 11:46) (98% - 99%)    PHYSICAL EXAM:    General: awake alert NAD  HEENT: [x ] normal  [ ] dry mouth  [ ] ET tube/trach    Lungs: [x ] comfortable [ ] tachypnea/labored breathing  [ ] excessive secretions    CV: [ x] normal  + CT to drainage    GI: [ x] normal  [ ] distended  [ ] tender  [ ] no BS               [ ] PEG/NG/OG tube    : [x ] normal  [ ] incontinent  [ ] oliguria/anuria  [ ] hdez    MSK: [ ] normal  [ x] weakness  [ ] edema             [ ] ambulatory  [ x] bedbound/wheelchair bound    Skin: [ ] normal  [ ] pressure ulcers- Stage_____  [x ] no rash    LABS:    I&O's Summary    18 Oct 2020 07:01  -  19 Oct 2020 07:00  --------------------------------------------------------  IN: 220 mL / OUT: 30 mL / NET: 190 mL        RADIOLOGY & ADDITIONAL STUDIES:      < from: Xray Chest 1 View- PORTABLE-Routine (Xray Chest 1 View- PORTABLE-Routine in AM.) (10.18.20 @ 11:12) >     EXAM:  XR CHEST PORTABLE ROUTINE 1V                          PROCEDURE DATE:  10/18/2020          INTERPRETATION:  HISTORY: Right pigtail catheter    TECHNIQUE: Single frontal view of the chest with comparison to 10/17/2020    FINDINGS:    Right pigtail catheter in place. Heart is normal in size. No focal consolidation. Hemidiaphragms unremarkable. Degenerative changes.        IMPRESSION:    Right pigtail catheter in place. No focal consolidation.    < end of copied text >

## 2020-10-19 NOTE — PROGRESS NOTE ADULT - ASSESSMENT
CKD: stage III (baseline Screat 1.8)   h/o HTN, CHF, cirrhosis with ascites, HLD, AFib  DM ==> 24 hr urine= 2100 mg proteinuria  Large R pleural effusion s/p CT   Elevated BUN also due to steroid effect  No obstruction on renal sono  - avoid potential nephrotoxins  - cont Lasix and Aldactone==> will need to be kept azotemic

## 2020-10-19 NOTE — PROGRESS NOTE ADULT - SUBJECTIVE AND OBJECTIVE BOX
Subjective: Patient doing well. Saturating well on RA. No complaints.     Vital Signs:  Vital Signs Last 24 Hrs  T(C): 36.8 (10-19-20 @ 08:35), Max: 36.8 (10-19-20 @ 08:35)  T(F): 98.2 (10-19-20 @ 08:35), Max: 98.2 (10-19-20 @ 08:35)  HR: 61 (10-19-20 @ 08:35) (50 - 66)  BP: 100/78 (10-19-20 @ 08:35) (100/78 - 156/79)  RR: 18 (10-19-20 @ 08:35) (18 - 19)  SpO2: 99% (10-19-20 @ 08:35) (98% - 99%) on (O2)    I&O's Detail    18 Oct 2020 07:01  -  19 Oct 2020 07:00  --------------------------------------------------------  IN:    Oral Fluid: 220 mL  Total IN: 220 mL    OUT:    Chest Tube (mL): 30 mL  Total OUT: 30 mL    Total NET: 190 mL        General: NAD  Neurology: Awake, nonfocal, BLANCAS x 4  Eyes: Scleras clear, EOMI, Gross vision intact  ENT: Gross hearing intact, grossly patent pharynx, no stridor  Neck: Neck supple, trachea midline, No JVD  Respiratory: CTA B/L, No wheezing, rales, rhonchi  CV: S1S2, no murmurs, rubs or gallops  Abdominal: Soft, NT, ND  Psych: Oriented x 3  Tubes: R PTC, to water seal, no air leak, 30cc out in 24H    Relevant labs, radiology and Medications reviewed            MEDICATIONS  (STANDING):  ATENolol  Tablet 25 milliGRAM(s) Oral daily  dextrose 5%. 1000 milliLiter(s) (50 mL/Hr) IV Continuous <Continuous>  dextrose 50% Injectable 12.5 Gram(s) IV Push once  dextrose 50% Injectable 25 Gram(s) IV Push once  dextrose 50% Injectable 25 Gram(s) IV Push once  furosemide    Tablet 40 milliGRAM(s) Oral daily  heparin   Injectable 5000 Unit(s) SubCutaneous every 12 hours  hydrALAZINE 25 milliGRAM(s) Oral three times a day  influenza   Vaccine 0.5 milliLiter(s) IntraMuscular once  insulin glargine Injectable (LANTUS) 16 Unit(s) SubCutaneous at bedtime  insulin lispro (HumaLOG) corrective regimen sliding scale   SubCutaneous three times a day before meals  lactulose Syrup 15 Gram(s) Oral two times a day  lidocaine   Patch 1 Patch Transdermal daily  predniSONE   Tablet 2.5 milliGRAM(s) Oral daily  spironolactone 25 milliGRAM(s) Oral daily    MEDICATIONS  (PRN):  acetaminophen   Tablet .. 650 milliGRAM(s) Oral every 6 hours PRN Temp greater or equal to 38C (100.4F), Mild Pain (1 - 3)  dextrose 40% Gel 15 Gram(s) Oral once PRN Blood Glucose LESS THAN 70 milliGRAM(s)/deciliter  glucagon  Injectable 1 milliGRAM(s) IntraMuscular once PRN Glucose LESS THAN 70 milligrams/deciliter  hydrALAZINE Injectable 5 milliGRAM(s) IV Push every 6 hours PRN for sbp above 160 mmhg  oxycodone    5 mG/acetaminophen 325 mG 1 Tablet(s) Oral every 4 hours PRN Moderate Pain (4 - 6)        Assessment  85y Female  w/ PAST MEDICAL & SURGICAL HISTORY:  Ascites    Cirrhosis    DM (diabetes mellitus)    CHF (congestive heart failure)    HTN (hypertension)    S/P hernia repair    admitted with complaints of Patient is a 85y old  Female who presents with a chief complaint of right Pleural effusion (19 Oct 2020 09:09)

## 2020-10-19 NOTE — PROGRESS NOTE ADULT - ASSESSMENT
86yo female with PMH of CHF, cirrhosis, HTN, DM, and A fib (no AC) found to have right pleural effusion. S/P R PTC placed 10/9. Transudate. Cytology negative. Cultures NGTD. CXR with right trapped lung. Chest tube with high output.     maintain chest tube to water seal  daily CXR  record output per shift  pain control with tylenol, patient not tolerating lidoderm patches  Remove chest tube upon confirmation of discharge, likely tomorrow    Shelia PULIDO  Thoracic Sx

## 2020-10-20 ENCOUNTER — TRANSCRIPTION ENCOUNTER (OUTPATIENT)
Age: 85
End: 2020-10-20

## 2020-10-20 VITALS
DIASTOLIC BLOOD PRESSURE: 72 MMHG | RESPIRATION RATE: 19 BRPM | TEMPERATURE: 98 F | HEART RATE: 62 BPM | SYSTOLIC BLOOD PRESSURE: 122 MMHG | OXYGEN SATURATION: 95 %

## 2020-10-20 LAB
ANION GAP SERPL CALC-SCNC: 14 MMOL/L — SIGNIFICANT CHANGE UP (ref 5–17)
BUN SERPL-MCNC: 96 MG/DL — HIGH (ref 8–20)
CALCIUM SERPL-MCNC: 9.5 MG/DL — SIGNIFICANT CHANGE UP (ref 8.6–10.2)
CHLORIDE SERPL-SCNC: 94 MMOL/L — LOW (ref 98–107)
CO2 SERPL-SCNC: 23 MMOL/L — SIGNIFICANT CHANGE UP (ref 22–29)
CREAT SERPL-MCNC: 2.24 MG/DL — HIGH (ref 0.5–1.3)
GLUCOSE BLDC GLUCOMTR-MCNC: 196 MG/DL — HIGH (ref 70–99)
GLUCOSE BLDC GLUCOMTR-MCNC: 196 MG/DL — HIGH (ref 70–99)
GLUCOSE BLDC GLUCOMTR-MCNC: 228 MG/DL — HIGH (ref 70–99)
GLUCOSE BLDC GLUCOMTR-MCNC: 258 MG/DL — HIGH (ref 70–99)
GLUCOSE SERPL-MCNC: 208 MG/DL — HIGH (ref 70–99)
HCT VFR BLD CALC: 36.7 % — SIGNIFICANT CHANGE UP (ref 34.5–45)
HGB BLD-MCNC: 12.2 G/DL — SIGNIFICANT CHANGE UP (ref 11.5–15.5)
MAGNESIUM SERPL-MCNC: 2.3 MG/DL — SIGNIFICANT CHANGE UP (ref 1.6–2.6)
MCHC RBC-ENTMCNC: 31.1 PG — SIGNIFICANT CHANGE UP (ref 27–34)
MCHC RBC-ENTMCNC: 33.2 GM/DL — SIGNIFICANT CHANGE UP (ref 32–36)
MCV RBC AUTO: 93.6 FL — SIGNIFICANT CHANGE UP (ref 80–100)
PHOSPHATE SERPL-MCNC: 3.3 MG/DL — SIGNIFICANT CHANGE UP (ref 2.4–4.7)
PLATELET # BLD AUTO: 158 K/UL — SIGNIFICANT CHANGE UP (ref 150–400)
POTASSIUM SERPL-MCNC: 4.6 MMOL/L — SIGNIFICANT CHANGE UP (ref 3.5–5.3)
POTASSIUM SERPL-SCNC: 4.6 MMOL/L — SIGNIFICANT CHANGE UP (ref 3.5–5.3)
RBC # BLD: 3.92 M/UL — SIGNIFICANT CHANGE UP (ref 3.8–5.2)
RBC # FLD: 14.1 % — SIGNIFICANT CHANGE UP (ref 10.3–14.5)
SODIUM SERPL-SCNC: 131 MMOL/L — LOW (ref 135–145)
WBC # BLD: 8.48 K/UL — SIGNIFICANT CHANGE UP (ref 3.8–10.5)
WBC # FLD AUTO: 8.48 K/UL — SIGNIFICANT CHANGE UP (ref 3.8–10.5)

## 2020-10-20 PROCEDURE — 82947 ASSAY GLUCOSE BLOOD QUANT: CPT

## 2020-10-20 PROCEDURE — 84100 ASSAY OF PHOSPHORUS: CPT

## 2020-10-20 PROCEDURE — 71250 CT THORAX DX C-: CPT

## 2020-10-20 PROCEDURE — 86769 SARS-COV-2 COVID-19 ANTIBODY: CPT

## 2020-10-20 PROCEDURE — 82945 GLUCOSE OTHER FLUID: CPT

## 2020-10-20 PROCEDURE — 87102 FUNGUS ISOLATION CULTURE: CPT

## 2020-10-20 PROCEDURE — 87075 CULTR BACTERIA EXCEPT BLOOD: CPT

## 2020-10-20 PROCEDURE — 83880 ASSAY OF NATRIURETIC PEPTIDE: CPT

## 2020-10-20 PROCEDURE — 84443 ASSAY THYROID STIM HORMONE: CPT

## 2020-10-20 PROCEDURE — 82042 OTHER SOURCE ALBUMIN QUAN EA: CPT

## 2020-10-20 PROCEDURE — 99285 EMERGENCY DEPT VISIT HI MDM: CPT | Mod: 25

## 2020-10-20 PROCEDURE — 97530 THERAPEUTIC ACTIVITIES: CPT

## 2020-10-20 PROCEDURE — 85730 THROMBOPLASTIN TIME PARTIAL: CPT

## 2020-10-20 PROCEDURE — 85018 HEMOGLOBIN: CPT

## 2020-10-20 PROCEDURE — 83986 ASSAY PH BODY FLUID NOS: CPT

## 2020-10-20 PROCEDURE — 84157 ASSAY OF PROTEIN OTHER: CPT

## 2020-10-20 PROCEDURE — 85610 PROTHROMBIN TIME: CPT

## 2020-10-20 PROCEDURE — 85014 HEMATOCRIT: CPT

## 2020-10-20 PROCEDURE — 84145 PROCALCITONIN (PCT): CPT

## 2020-10-20 PROCEDURE — 97116 GAIT TRAINING THERAPY: CPT

## 2020-10-20 PROCEDURE — 84484 ASSAY OF TROPONIN QUANT: CPT

## 2020-10-20 PROCEDURE — 71045 X-RAY EXAM CHEST 1 VIEW: CPT | Mod: 26,77

## 2020-10-20 PROCEDURE — 89051 BODY FLUID CELL COUNT: CPT

## 2020-10-20 PROCEDURE — 83690 ASSAY OF LIPASE: CPT

## 2020-10-20 PROCEDURE — 85027 COMPLETE CBC AUTOMATED: CPT

## 2020-10-20 PROCEDURE — 76775 US EXAM ABDO BACK WALL LIM: CPT

## 2020-10-20 PROCEDURE — U0003: CPT

## 2020-10-20 PROCEDURE — 84156 ASSAY OF PROTEIN URINE: CPT

## 2020-10-20 PROCEDURE — 80053 COMPREHEN METABOLIC PANEL: CPT

## 2020-10-20 PROCEDURE — 81001 URINALYSIS AUTO W/SCOPE: CPT

## 2020-10-20 PROCEDURE — 82330 ASSAY OF CALCIUM: CPT

## 2020-10-20 PROCEDURE — P9047: CPT

## 2020-10-20 PROCEDURE — 84295 ASSAY OF SERUM SODIUM: CPT

## 2020-10-20 PROCEDURE — 80048 BASIC METABOLIC PNL TOTAL CA: CPT

## 2020-10-20 PROCEDURE — 71045 X-RAY EXAM CHEST 1 VIEW: CPT | Mod: 26

## 2020-10-20 PROCEDURE — 84300 ASSAY OF URINE SODIUM: CPT

## 2020-10-20 PROCEDURE — 99239 HOSP IP/OBS DSCHRG MGMT >30: CPT

## 2020-10-20 PROCEDURE — 87205 SMEAR GRAM STAIN: CPT

## 2020-10-20 PROCEDURE — 82803 BLOOD GASES ANY COMBINATION: CPT

## 2020-10-20 PROCEDURE — 82962 GLUCOSE BLOOD TEST: CPT

## 2020-10-20 PROCEDURE — 83735 ASSAY OF MAGNESIUM: CPT

## 2020-10-20 PROCEDURE — 84132 ASSAY OF SERUM POTASSIUM: CPT

## 2020-10-20 PROCEDURE — 82140 ASSAY OF AMMONIA: CPT

## 2020-10-20 PROCEDURE — 85025 COMPLETE CBC W/AUTO DIFF WBC: CPT

## 2020-10-20 PROCEDURE — 83615 LACTATE (LD) (LDH) ENZYME: CPT

## 2020-10-20 PROCEDURE — 83605 ASSAY OF LACTIC ACID: CPT

## 2020-10-20 PROCEDURE — 96374 THER/PROPH/DIAG INJ IV PUSH: CPT

## 2020-10-20 PROCEDURE — 99232 SBSQ HOSP IP/OBS MODERATE 35: CPT

## 2020-10-20 PROCEDURE — C1729: CPT

## 2020-10-20 PROCEDURE — 71045 X-RAY EXAM CHEST 1 VIEW: CPT

## 2020-10-20 PROCEDURE — 82435 ASSAY OF BLOOD CHLORIDE: CPT

## 2020-10-20 PROCEDURE — 87070 CULTURE OTHR SPECIMN AEROBIC: CPT

## 2020-10-20 PROCEDURE — 83036 HEMOGLOBIN GLYCOSYLATED A1C: CPT

## 2020-10-20 PROCEDURE — 93005 ELECTROCARDIOGRAM TRACING: CPT

## 2020-10-20 PROCEDURE — 36415 COLL VENOUS BLD VENIPUNCTURE: CPT

## 2020-10-20 RX ADMIN — Medication 3: at 11:50

## 2020-10-20 RX ADMIN — Medication 1: at 08:05

## 2020-10-20 RX ADMIN — ATENOLOL 25 MILLIGRAM(S): 25 TABLET ORAL at 05:59

## 2020-10-20 RX ADMIN — Medication 40 MILLIGRAM(S): at 05:58

## 2020-10-20 RX ADMIN — Medication 2.5 MILLIGRAM(S): at 05:58

## 2020-10-20 RX ADMIN — LACTULOSE 15 GRAM(S): 10 SOLUTION ORAL at 06:06

## 2020-10-20 RX ADMIN — HEPARIN SODIUM 5000 UNIT(S): 5000 INJECTION INTRAVENOUS; SUBCUTANEOUS at 10:08

## 2020-10-20 RX ADMIN — SPIRONOLACTONE 25 MILLIGRAM(S): 25 TABLET, FILM COATED ORAL at 05:59

## 2020-10-20 RX ADMIN — Medication 25 MILLIGRAM(S): at 05:58

## 2020-10-20 RX ADMIN — LACTULOSE 15 GRAM(S): 10 SOLUTION ORAL at 16:49

## 2020-10-20 RX ADMIN — Medication 25 MILLIGRAM(S): at 13:30

## 2020-10-20 RX ADMIN — Medication 1: at 16:50

## 2020-10-20 NOTE — PROGRESS NOTE ADULT - ASSESSMENT
CKD: stage III (baseline Screat 1.8)   h/o HTN, CHF, cirrhosis with ascites, HLD, AFib  DM ==> 24 hr urine= 2100 mg proteinuria  Large R pleural effusion s/p CT   Elevated BUN also due to steroids  Hyponatremia  No obstruction on renal sono  - avoid potential nephrotoxins  - cont Lasix and Aldactone==> maintain azotemia  - oral fluid restriction

## 2020-10-20 NOTE — PROGRESS NOTE ADULT - PROBLEM SELECTOR PROBLEM 1
Cirrhosis of liver with ascites, unspecified hepatic cirrhosis type
Cirrhosis of liver with ascites, unspecified hepatic cirrhosis type
Pleural effusion

## 2020-10-20 NOTE — DISCHARGE NOTE NURSING/CASE MANAGEMENT/SOCIAL WORK - PATIENT PORTAL LINK FT
You can access the FollowMyHealth Patient Portal offered by [facility] by registering at the following website: [email or web address]. By joining [facility]’s FollowMyHealth portal, you will also be able to view your health information using other applications (apps) compatible with our system.

## 2020-10-20 NOTE — PROGRESS NOTE ADULT - SUBJECTIVE AND OBJECTIVE BOX
Subjective: Pt sitting up in bed sleeping.  NAD noted.    Vital Signs:  Vital Signs Last 24 Hrs  T(C): 36.7 (10-20-20 @ 08:03), Max: 36.8 (10-20-20 @ 05:52)  T(F): 98.1 (10-20-20 @ 08:03), Max: 98.2 (10-20-20 @ 05:52)  HR: 71 (10-20-20 @ 08:03) (67 - 73)  BP: 144/68 (10-20-20 @ 08:03) (136/68 - 152/82)  RR: 18 (10-20-20 @ 08:03) (18 - 18)  SpO2: 97% (10-20-20 @ 05:52) (97% - 98%)    Neurology: A&Ox3, nonfocal.  Respiratory: Diminished  CV: RRR, S1S2.  Abdominal: Soft, NT, ND +BS.  CT site without crepitus and no airleak noted.        10-19 @ 07:01  -  10-20 @ 07:00  --------------------------------------------------------  IN:  Total IN: 0 mL    OUT:    Chest Tube (mL): 220 mL    Voided (mL): 500 mL  Total OUT: 720 mL    Total NET: -720 mL

## 2020-10-20 NOTE — PROGRESS NOTE ADULT - REASON FOR ADMISSION
right Pleural effusion
Rt Pleural effusion
right Pleural effusion

## 2020-10-20 NOTE — PROGRESS NOTE ADULT - SUBJECTIVE AND OBJECTIVE BOX
CC: follow up symptoms  INTERVAL HPI/OVERNIGHT EVENTS:    PRESENT SYMPTOMS: SOURCE:  Patient/Family/Team    PAIN SCALE:  0 = none  1 = mild   2 = moderate  3 = severe    Pain: 1-2    Dyspnea:  [ ] YES [ x] NO  Anxiety:  [ ] YES [ x] NO  Fatigue: [x ] YES [ ] NO  Nausea: [ ] YES [ ]x NO  Loss of Appetite: [x ] YES [ ] NO  Other symptoms: __________    MEDICATIONS  (STANDING):  ATENolol  Tablet 25 milliGRAM(s) Oral daily  dextrose 5%. 1000 milliLiter(s) (50 mL/Hr) IV Continuous <Continuous>  dextrose 50% Injectable 12.5 Gram(s) IV Push once  dextrose 50% Injectable 25 Gram(s) IV Push once  dextrose 50% Injectable 25 Gram(s) IV Push once  furosemide    Tablet 40 milliGRAM(s) Oral daily  heparin   Injectable 5000 Unit(s) SubCutaneous every 12 hours  hydrALAZINE 25 milliGRAM(s) Oral three times a day  influenza   Vaccine 0.5 milliLiter(s) IntraMuscular once  insulin glargine Injectable (LANTUS) 16 Unit(s) SubCutaneous at bedtime  insulin lispro (HumaLOG) corrective regimen sliding scale   SubCutaneous three times a day before meals  lactulose Syrup 15 Gram(s) Oral two times a day  lidocaine   Patch 1 Patch Transdermal daily  predniSONE   Tablet 2.5 milliGRAM(s) Oral daily  spironolactone 25 milliGRAM(s) Oral daily    MEDICATIONS  (PRN):  acetaminophen   Tablet .. 650 milliGRAM(s) Oral every 6 hours PRN Temp greater or equal to 38C (100.4F), Mild Pain (1 - 3)  dextrose 40% Gel 15 Gram(s) Oral once PRN Blood Glucose LESS THAN 70 milliGRAM(s)/deciliter  glucagon  Injectable 1 milliGRAM(s) IntraMuscular once PRN Glucose LESS THAN 70 milligrams/deciliter  hydrALAZINE Injectable 5 milliGRAM(s) IV Push every 6 hours PRN for sbp above 160 mmhg  oxycodone    5 mG/acetaminophen 325 mG 1 Tablet(s) Oral every 4 hours PRN Moderate Pain (4 - 6)      Allergies    penicillin (Other)    Intolerances    Karnofsky Performance Score/Palliative Performance Status Version 2:         %    Vital Signs Last 24 Hrs  T(C): 36.7 (20 Oct 2020 08:03), Max: 36.8 (20 Oct 2020 05:52)  T(F): 98.1 (20 Oct 2020 08:03), Max: 98.2 (20 Oct 2020 05:52)  HR: 71 (20 Oct 2020 08:03) (67 - 73)  BP: 144/68 (20 Oct 2020 08:03) (144/68 - 152/82)  BP(mean): --  RR: 18 (20 Oct 2020 08:03) (18 - 18)  SpO2: 97% (20 Oct 2020 05:52) (97% - 98%)    PHYSICAL EXAM:    General: awake alert NAD    HEENT: [ x] normal  [ ] dry mouth  [ ] ET tube/trach    Lungs: [ x] comfortable [ ] tachypnea/labored breathing  [ ] excessive secretions    CV: [ x] normal  [ ] tachycardia    GI: soft NT ND  : [ x] normal  [ ] incontinent  [ ] oliguria/anuria  [ ] hdez    MSK: [ ] normal  [x ] weakness  [ ] edema             [ ] ambulatory  [ x] bedbound/wheelchair bound    Skin: [ ] normal  [ ] pressure ulcers- Stage_____  [ x] no rash    LABS:                        12.2   8.48  )-----------( 158      ( 20 Oct 2020 08:07 )             36.7     10-20    131<L>  |  94<L>  |  96.0<H>  ----------------------------<  208<H>  4.6   |  23.0  |  2.24<H>    Ca    9.5      20 Oct 2020 08:07  Phos  3.3     10-20  Mg     2.3     10-20      I&O's Summary    19 Oct 2020 07:01  -  20 Oct 2020 07:00  --------------------------------------------------------  IN: 0 mL / OUT: 720 mL / NET: -720 mL        RADIOLOGY & ADDITIONAL STUDIES:

## 2020-10-20 NOTE — PROGRESS NOTE ADULT - NUTRITIONAL ASSESSMENT
This patient has been assessed with a concern for Malnutrition and has been determined to have a diagnosis/diagnoses of Severe protein-calorie malnutrition.    This patient is being managed with:   Diet Consistent Carbohydrate/No Snacks-  DASH/TLC {Sodium & Cholesterol Restricted} (DASH)  1000mL Fluid Restriction ([id number])  No Concentrated Potassium  Entered: Oct 15 2020  9:51AM    
This patient has been assessed with a concern for Malnutrition and has been determined to have a diagnosis/diagnoses of Severe protein-calorie malnutrition.    This patient is being managed with:   Diet Consistent Carbohydrate/No Snacks-  DASH/TLC {Sodium & Cholesterol Restricted} (DASH)  1000mL Fluid Restriction ([id number])  Entered: Oct  9 2020 10:15AM    
This patient has been assessed with a concern for Malnutrition and has been determined to have a diagnosis/diagnoses of Severe protein-calorie malnutrition.    This patient is being managed with:   Diet Consistent Carbohydrate/No Snacks-  DASH/TLC {Sodium & Cholesterol Restricted} (DASH)  1000mL Fluid Restriction ([id number])  No Concentrated Potassium  Entered: Oct 15 2020  9:51AM    
This patient has been assessed with a concern for Malnutrition and has been determined to have a diagnosis/diagnoses of Severe protein-calorie malnutrition.    This patient is being managed with:   Diet Consistent Carbohydrate/No Snacks-  DASH/TLC {Sodium & Cholesterol Restricted} (DASH)  1000mL Fluid Restriction ([id number])  No Concentrated Potassium  Entered: Oct 15 2020  9:51AM    
This patient has been assessed with a concern for Malnutrition and has been determined to have a diagnosis/diagnoses of Severe protein-calorie malnutrition.    This patient is being managed with:   Diet Consistent Carbohydrate/No Snacks-  DASH/TLC {Sodium & Cholesterol Restricted} (DASH)  1000mL Fluid Restriction ([id number])  No Concentrated Potassium  Entered: Oct 15 2020  9:51AM    
This patient has been assessed with a concern for Malnutrition and has been determined to have a diagnosis/diagnoses of Severe protein-calorie malnutrition.    This patient is being managed with:   Diet Consistent Carbohydrate/No Snacks-  DASH/TLC {Sodium & Cholesterol Restricted} (DASH)  1000mL Fluid Restriction ([id number])  Entered: Oct  9 2020 10:15AM    
This patient has been assessed with a concern for Malnutrition and has been determined to have a diagnosis/diagnoses of Severe protein-calorie malnutrition.    This patient is being managed with:   Diet Consistent Carbohydrate/No Snacks-  DASH/TLC {Sodium & Cholesterol Restricted} (DASH)  1000mL Fluid Restriction ([id number])  No Concentrated Potassium  Entered: Oct 15 2020  9:51AM    
This patient has been assessed with a concern for Malnutrition and has been determined to have a diagnosis/diagnoses of Severe protein-calorie malnutrition.    This patient is being managed with:   Diet Consistent Carbohydrate/No Snacks-  DASH/TLC {Sodium & Cholesterol Restricted} (DASH)  1000mL Fluid Restriction ([id number])  Entered: Oct  9 2020 10:15AM    
This patient has been assessed with a concern for Malnutrition and has been determined to have a diagnosis/diagnoses of Severe protein-calorie malnutrition.    This patient is being managed with:   Diet Consistent Carbohydrate/No Snacks-  DASH/TLC {Sodium & Cholesterol Restricted} (DASH)  1000mL Fluid Restriction ([id number])  Entered: Oct  9 2020 10:15AM    
This patient has been assessed with a concern for Malnutrition and has been determined to have a diagnosis/diagnoses of Severe protein-calorie malnutrition.    This patient is being managed with:   Diet Consistent Carbohydrate/No Snacks-  DASH/TLC {Sodium & Cholesterol Restricted} (DASH)  1000mL Fluid Restriction ([id number])  No Concentrated Potassium  Entered: Oct 15 2020  9:51AM    
This patient has been assessed with a concern for Malnutrition and has been determined to have a diagnosis/diagnoses of Severe protein-calorie malnutrition.    This patient is being managed with:   Diet Consistent Carbohydrate/No Snacks-  DASH/TLC {Sodium & Cholesterol Restricted} (DASH)  1000mL Fluid Restriction ([id number])  No Concentrated Potassium  Entered: Oct 15 2020  9:51AM    
This patient has been assessed with a concern for Malnutrition and has been determined to have a diagnosis/diagnoses of Severe protein-calorie malnutrition.    This patient is being managed with:   Diet Consistent Carbohydrate/No Snacks-  DASH/TLC {Sodium & Cholesterol Restricted} (DASH)  1000mL Fluid Restriction ([id number])  No Concentrated Potassium  Entered: Oct 15 2020  9:51AM    
This patient has been assessed with a concern for Malnutrition and has been determined to have a diagnosis/diagnoses of Severe protein-calorie malnutrition.    This patient is being managed with:   Diet Consistent Carbohydrate/No Snacks-  DASH/TLC {Sodium & Cholesterol Restricted} (DASH)  1000mL Fluid Restriction ([id number])  No Concentrated Potassium  Entered: Oct 15 2020  9:51AM    
This patient has been assessed with a concern for Malnutrition and has been determined to have a diagnosis/diagnoses of Severe protein-calorie malnutrition.    This patient is being managed with:   Diet Consistent Carbohydrate/No Snacks-  DASH/TLC {Sodium & Cholesterol Restricted} (DASH)  1000mL Fluid Restriction ([id number])  No Concentrated Potassium  Entered: Oct 15 2020  9:51AM    
This patient has been assessed with a concern for Malnutrition and has been determined to have a diagnosis/diagnoses of Severe protein-calorie malnutrition.    This patient is being managed with:   Diet Consistent Carbohydrate/No Snacks-  DASH/TLC {Sodium & Cholesterol Restricted} (DASH)  1000mL Fluid Restriction ([id number])  Entered: Oct  9 2020 10:15AM    
This patient has been assessed with a concern for Malnutrition and has been determined to have a diagnosis/diagnoses of Severe protein-calorie malnutrition.    This patient is being managed with:   Diet Consistent Carbohydrate/No Snacks-  DASH/TLC {Sodium & Cholesterol Restricted} (DASH)  1000mL Fluid Restriction ([id number])  No Concentrated Potassium  Entered: Oct 15 2020  9:51AM    
This patient has been assessed with a concern for Malnutrition and has been determined to have a diagnosis/diagnoses of Severe protein-calorie malnutrition.    This patient is being managed with:   Diet Consistent Carbohydrate/No Snacks-  DASH/TLC {Sodium & Cholesterol Restricted} (DASH)  1000mL Fluid Restriction ([id number])  Entered: Oct  9 2020 10:15AM    
This patient has been assessed with a concern for Malnutrition and has been determined to have a diagnosis/diagnoses of Severe protein-calorie malnutrition.    This patient is being managed with:   Diet Consistent Carbohydrate/No Snacks-  DASH/TLC {Sodium & Cholesterol Restricted} (DASH)  1000mL Fluid Restriction ([id number])  No Concentrated Potassium  Entered: Oct 15 2020  9:51AM

## 2020-10-20 NOTE — PROGRESS NOTE ADULT - SUBJECTIVE AND OBJECTIVE BOX
NEPHROLOGY INTERVAL HPI/OVERNIGHT EVENTS:  pt essentially the same  no acute distress noted    MEDICATIONS  (STANDING):  ATENolol  Tablet 25 milliGRAM(s) Oral daily  dextrose 5%. 1000 milliLiter(s) (50 mL/Hr) IV Continuous <Continuous>  dextrose 50% Injectable 12.5 Gram(s) IV Push once  dextrose 50% Injectable 25 Gram(s) IV Push once  dextrose 50% Injectable 25 Gram(s) IV Push once  furosemide    Tablet 40 milliGRAM(s) Oral daily  heparin   Injectable 5000 Unit(s) SubCutaneous every 12 hours  hydrALAZINE 25 milliGRAM(s) Oral three times a day  influenza   Vaccine 0.5 milliLiter(s) IntraMuscular once  insulin glargine Injectable (LANTUS) 16 Unit(s) SubCutaneous at bedtime  insulin lispro (HumaLOG) corrective regimen sliding scale   SubCutaneous three times a day before meals  lactulose Syrup 15 Gram(s) Oral two times a day  lidocaine   Patch 1 Patch Transdermal daily  predniSONE   Tablet 2.5 milliGRAM(s) Oral daily  spironolactone 25 milliGRAM(s) Oral daily    MEDICATIONS  (PRN):  acetaminophen   Tablet .. 650 milliGRAM(s) Oral every 6 hours PRN Temp greater or equal to 38C (100.4F), Mild Pain (1 - 3)  dextrose 40% Gel 15 Gram(s) Oral once PRN Blood Glucose LESS THAN 70 milliGRAM(s)/deciliter  glucagon  Injectable 1 milliGRAM(s) IntraMuscular once PRN Glucose LESS THAN 70 milligrams/deciliter  hydrALAZINE Injectable 5 milliGRAM(s) IV Push every 6 hours PRN for sbp above 160 mmhg  oxycodone    5 mG/acetaminophen 325 mG 1 Tablet(s) Oral every 4 hours PRN Moderate Pain (4 - 6)      Allergies    penicillin (Other)        Vital Signs Last 24 Hrs  T(C): 36.7 (20 Oct 2020 08:03), Max: 36.8 (20 Oct 2020 05:52)  T(F): 98.1 (20 Oct 2020 08:03), Max: 98.2 (20 Oct 2020 05:52)  HR: 71 (20 Oct 2020 08:03) (67 - 73)  BP: 144/68 (20 Oct 2020 08:03) (136/68 - 152/82)  BP(mean): --  RR: 18 (20 Oct 2020 08:03) (18 - 18)  SpO2: 97% (20 Oct 2020 05:52) (97% - 98%)    PHYSICAL EXAM:  GENERAL: Frail, fatigued  NECK: Supple, no JVD  NERVOUS SYSTEM:  Alert & Oriented X3; no asterixis  CHEST/LUNG: Diminished BS at bases; R CT  HEART: Regular rate and rhythm; No rub  ABDOMEN: Soft, Nontender, Nondistended; BS+  EXTREMITIES: + LE edema improved    LABS:                        12.2   8.48  )-----------( 158      ( 20 Oct 2020 08:07 )             36.7     10-20    131<L>  |  94<L>  |  96.0<H>  ----------------------------<  208<H>  4.6   |  23.0  |  2.24<H>        Ca    9.5      20 Oct 2020 08:07  Phos  3.3     10-20  Mg     2.3     10-20          Phosphorus Level, Serum: 3.3 mg/dL (10-20 @ 08:07)  Magnesium, Serum: 2.3 mg/dL (10-20 @ 08:07)      RADIOLOGY & ADDITIONAL TESTS:  < from: Xray Chest 1 View- PORTABLE-Routine (Xray Chest 1 View- PORTABLE-Routine in AM.) (10.19.20 @ 06:11) >     EXAM:  XR CHEST PORTABLE ROUTINE 1V                          PROCEDURE DATE:  10/19/2020          INTERPRETATION:  Portable chest radiograph    CLINICAL INFORMATION:   Short of breath.    TECHNIQUE:  Portable  AP view of the chest was obtained.    COMPARISON: 10/18/2020 chest available for review.    FINDINGS:    RIGHT multi-sidehole pigtail catheter overlies RIGHT lower hemithorax.    The lungs are clear of airspace consolidations or effusions. No pneumothorax.    The heart and mediastinum are within normal limits.    Visualized osseous structures are intact.        IMPRESSION:   No evidence of active chest disease.    < end of copied text >

## 2020-10-20 NOTE — PROGRESS NOTE ADULT - ASSESSMENT
85 yr woman hx of cirrhosis, CHF, afib admitted after a mechanical fall  with sx of SOB found to have large right pleural effusion associated with HF, cirrhosis  and DEVYN likely on CKD     Problem/Recommendation - 1:  Problem: Pleural effusion. 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   cytology negative  CTS following - likely not a candidate for Pleurex  Oxycodone renewed for pain       Problem/Recommendation - 2:  ·  Problem: Cirrhosis.  Recommendation: with Ascites  no planned paracentesis       Problem/Recommendation - 3:  ·  Problem: DEVYN (acute kidney injury).  Recommendation: Renal following.  cont medical optimization  patient states would NOT want dialysis.      Problem/Recommendation - 4:  ·  Problem: CHF (congestive heart failure).  Recommendation:   cont Spironolactone and Lasix.      Problem/Recommendation - 5:  ·  Problem: Encounter for palliative care.  Recommendation:  CT to be d/c, Not a candidate for Pleurex. Discussed with patient consideration for symptom relief with opioid. She declined  Plan for JACOB today. Patient was informed hospice services can be available after JACOB.  Hospice and daughter aware.

## 2020-11-07 LAB
CULTURE RESULTS: SIGNIFICANT CHANGE UP
SPECIMEN SOURCE: SIGNIFICANT CHANGE UP

## 2020-11-18 ENCOUNTER — APPOINTMENT (OUTPATIENT)
Dept: PULMONOLOGY | Facility: CLINIC | Age: 85
End: 2020-11-18

## 2021-06-15 ENCOUNTER — NON-APPOINTMENT (OUTPATIENT)
Age: 86
End: 2021-06-15

## 2021-08-23 RX ORDER — SPIRONOLACTONE 25 MG/1
25 TABLET ORAL DAILY
Qty: 30 | Refills: 0 | Status: ACTIVE | COMMUNITY
Start: 2020-08-10 | End: 1900-01-01

## 2021-09-07 ENCOUNTER — RX CHANGE (OUTPATIENT)
Age: 86
End: 2021-09-07

## 2023-03-09 NOTE — CONSULT NOTE ADULT - PROBLEM SELECTOR PROBLEM 5
DM (diabetes mellitus) Imiquimod Pregnancy And Lactation Text: This medication is Pregnancy Category C. It is unknown if this medication is excreted in breast milk.